# Patient Record
Sex: MALE | Race: BLACK OR AFRICAN AMERICAN | NOT HISPANIC OR LATINO | Employment: OTHER | URBAN - METROPOLITAN AREA
[De-identification: names, ages, dates, MRNs, and addresses within clinical notes are randomized per-mention and may not be internally consistent; named-entity substitution may affect disease eponyms.]

---

## 2018-03-02 ENCOUNTER — OFFICE VISIT (OUTPATIENT)
Dept: INTERNAL MEDICINE | Facility: CLINIC | Age: 63
End: 2018-03-02
Payer: MEDICARE

## 2018-03-02 VITALS
TEMPERATURE: 98 F | BODY MASS INDEX: 27.08 KG/M2 | HEIGHT: 72 IN | OXYGEN SATURATION: 99 % | HEART RATE: 106 BPM | SYSTOLIC BLOOD PRESSURE: 148 MMHG | WEIGHT: 199.94 LBS | DIASTOLIC BLOOD PRESSURE: 88 MMHG

## 2018-03-02 DIAGNOSIS — Z12.5 PROSTATE CANCER SCREENING: ICD-10-CM

## 2018-03-02 DIAGNOSIS — M25.562 CHRONIC PAIN OF LEFT KNEE: Primary | ICD-10-CM

## 2018-03-02 DIAGNOSIS — E11.8 DM (DIABETES MELLITUS) WITH COMPLICATIONS: ICD-10-CM

## 2018-03-02 DIAGNOSIS — G89.29 CHRONIC PAIN OF LEFT KNEE: Primary | ICD-10-CM

## 2018-03-02 PROCEDURE — 99204 OFFICE O/P NEW MOD 45 MIN: CPT | Mod: 25,S$PBB,, | Performed by: INTERNAL MEDICINE

## 2018-03-02 PROCEDURE — 20610 DRAIN/INJ JOINT/BURSA W/O US: CPT | Mod: PBBFAC,PO | Performed by: INTERNAL MEDICINE

## 2018-03-02 PROCEDURE — 20610 DRAIN/INJ JOINT/BURSA W/O US: CPT | Mod: S$PBB,LT,, | Performed by: INTERNAL MEDICINE

## 2018-03-02 PROCEDURE — 99203 OFFICE O/P NEW LOW 30 MIN: CPT | Mod: PBBFAC,PO,25 | Performed by: INTERNAL MEDICINE

## 2018-03-02 PROCEDURE — 99999 PR PBB SHADOW E&M-NEW PATIENT-LVL III: CPT | Mod: PBBFAC,,, | Performed by: INTERNAL MEDICINE

## 2018-03-02 RX ORDER — LANCETS 28 GAUGE
1 EACH MISCELLANEOUS DAILY
Refills: 1 | COMMUNITY
Start: 2017-12-01 | End: 2021-04-21 | Stop reason: SDUPTHER

## 2018-03-02 RX ORDER — QUETIAPINE FUMARATE 400 MG/1
400 TABLET, FILM COATED ORAL NIGHTLY
Refills: 4 | COMMUNITY
Start: 2018-01-24

## 2018-03-02 RX ORDER — PEN NEEDLE, DIABETIC 31 GX5/16"
NEEDLE, DISPOSABLE MISCELLANEOUS
Refills: 2 | COMMUNITY
Start: 2017-12-01

## 2018-03-02 RX ORDER — EZETIMIBE AND SIMVASTATIN 10; 20 MG/1; MG/1
1 TABLET ORAL NIGHTLY
Refills: 1 | COMMUNITY
Start: 2018-02-19 | End: 2018-08-24 | Stop reason: SDUPTHER

## 2018-03-02 RX ORDER — LIDOCAINE AND PRILOCAINE 25; 25 MG/G; MG/G
CREAM TOPICAL
COMMUNITY
Start: 2017-12-07

## 2018-03-02 RX ORDER — LORAZEPAM 2 MG/1
2 TABLET ORAL NIGHTLY
Refills: 0 | COMMUNITY
Start: 2018-01-19 | End: 2018-07-11

## 2018-03-02 RX ORDER — TRIAMCINOLONE ACETONIDE 40 MG/ML
40 INJECTION, SUSPENSION INTRA-ARTICULAR; INTRAMUSCULAR
Status: COMPLETED | OUTPATIENT
Start: 2018-03-02 | End: 2018-03-02

## 2018-03-02 RX ORDER — PREDNISOLONE ACETATE 10 MG/ML
SUSPENSION/ DROPS OPHTHALMIC
Refills: 1 | COMMUNITY
Start: 2018-01-09 | End: 2021-10-28

## 2018-03-02 RX ORDER — DIVALPROEX SODIUM 500 MG/1
2 TABLET, DELAYED RELEASE ORAL NIGHTLY
Refills: 4 | COMMUNITY
Start: 2017-12-31

## 2018-03-02 RX ORDER — SITAGLIPTIN AND METFORMIN HYDROCHLORIDE 1000; 50 MG/1; MG/1
TABLET, FILM COATED ORAL
Refills: 5 | COMMUNITY
Start: 2018-02-18 | End: 2018-05-24

## 2018-03-02 RX ORDER — INSULIN GLARGINE 100 [IU]/ML
88 INJECTION, SOLUTION SUBCUTANEOUS DAILY
Refills: 2 | COMMUNITY
Start: 2018-01-25 | End: 2018-04-20 | Stop reason: SDUPTHER

## 2018-03-02 RX ORDER — PEN NEEDLE, DIABETIC 31 GX5/16"
NEEDLE, DISPOSABLE MISCELLANEOUS
Refills: 1 | COMMUNITY
Start: 2018-01-25 | End: 2021-07-16 | Stop reason: SDUPTHER

## 2018-03-02 RX ORDER — BLOOD-GLUCOSE METER
2 KIT MISCELLANEOUS NIGHTLY
Refills: 1 | COMMUNITY
Start: 2017-12-01 | End: 2021-04-21 | Stop reason: SDUPTHER

## 2018-03-02 RX ADMIN — TRIAMCINOLONE ACETONIDE 40 MG: 40 INJECTION, SUSPENSION INTRA-ARTICULAR; INTRAMUSCULAR at 10:03

## 2018-03-02 NOTE — PROGRESS NOTES
HPI:  Patient is a 62-year-old man who comes in today with complaints of swelling in the left knee for last several months.  He has seen other physicians in the past.  He knows he has arthritis in that knee.  Patient denies any recent trauma or injury.    Current meds have been verified and updated per the EMR  Exam:BP (!) 148/88 (BP Location: Right arm, Patient Position: Sitting, BP Method: Medium (Manual))   Pulse 106   Temp 98.3 °F (36.8 °C) (Tympanic)   Ht 6' (1.829 m)   Wt 90.7 kg (199 lb 15.3 oz)   SpO2 99%   BMI 27.12 kg/m²   He has a large effusion in the left knee.  There is no warmth or erythema.  He has full range of motion of the knee although with some crepitus    The left knee was drained approximately 90 mL's of clear synovial fluid        Impression:  Effusion of the left knee, osteoarthritic  Patient Active Problem List   Diagnosis    DM (diabetes mellitus) with complications       Plan:  Orders Placed This Encounter    CBC auto differential    Comprehensive metabolic panel    Hemoglobin A1c    Lipid panel    PSA, Screening    TSH    Protein / creatinine ratio, urine     The left.  He was drained and then injected with 2 cc of Marcaine and 1 cc of Kenalog 40.  Patient will have the above lab work and he'll return to see me for his initial physical.

## 2018-03-05 ENCOUNTER — LAB VISIT (OUTPATIENT)
Dept: LAB | Facility: HOSPITAL | Age: 63
End: 2018-03-05
Attending: INTERNAL MEDICINE
Payer: MEDICARE

## 2018-03-05 ENCOUNTER — OFFICE VISIT (OUTPATIENT)
Dept: INTERNAL MEDICINE | Facility: CLINIC | Age: 63
End: 2018-03-05
Payer: MEDICARE

## 2018-03-05 VITALS
SYSTOLIC BLOOD PRESSURE: 170 MMHG | WEIGHT: 198.19 LBS | BODY MASS INDEX: 26.84 KG/M2 | DIASTOLIC BLOOD PRESSURE: 90 MMHG | HEIGHT: 72 IN | HEART RATE: 110 BPM | OXYGEN SATURATION: 97 % | TEMPERATURE: 96 F

## 2018-03-05 DIAGNOSIS — M25.469: Primary | ICD-10-CM

## 2018-03-05 DIAGNOSIS — Z12.5 PROSTATE CANCER SCREENING: ICD-10-CM

## 2018-03-05 DIAGNOSIS — E11.8 DM (DIABETES MELLITUS) WITH COMPLICATIONS: ICD-10-CM

## 2018-03-05 LAB
BASOPHILS # BLD AUTO: 0.04 K/UL
BASOPHILS NFR BLD: 0.3 %
DIFFERENTIAL METHOD: ABNORMAL
EOSINOPHIL # BLD AUTO: 0 K/UL
EOSINOPHIL NFR BLD: 0.1 %
ERYTHROCYTE [DISTWIDTH] IN BLOOD BY AUTOMATED COUNT: 15.6 %
HCT VFR BLD AUTO: 38.2 %
HGB BLD-MCNC: 12.7 G/DL
IMM GRANULOCYTES # BLD AUTO: 0.07 K/UL
IMM GRANULOCYTES NFR BLD AUTO: 0.6 %
LYMPHOCYTES # BLD AUTO: 3.9 K/UL
LYMPHOCYTES NFR BLD: 34.3 %
MCH RBC QN AUTO: 27.3 PG
MCHC RBC AUTO-ENTMCNC: 33.2 G/DL
MCV RBC AUTO: 82 FL
MONOCYTES # BLD AUTO: 1.2 K/UL
MONOCYTES NFR BLD: 10.8 %
NEUTROPHILS # BLD AUTO: 6.2 K/UL
NEUTROPHILS NFR BLD: 53.9 %
NRBC BLD-RTO: 0 /100 WBC
PLATELET # BLD AUTO: 290 K/UL
PMV BLD AUTO: 12 FL
RBC # BLD AUTO: 4.65 M/UL
WBC # BLD AUTO: 11.44 K/UL

## 2018-03-05 PROCEDURE — 99999 PR PBB SHADOW E&M-EST. PATIENT-LVL III: CPT | Mod: PBBFAC,,, | Performed by: INTERNAL MEDICINE

## 2018-03-05 PROCEDURE — 99213 OFFICE O/P EST LOW 20 MIN: CPT | Mod: S$PBB,,, | Performed by: INTERNAL MEDICINE

## 2018-03-05 PROCEDURE — 85025 COMPLETE CBC W/AUTO DIFF WBC: CPT

## 2018-03-05 PROCEDURE — 99213 OFFICE O/P EST LOW 20 MIN: CPT | Mod: PBBFAC,PO | Performed by: INTERNAL MEDICINE

## 2018-03-05 PROCEDURE — 84153 ASSAY OF PSA TOTAL: CPT

## 2018-03-05 PROCEDURE — 36415 COLL VENOUS BLD VENIPUNCTURE: CPT | Mod: PO

## 2018-03-05 PROCEDURE — 80053 COMPREHEN METABOLIC PANEL: CPT

## 2018-03-05 PROCEDURE — 84443 ASSAY THYROID STIM HORMONE: CPT

## 2018-03-05 PROCEDURE — 83036 HEMOGLOBIN GLYCOSYLATED A1C: CPT

## 2018-03-05 PROCEDURE — 80061 LIPID PANEL: CPT

## 2018-03-05 RX ORDER — TRAMADOL HYDROCHLORIDE 50 MG/1
50 TABLET ORAL EVERY 8 HOURS PRN
Qty: 30 TABLET | Refills: 1 | Status: SHIPPED | OUTPATIENT
Start: 2018-03-05 | End: 2018-03-15

## 2018-03-05 RX ORDER — METHYLPREDNISOLONE 4 MG/1
TABLET ORAL
Qty: 1 PACKAGE | Refills: 0 | Status: SHIPPED | OUTPATIENT
Start: 2018-03-05 | End: 2018-03-21

## 2018-03-05 NOTE — PROGRESS NOTES
HPI:  Patient is a 62-year-old man who was seen 3 days ago and had his left knee drain injected with 2 cc of Marcaine and Kenalog.  He returns today due to recurrent swelling of left knee.    Current meds have been verified and updated per the EMR  Exam:BP (!) 170/90 (BP Location: Left arm)   Pulse 110   Temp 96.3 °F (35.7 °C) (Tympanic)   Ht 6' (1.829 m)   Wt 89.9 kg (198 lb 3.1 oz)   SpO2 97%   BMI 26.88 kg/m²   The left knee has a obvious effusion.  There is no warmth or erythema to the joint.  It was just drained 3 days ago    No results found for: WBC, HGB, HCT, PLT, CHOL, TRIG, HDL, LDLDIRECT, ALT, AST, NA, K, CL, CREATININE, BUN, CO2, TSH, PSA, INR, GLUF, HGBA1C, MICROALBUR    Impression:  Recurrent effusion of the left knee  Patient Active Problem List   Diagnosis    DM (diabetes mellitus) with complications       Plan:  Orders Placed This Encounter    C-reactive protein    Sedimentation rate, manual    Uric acid    YOANDY    traMADol (ULTRAM) 50 mg tablet    methylPREDNISolone (MEDROL DOSEPACK) 4 mg tablet       He'll have the above lab work done today.  He was placed on Medrol Dosepak.  We'll see him back in a couple weeks.

## 2018-03-06 ENCOUNTER — TELEPHONE (OUTPATIENT)
Dept: INTERNAL MEDICINE | Facility: CLINIC | Age: 63
End: 2018-03-06

## 2018-03-06 LAB
ALBUMIN SERPL BCP-MCNC: 4.2 G/DL
ALP SERPL-CCNC: 113 U/L
ALT SERPL W/O P-5'-P-CCNC: 22 U/L
ANION GAP SERPL CALC-SCNC: 13 MMOL/L
AST SERPL-CCNC: 27 U/L
BILIRUB SERPL-MCNC: 0.8 MG/DL
BUN SERPL-MCNC: 40 MG/DL
CALCIUM SERPL-MCNC: 10.8 MG/DL
CHLORIDE SERPL-SCNC: 105 MMOL/L
CHOLEST SERPL-MCNC: 129 MG/DL
CHOLEST/HDLC SERPL: 2.6 {RATIO}
CO2 SERPL-SCNC: 23 MMOL/L
COMPLEXED PSA SERPL-MCNC: 5.8 NG/ML
CREAT SERPL-MCNC: 2.2 MG/DL
EST. GFR  (AFRICAN AMERICAN): 35.3 ML/MIN/1.73 M^2
EST. GFR  (NON AFRICAN AMERICAN): 30.5 ML/MIN/1.73 M^2
ESTIMATED AVG GLUCOSE: 160 MG/DL
GLUCOSE SERPL-MCNC: 106 MG/DL
HBA1C MFR BLD HPLC: 7.2 %
HDLC SERPL-MCNC: 49 MG/DL
HDLC SERPL: 38 %
LDLC SERPL CALC-MCNC: 58.6 MG/DL
NONHDLC SERPL-MCNC: 80 MG/DL
POTASSIUM SERPL-SCNC: 5.6 MMOL/L
PROT SERPL-MCNC: 8.4 G/DL
SODIUM SERPL-SCNC: 141 MMOL/L
TRIGL SERPL-MCNC: 107 MG/DL
TSH SERPL DL<=0.005 MIU/L-ACNC: 1.35 UIU/ML

## 2018-03-06 RX ORDER — FEBUXOSTAT 40 MG/1
40 TABLET, FILM COATED ORAL DAILY
Qty: 30 TABLET | Refills: 11 | Status: SHIPPED | OUTPATIENT
Start: 2018-03-06 | End: 2019-05-22 | Stop reason: SDUPTHER

## 2018-03-06 NOTE — TELEPHONE ENCOUNTER
Call patient and tell him that the additional lab work.  Suggest he has gout.  I have sent a another prescription to his pharmacy for him to start taking called Uloric.  This will lower his uric acid levels

## 2018-03-13 ENCOUNTER — OFFICE VISIT (OUTPATIENT)
Dept: INTERNAL MEDICINE | Facility: CLINIC | Age: 63
End: 2018-03-13
Payer: MEDICARE

## 2018-03-13 VITALS
TEMPERATURE: 98 F | HEIGHT: 72 IN | OXYGEN SATURATION: 98 % | DIASTOLIC BLOOD PRESSURE: 86 MMHG | WEIGHT: 185.88 LBS | RESPIRATION RATE: 16 BRPM | SYSTOLIC BLOOD PRESSURE: 120 MMHG | BODY MASS INDEX: 25.18 KG/M2 | HEART RATE: 84 BPM

## 2018-03-13 DIAGNOSIS — M25.462 EFFUSION OF LEFT KNEE: Primary | ICD-10-CM

## 2018-03-13 LAB
APPEARANCE FLD: NORMAL
BODY FLD TYPE: NORMAL
BODY FLD TYPE: NORMAL
COLOR FLD: NORMAL
CRYSTALS FLD MICRO: NEGATIVE
LYMPHOCYTES NFR FLD MANUAL: 38 %
MONOS+MACROS NFR FLD MANUAL: 52 %
NEUTROPHILS NFR FLD MANUAL: 10 %
WBC # FLD: 425 /CU MM

## 2018-03-13 PROCEDURE — 99999 PR PBB SHADOW E&M-EST. PATIENT-LVL III: CPT | Mod: PBBFAC,,, | Performed by: INTERNAL MEDICINE

## 2018-03-13 PROCEDURE — 89051 BODY FLUID CELL COUNT: CPT

## 2018-03-13 PROCEDURE — 87070 CULTURE OTHR SPECIMN AEROBIC: CPT

## 2018-03-13 PROCEDURE — 99213 OFFICE O/P EST LOW 20 MIN: CPT | Mod: PBBFAC,PO,25 | Performed by: INTERNAL MEDICINE

## 2018-03-13 PROCEDURE — 87205 SMEAR GRAM STAIN: CPT

## 2018-03-13 PROCEDURE — 20610 DRAIN/INJ JOINT/BURSA W/O US: CPT | Mod: S$PBB,LT,, | Performed by: INTERNAL MEDICINE

## 2018-03-13 PROCEDURE — 99213 OFFICE O/P EST LOW 20 MIN: CPT | Mod: 25,S$PBB,, | Performed by: INTERNAL MEDICINE

## 2018-03-13 PROCEDURE — 20610 DRAIN/INJ JOINT/BURSA W/O US: CPT | Mod: PBBFAC,PO | Performed by: INTERNAL MEDICINE

## 2018-03-13 PROCEDURE — 89060 EXAM SYNOVIAL FLUID CRYSTALS: CPT

## 2018-03-13 RX ORDER — METHYLPREDNISOLONE ACETATE 80 MG/ML
80 INJECTION, SUSPENSION INTRA-ARTICULAR; INTRALESIONAL; INTRAMUSCULAR; SOFT TISSUE
Status: COMPLETED | OUTPATIENT
Start: 2018-03-13 | End: 2018-03-13

## 2018-03-13 RX ADMIN — METHYLPREDNISOLONE ACETATE 80 MG: 80 INJECTION, SUSPENSION INTRALESIONAL; INTRAMUSCULAR; INTRASYNOVIAL; SOFT TISSUE at 03:03

## 2018-03-13 NOTE — PROGRESS NOTES
HPI:  Patient is a 62-year-old man who comes today with complaints of recurrent swelling in his left knee.  I drained approximately 2 weeks ago checked it was steroids.  Patient was found to have significant hyperuricemia.  He was placed on her lower approximately one week ago.  The effusion came back within about 5-7 days after having drained and injected with steroids.  He states it is much less, but no fluid is still there and he would like to have it drained and injected again    Current meds have been verified and updated per the EMR  Exam:/86   Pulse 84   Temp 97.6 °F (36.4 °C)   Resp 16   Ht 6' (1.829 m)   Wt 84.3 kg (185 lb 13.6 oz)   SpO2 98%   BMI 25.21 kg/m²   His left knee has a moderate effusion.  There is no warmth.  There is no erythema around the joint.  He has good range of motion about the knee.    40 mL's of thick viscous blood tinged fluid was removed from the left knee.  The left knee was injected with 2 cc of Marcaine and 1 cc of Depo-Medrol 80    Impression:    Patient Active Problem List   Diagnosis    DM (diabetes mellitus) with complications       Plan:  Orders Placed This Encounter    CULTURE, FLUID  (AEROBIC)    WBC & Diff,Body Fluid Joint Fluid, Left Knee    Body fluid crystal Joint Fluid, Left Knee    methylPREDNISolone acetate injection 80 mg     Fluid was sent for culture, cell count and crystal analysis.  We will call him with results.  For now he just stay on his current medications

## 2018-03-14 LAB — PATH INTERP FLD-IMP: NORMAL

## 2018-03-17 LAB
BACTERIA FLD AEROBE CULT: NO GROWTH
GRAM STN SPEC: NORMAL
GRAM STN SPEC: NORMAL

## 2018-03-19 ENCOUNTER — TELEPHONE (OUTPATIENT)
Dept: INTERNAL MEDICINE | Facility: CLINIC | Age: 63
End: 2018-03-19

## 2018-03-19 NOTE — TELEPHONE ENCOUNTER
----- Message from Ewa Skelton sent at 3/19/2018  2:35 PM CDT -----  Contact: pt  Calling to verify his labwork results. [phone]

## 2018-03-21 ENCOUNTER — OFFICE VISIT (OUTPATIENT)
Dept: RHEUMATOLOGY | Facility: CLINIC | Age: 63
End: 2018-03-21
Payer: MEDICARE

## 2018-03-21 ENCOUNTER — HOSPITAL ENCOUNTER (OUTPATIENT)
Dept: RADIOLOGY | Facility: HOSPITAL | Age: 63
Discharge: HOME | End: 2018-03-21
Attending: PHYSICIAN ASSISTANT
Payer: MEDICARE

## 2018-03-21 ENCOUNTER — OFFICE VISIT (OUTPATIENT)
Dept: INTERNAL MEDICINE | Facility: CLINIC | Age: 63
End: 2018-03-21
Payer: MEDICARE

## 2018-03-21 VITALS
HEIGHT: 72 IN | HEART RATE: 108 BPM | DIASTOLIC BLOOD PRESSURE: 84 MMHG | BODY MASS INDEX: 25.53 KG/M2 | WEIGHT: 188.5 LBS | OXYGEN SATURATION: 99 % | TEMPERATURE: 98 F | SYSTOLIC BLOOD PRESSURE: 144 MMHG

## 2018-03-21 VITALS — SYSTOLIC BLOOD PRESSURE: 131 MMHG | HEART RATE: 107 BPM | DIASTOLIC BLOOD PRESSURE: 83 MMHG

## 2018-03-21 DIAGNOSIS — N18.30 CKD STAGE 3 DUE TO TYPE 2 DIABETES MELLITUS: ICD-10-CM

## 2018-03-21 DIAGNOSIS — F31.9 BIPOLAR 1 DISORDER: ICD-10-CM

## 2018-03-21 DIAGNOSIS — M25.431 SWELLING OF JOINT OF RIGHT WRIST: ICD-10-CM

## 2018-03-21 DIAGNOSIS — M21.062 ACQUIRED GENU VALGUM OF LEFT KNEE: ICD-10-CM

## 2018-03-21 DIAGNOSIS — M25.462 KNEE EFFUSION, LEFT: Primary | ICD-10-CM

## 2018-03-21 DIAGNOSIS — E78.5 HYPERLIPIDEMIA ASSOCIATED WITH TYPE 2 DIABETES MELLITUS: ICD-10-CM

## 2018-03-21 DIAGNOSIS — E11.22 CKD STAGE 3 DUE TO TYPE 2 DIABETES MELLITUS: ICD-10-CM

## 2018-03-21 DIAGNOSIS — M25.462 KNEE EFFUSION, LEFT: ICD-10-CM

## 2018-03-21 DIAGNOSIS — E79.0 HYPERURICEMIA: ICD-10-CM

## 2018-03-21 DIAGNOSIS — E11.42 CONTROLLED TYPE 2 DIABETES MELLITUS WITH DIABETIC POLYNEUROPATHY, WITHOUT LONG-TERM CURRENT USE OF INSULIN: ICD-10-CM

## 2018-03-21 DIAGNOSIS — M25.562 CHRONIC PAIN OF LEFT KNEE: ICD-10-CM

## 2018-03-21 DIAGNOSIS — R97.20 ELEVATED PSA: ICD-10-CM

## 2018-03-21 DIAGNOSIS — G89.29 CHRONIC PAIN OF LEFT KNEE: ICD-10-CM

## 2018-03-21 DIAGNOSIS — N18.30 TYPE 2 DIABETES MELLITUS WITH STAGE 3 CHRONIC KIDNEY DISEASE, WITHOUT LONG-TERM CURRENT USE OF INSULIN: ICD-10-CM

## 2018-03-21 DIAGNOSIS — E11.69 HYPERLIPIDEMIA ASSOCIATED WITH TYPE 2 DIABETES MELLITUS: ICD-10-CM

## 2018-03-21 DIAGNOSIS — Z95.0 PACEMAKER: ICD-10-CM

## 2018-03-21 DIAGNOSIS — E11.22 TYPE 2 DIABETES MELLITUS WITH STAGE 3 CHRONIC KIDNEY DISEASE, WITHOUT LONG-TERM CURRENT USE OF INSULIN: ICD-10-CM

## 2018-03-21 PROCEDURE — 73130 X-RAY EXAM OF HAND: CPT | Mod: 26,50,, | Performed by: RADIOLOGY

## 2018-03-21 PROCEDURE — 99999 PR PBB SHADOW E&M-EST. PATIENT-LVL IV: CPT | Mod: PBBFAC,,, | Performed by: PHYSICIAN ASSISTANT

## 2018-03-21 PROCEDURE — 99213 OFFICE O/P EST LOW 20 MIN: CPT | Mod: PBBFAC,PO,25 | Performed by: INTERNAL MEDICINE

## 2018-03-21 PROCEDURE — 73560 X-RAY EXAM OF KNEE 1 OR 2: CPT | Mod: 26,59,RT, | Performed by: RADIOLOGY

## 2018-03-21 PROCEDURE — 99204 OFFICE O/P NEW MOD 45 MIN: CPT | Mod: S$PBB,,, | Performed by: PHYSICIAN ASSISTANT

## 2018-03-21 PROCEDURE — 99999 PR PBB SHADOW E&M-EST. PATIENT-LVL III: CPT | Mod: PBBFAC,,, | Performed by: INTERNAL MEDICINE

## 2018-03-21 PROCEDURE — 73130 X-RAY EXAM OF HAND: CPT | Mod: 50,TC,FY,PO

## 2018-03-21 PROCEDURE — 99214 OFFICE O/P EST MOD 30 MIN: CPT | Mod: PBBFAC,27,PO | Performed by: PHYSICIAN ASSISTANT

## 2018-03-21 PROCEDURE — 73560 X-RAY EXAM OF KNEE 1 OR 2: CPT | Mod: TC,FY,PO,RT

## 2018-03-21 PROCEDURE — 73562 X-RAY EXAM OF KNEE 3: CPT | Mod: 26,LT,, | Performed by: RADIOLOGY

## 2018-03-21 PROCEDURE — 99214 OFFICE O/P EST MOD 30 MIN: CPT | Mod: S$PBB,,, | Performed by: INTERNAL MEDICINE

## 2018-03-21 NOTE — PROGRESS NOTES
Subjective:       Patient ID: Hossein Will is a 62 y.o. male.    Chief Complaint: Joint Swelling (left knee) and Joint Pain (left knee)    Hossein is here today for urgent new patient evaluation for persistent left  knee pain and swelling.  This was requested by his primary care physician Dr. Chamberlain    Hossein reports in November he had some mild left knee pain that seemed to her med on its own.  December 8 for the snow day he did slip and fall  he developed mild pain and swelling in the left knee which resolved within a month.  For about 8 weeks he had no significant issues but reports over the past month his left knee has progressively gotten worse.  Initially he saw Dr. Chamberlain the knee was aspirated with fluid sent to lab identifying no crystals and fluid was noninflammatory.  His labs did show an elevated uric acid level he does have chronic kidney disease.  The knee was locally injected with steroids but the pain and swelling returned within about 10 days.  No x-rays obtained.  The knee was again aspirated and locally injected.  Hossein was placed on Uloric 40 mg daily for possible gout.  This has not helped any.  Over the past 4 weeks he noticed that the knee has become more and more deformed with a turning more inward.  He has progressed from walking to walking with a limp to walking with a cane and a walker now on a wheelchair.  Prior to this most recent issues he had had no episodes of acute pain and swelling in his joints.  He has no other peripheral joint pain.  No prolonged stiffness in his joints.  He did have a right tibial plateau fracture that was surgically repaired but no subsequent issues.  No history of rheumatoid arthritis, Crohn's or psoriatic arthritis in his family.  He denies any skin rashes or nail changes.  No back pain or stiffness that occurs at night or in the morning.  No prolonged stiffness in his spine.  Knee swelling was not preceded by any infection.  He has not had any systemic  fevers or rashes.    Pain level today 5/10 when sitting pain increases with standing but unable to put full body weight of the left knee because of the pain      Review of Systems   Constitutional: Negative.  Negative for chills, fatigue and fever.   HENT: Negative.  Negative for congestion, mouth sores and trouble swallowing.    Eyes: Negative.  Negative for photophobia, redness and visual disturbance.   Respiratory: Negative.  Negative for cough, shortness of breath and wheezing.    Cardiovascular: Negative.  Negative for chest pain and leg swelling.   Gastrointestinal: Negative.  Negative for abdominal distention, abdominal pain, diarrhea, nausea and vomiting.   Genitourinary: Negative.  Negative for dysuria, flank pain, frequency and urgency.   Musculoskeletal: Positive for arthralgias, gait problem and joint swelling. Negative for back pain, myalgias and neck pain.   Skin: Negative.  Negative for rash.   Neurological: Negative for dizziness, weakness and numbness.         Objective:   There were no vitals taken for this visit.     Physical Exam   Constitutional: He is oriented to person, place, and time and well-developed, well-nourished, and in no distress. No distress.   HENT:   Head: Normocephalic and atraumatic.   Right Ear: External ear normal.   Left Ear: External ear normal.   Mouth/Throat: No oropharyngeal exudate.   Eyes: Conjunctivae and EOM are normal. Pupils are equal, round, and reactive to light. No scleral icterus.   Neck: Neck supple. No thyromegaly present.   Cardiovascular: Normal rate, regular rhythm and normal heart sounds.    No murmur heard.  Pulmonary/Chest: Effort normal and breath sounds normal. No respiratory distress.   Abdominal: Soft. Bowel sounds are normal.       Right Side Rheumatological Exam     He has swelling of the wrist, 2nd MCP, 3rd MCP and 4th MCP    Left Side Rheumatological Exam     Knee Exam     Range of Motion   Extension: normal   Flexion: normal     Patellofemoral  Crepitus: positive  Effusion: positive  Warmth: positive      Lymphadenopathy:     He has no cervical adenopathy.   Neurological: He is alert and oriented to person, place, and time. No cranial nerve deficit. He exhibits normal muscle tone. Gait normal. Coordination normal.   Skin: Skin is warm and dry. No rash noted.     No rash  No nail change   Musculoskeletal: He exhibits edema, tenderness and deformity.   Significant valgus deformity of the left knee  Crepitus with motion  He does have a small to moderate effusion with some mild warmth but no erythema  Pain with motion of the knee  He does have some puffiness across his MCP joints of his right hand also be some mild swelling in the right wrist compared to the left but he maintains full range of motion               Component      Latest Ref Rng & Units 3/13/2018 3/5/2018   WBC      3.90 - 12.70 K/uL  11.44   RBC      4.60 - 6.20 M/uL  4.65   Hemoglobin      14.0 - 18.0 g/dL  12.7 (L)   Hematocrit      40.0 - 54.0 %  38.2 (L)   MCV      82 - 98 fL  82   MCH      27.0 - 31.0 pg  27.3   MCHC      32.0 - 36.0 g/dL  33.2   RDW      11.5 - 14.5 %  15.6 (H)   Platelets      150 - 350 K/uL  290   MPV      9.2 - 12.9 fL  12.0   Immature Granulocytes      0.0 - 0.5 %  0.6 (H)   Gran # (ANC)      1.8 - 7.7 K/uL  6.2   Immature Grans (Abs)      0.00 - 0.04 K/uL  0.07 (H)   Lymph #      1.0 - 4.8 K/uL  3.9   Mono #      0.3 - 1.0 K/uL  1.2 (H)   Eos #      0.0 - 0.5 K/uL  0.0   Baso #      0.00 - 0.20 K/uL  0.04   nRBC      0 /100 WBC  0   Gran%      38.0 - 73.0 %  53.9   Lymph%      18.0 - 48.0 %  34.3   Mono%      4.0 - 15.0 %  10.8   Eosinophil%      0.0 - 8.0 %  0.1   Basophil%      0.0 - 1.9 %  0.3   Differential Method        Automated   Sodium      136 - 145 mmol/L  141   Potassium      3.5 - 5.1 mmol/L  5.6 (H)   Chloride      95 - 110 mmol/L  105   CO2      23 - 29 mmol/L  23   Glucose      70 - 110 mg/dL  106   BUN, Bld      8 - 23 mg/dL  40 (H)   Creatinine       0.5 - 1.4 mg/dL  2.2 (H)   Calcium      8.7 - 10.5 mg/dL  10.8 (H)   Total Protein      6.0 - 8.4 g/dL  8.4   Albumin      3.5 - 5.2 g/dL  4.2   Total Bilirubin      0.1 - 1.0 mg/dL  0.8   Alkaline Phosphatase      55 - 135 U/L  113   AST      10 - 40 U/L  27   ALT      10 - 44 U/L  22   Anion Gap      8 - 16 mmol/L  13   eGFR if African American      >60 mL/min/1.73 m:2  35.3 (A)   eGFR if non African American      >60 mL/min/1.73 m:2  30.5 (A)   Cholesterol      120 - 199 mg/dL  129   Triglycerides      30 - 150 mg/dL  107   HDL      40 - 75 mg/dL  49   LDL Cholesterol      63.0 - 159.0 mg/dL  58.6 (L)   HDL/Chol Ratio      20.0 - 50.0 %  38.0   Total Cholesterol/HDL Ratio      2.0 - 5.0  2.6   Non-HDL Cholesterol      mg/dL  80   Body Fluid Type       Joint Fluid, Left Knee    Fluid Appearance       Bloody    Fluid Color       Red    WBC, Body Fluid      /cu mm 425    Segs, Fluid      % 10    Lymphs, Fluid      % 38    Monocytes/Macrophages, Fluid      % 52    Protein, Urine Random      0 - 15 mg/dL  <7   Creatinine, Random Ur      23.0 - 375.0 mg/dL  76.0   Prot/Creat Ratio, Ur      0.00 - 0.20  Unable to calculate   Hemoglobin A1C      4.0 - 5.6 %  7.2 (H)   Estimated Avg Glucose      68 - 131 mg/dL  160 (H)   Body Fluid Source, Crystal Exam       Joint Fluid, Left Knee    Body Fluid Crystal      Negative Negative    PSA, SCREEN      0.00 - 4.00 ng/mL  5.8 (H)   TSH      0.400 - 4.000 uIU/mL  1.346   CRP      0.0 - 8.2 mg/L  6.7   Sed Rate      0 - 10 mm/Hr  10   Uric Acid      3.4 - 7.0 mg/dL  9.9 (H)   YOANDY Screen      Negative <1:160  Negative <1:160   Pathologist Review, Body Fluid       REVIEWED          Assessment:       1. Knee effusion, left    2. Hyperuricemia    3. Chronic pain of left knee    4. CKD stage 3 due to type 2 diabetes mellitus          1.  62-year-old -American male with hyperuricemia and chronic kidney disease who presents with Chronic knee effusion with chronic knee pain and  progressive valgus deformity over the last 4 weeks post fall in December  Etiologies to consider  Advanced osteoarthritis with internal derangement and valgus deformity is most likely cause  Infectious process ruled out on aspiration  Gout certainly a possibility but episodes have not followed typical gout presentation, he has not been responsive to steroids and fluid analysis is negative for sodium urate crystals  Consider CPPD in the setting of CK D but again no CPPD crystals were noted on fluid analysis, possible mild swelling on the right MCP and right wrist  Rheumatoid arthritis certainly a possibility but seems less likely with the fluid being non inflammatory  Spondyloarthropathy post infection, related to psoriatic arthritis or ankylosing spondylitis also not likely does not fit the typical symptomology      2.  Mild puffiness/swelling right MCP joints and right wrist joint with full range of motion and no tenderness could be just some edema, consider possible CPPD versus RA      3.  Chronic kidney disease stage III in the setting of diabetes      Plan:       Today we'll x-ray the left knee  Unable to obtain MRI because he has a pacemaker  Refer to orthopedics for evaluation at significant valgus deformity rapidly progressing most likely will need knee replacement    We'll check a rheumatoid factor and a CCP today  X-ray both wrists looking for any calcium at the triangular fibrocartilage, also look for chondrocalcinosis in knee x-ray     Do not think this is gout we'll have him wean down on Uloric to 20 mg daily for 3 days then cut back to every other day then stop    I'll phone review labs and x-rays and follow back up only as needed unless other joint issues develop and would like to see him urgently    For now Tylenol for pain  Avoid nonsteroidal's            Copy Dr Chamberlain

## 2018-03-21 NOTE — PROGRESS NOTES
HPI:  Patient is a 62-year-old man who comes in today for his initial physical exam.  I've seen him several times already due to swelling of his left knee.  It's been drained on 2 different occasions injected with steroids.  Fluid studies have been unrevealing.  Crystal exam was negative.  His uric acid level was markedly elevated making me think that this was gout.  He was started on ULoric about 2 weeks ago, but is really not made any significant improvement.  Prior to problems with his left knee effusion He was able to ambulate normally.  Now he has difficulty and has to use some kind of assistance    He has a long history of diabetes.  He has complications of both from peripheral neuropathy and also diabetic nephropathy.  His neuropathy is mild and not enough that he would want medications for.  He does see a kidney specialist due to diabetic nephropathy.  His blood sugars.  He states in the morning are frequently less than 70.  I had no readings to go by.  His most recent hemoglobin A1c was 7.2  His blood pressure home is always been well-controlled.  He does see a urologist for elevated PSAs.  He's never had a biopsy done.    A long history of bipolar disease and is on disability due to it.  Current MEDS: medcard review, verified and update  Allergies: Per the electronic medical record    Past Medical History:   Diagnosis Date    Bipolar 1 disorder     CKD stage 3 due to type 2 diabetes mellitus     DM (diabetes mellitus) with complications     Elevated PSA     Hyperlipidemia associated with type 2 diabetes mellitus     Hyperuricemia     Knee effusion, left     Type II diabetes mellitus with renal manifestations        No past surgical history on file.    SHx: per the electronic medical record    FHx: recorded in the electronic medical record    ROS:    denies any chest pains or shortness of breath. Denies any nausea, vomiting or diarrhea. Denies any fever, chills or sweats. Denies any change in  weight, voice, stool, skin or hair. Denies any dysuria, dyspepsia or dysphagia. Denies any change in vision, hearing or headaches. Denies any swollen lymph nodes or loss of memory.    PE:  BP (!) 144/84 (BP Location: Right arm, Patient Position: Sitting, BP Method: Medium (Manual))   Pulse 108   Temp 98.2 °F (36.8 °C) (Tympanic)   Ht 6' (1.829 m)   Wt 85.5 kg (188 lb 7.9 oz)   SpO2 99%   BMI 25.56 kg/m²   Gen: Well-developed, well-nourished, male, in no acute distress, oriented x3  HEENT: neck is supple, no adenopathy, carotids 2+ equal without bruits, thyroid exam normal size without nodules.  CHEST: clear to auscultation and percussion  CVS: regular rate and rhythm 2/6 systolic murmur, no gallop or rubs  ABD: soft, benign, no rebound no guarding, no distention.  Bowel sounds are normal.     nontender.  No palpable masses.  No organomegaly and no audible bruits.  RECTAL: Deferred  EXT: no clubbing, cyanosis, or edema  LYMPH: no cervical, inguinal, or axillary adenopathy  FEET: no loss of sensation.  No ulcers or pressure sores.  Monofilament testing normal  NEURO: gait normal.  Cranial nerves II- XII intact. No nystagmus.  Speech normal.   Gross motor and sensory unremarkable.  Joint: His left knee has a mild effusion.  There is some warmth in the knee.  He has pain with weightbearing on that knee.    Lab Results   Component Value Date    WBC 11.44 03/05/2018    HGB 12.7 (L) 03/05/2018    HCT 38.2 (L) 03/05/2018     03/05/2018    CHOL 129 03/05/2018    TRIG 107 03/05/2018    HDL 49 03/05/2018    ALT 22 03/05/2018    AST 27 03/05/2018     03/05/2018    K 5.6 (H) 03/05/2018     03/05/2018    CREATININE 2.2 (H) 03/05/2018    BUN 40 (H) 03/05/2018    CO2 23 03/05/2018    TSH 1.346 03/05/2018    PSA 5.8 (H) 03/05/2018    HGBA1C 7.2 (H) 03/05/2018       Impression:  Left knee effusion, recurrent, fluid analysis unrevealing.  Sedimentation rate and CRP were normal   Diabetes, however, frequent  hypoglycemic events.    Multiple other medical problems below, stable  Patient Active Problem List   Diagnosis    DM (diabetes mellitus) with complications    Type II diabetes mellitus with renal manifestations    Hyperlipidemia associated with type 2 diabetes mellitus    Bipolar 1 disorder    CKD stage 3 due to type 2 diabetes mellitus    Hyperuricemia    Knee effusion, left    Elevated PSA    Controlled diabetes mellitus with diabetic polyneuropathy    Pacemaker       Plan:   Orders Placed This Encounter    Ambulatory consult to Rheumatology   He was referred to see a rheumatologist regarding his knee.  Patient will see me again in one month with blood sugar readings.  His other medications.  Currently, remains the same

## 2018-03-21 NOTE — LETTER
March 21, 2018      Seun Chamberlain MD  [address]  [address]  [place] [address]           [facility]  [address]  [place] [address]  Phone: [phone]  Fax: [phone]          Patient: Hossein Will   MR Number: [id number]   YOB: 1955   Date of Visit: 3/21/2018       Dear Dr. Seun Chamberlain:    Thank you for referring Hossein Will to me for evaluation. Attached you will find relevant portions of my assessment and plan of care.    If you have questions, please do not hesitate to call me. I look forward to following Hossein Will along with you.    Sincerely,    Danielle Michelle PA-C    Enclosure  CC:  No Recipients    If you would like to receive this communication electronically, please contact [email or web address] or [phone] to request more information on [employer] Link access.    For providers and/or their staff who would like to refer a patient to [facility], please contact us through our one-stop-shop provider referral line, [facility], at [phone].    If you feel you have received this communication in error or would no longer like to receive these types of communications, please e-mail [email or web address]

## 2018-03-22 ENCOUNTER — TELEPHONE (OUTPATIENT)
Dept: RHEUMATOLOGY | Facility: CLINIC | Age: 63
End: 2018-03-22

## 2018-03-22 NOTE — TELEPHONE ENCOUNTER
Called pt   rf and ccp neg  Hand x-rays neg for any  Changes consistent with cppd or RA, just some mild degenerative change    Left knee tricompartmental changes  Will see ortho Monday for evaluation

## 2018-03-26 ENCOUNTER — OFFICE VISIT (OUTPATIENT)
Dept: ORTHOPEDICS | Facility: CLINIC | Age: 63
End: 2018-03-26
Payer: MEDICARE

## 2018-03-26 VITALS
BODY MASS INDEX: 25.47 KG/M2 | HEIGHT: 72 IN | SYSTOLIC BLOOD PRESSURE: 135 MMHG | WEIGHT: 188 LBS | DIASTOLIC BLOOD PRESSURE: 82 MMHG | HEART RATE: 109 BPM

## 2018-03-26 DIAGNOSIS — M25.569 KNEE PAIN, UNSPECIFIED CHRONICITY, UNSPECIFIED LATERALITY: Primary | ICD-10-CM

## 2018-03-26 DIAGNOSIS — R93.89 ABNORMAL X-RAY: ICD-10-CM

## 2018-03-26 DIAGNOSIS — S83.411A COMPLETE TEAR OF MEDIAL COLLATERAL LIGAMENT OF RIGHT KNEE, INITIAL ENCOUNTER: ICD-10-CM

## 2018-03-26 DIAGNOSIS — R26.81 UNSTEADY GAIT: Primary | ICD-10-CM

## 2018-03-26 DIAGNOSIS — M17.0 BILATERAL PRIMARY OSTEOARTHRITIS OF KNEE: Primary | ICD-10-CM

## 2018-03-26 PROCEDURE — 99213 OFFICE O/P EST LOW 20 MIN: CPT | Mod: PBBFAC,PO | Performed by: ORTHOPAEDIC SURGERY

## 2018-03-26 PROCEDURE — 99999 PR PBB SHADOW E&M-EST. PATIENT-LVL III: CPT | Mod: PBBFAC,,, | Performed by: ORTHOPAEDIC SURGERY

## 2018-03-26 PROCEDURE — 99204 OFFICE O/P NEW MOD 45 MIN: CPT | Mod: S$PBB,,, | Performed by: ORTHOPAEDIC SURGERY

## 2018-03-26 NOTE — LETTER
March 26, 2018      Danielle Michelle PA-C  [address] [place] [address]           [facility] - Orthoped[facility]  [address] [place] [address]  Phone: [phone]  Fax: [phone]          Patient: Hossein Will   MR Number: [id number]   YOB: 1955   Date of Visit: 3/26/2018       Dear Danielle Michelle:    Thank you for referring Hossein Will to me for evaluation. Attached you will find relevant portions of my assessment and plan of care.    If you have questions, please do not hesitate to call me. I look forward to following Hossein Will along with you.    Sincerely,    Ken Macdonald MD    Enclosure  CC:  No Recipients    If you would like to receive this communication electronically, please contact [email or web address] or [phone] to request more information on [employer] Link access.    For providers and/or their staff who would like to refer a patient to [facility], please contact us through our one-stop-shop provider referral line, [facility], at [phone].    If you feel you have received this communication in error or would no longer like to receive these types of communications, please e-mail [email or web address]

## 2018-03-26 NOTE — PROGRESS NOTES
Subjective:     Patient ID: Hossein Will is a 62 y.o. male.    Chief Complaint: Pain of the Left Knee    Patient is here for left knee pain. Reports no previous injury. Reports slipping and falling on the ice in November. Hx of right tibial plateau ORIF in 1984 by Dr. Bailey. Last cortisone injection two weeks ago for left knee. Unable to walk recently.      Knee Pain    The pain is present in the left knee. This is a new problem. The current episode started more than 1 month ago. The injury was the result of a falling action while at home. The problem occurs intermittently. The problem has been gradually worsening. The quality of the pain is described as sharp. The pain is at a severity of 8/10. Pertinent negatives include no fever or numbness. The symptoms are aggravated by walking and bearing weight. He has tried injection treatment, cold, oral narcotics and NSAIDs (Cortisone injections, Tramadol, Aleve) for the symptoms. The treatment provided mild relief. Physical therapy was not tried.      Past Medical History:   Diagnosis Date    Bipolar 1 disorder     CKD stage 3 due to type 2 diabetes mellitus     Controlled diabetes mellitus with diabetic polyneuropathy     DM (diabetes mellitus) with complications     Elevated PSA     Hyperlipidemia associated with type 2 diabetes mellitus     Hypertension     Hyperuricemia     Knee effusion, left     Pacemaker     Type II diabetes mellitus with renal manifestations      Past Surgical History:   Procedure Laterality Date    HERNIA REPAIR      KNEE SURGERY       History reviewed. No pertinent family history.  Social History     Social History    Marital status:      Spouse name: N/A    Number of children: N/A    Years of education: N/A     Occupational History    Not on file.     Social History Main Topics    Smoking status: Never Smoker    Smokeless tobacco: Never Used    Alcohol use No    Drug use: No    Sexual activity: Not on file  "    Other Topics Concern    Not on file     Social History Narrative    No narrative on file     Medication List with Changes/Refills   Current Medications    BD INSULIN PEN NEEDLE UF MINI 31 GAUGE X 3/16" NDLE    USE TWICE D    BD ULTRA-FINE VINCE PEN NEEDLES 32 GAUGE X 5/32" NDLE    USE ONCE D WITH INSULIN    DIVALPROEX (DEPAKOTE) 500 MG TBEC    Take 2 tablets by mouth nightly.    EZETIMIBE-SIMVASTATIN 10-20 MG (VYTORIN) 10-20 MG PER TABLET    Take 1 tablet by mouth nightly.    FEBUXOSTAT (ULORIC) 40 MG TAB    Take 1 tablet (40 mg total) by mouth once daily.    FREESTYLE LANCETS 28 GAUGE LANCETS    1 each by Other route once daily.    FREESTYLE LITE STRIPS STRP    Take 2 tablets by mouth nightly.    JANUMET 50-1,000 MG PER TABLET    TK 1 T PO  BID    LANTUS SOLOSTAR U-100 INSULIN 100 UNIT/ML (3 ML) INPN PEN    Inject 88 Units into the skin once daily.    LIDOCAINE-PRILOCAINE (EMLA) CREAM        LORAZEPAM (ATIVAN) 2 MG TAB    Take 2 mg by mouth every evening.    PREDNISOLONE ACETATE (PRED FORTE) 1 % DRPS    INSTILL ONE DROP IN OU BID AS NEEDED    QUETIAPINE (SEROQUEL) 400 MG TABLET    Take 400 mg by mouth nightly.     Review of patient's allergies indicates:   Allergen Reactions    Abilify [aripiprazole] Other (See Comments)     Caused coma     Review of Systems   Constitution: Negative for fever and night sweats.   HENT: Negative for hearing loss.    Eyes: Negative for blurred vision and visual disturbance.   Cardiovascular: Negative for chest pain.   Respiratory: Negative for shortness of breath.    Endocrine: Negative for polyuria.   Hematologic/Lymphatic: Negative for bleeding problem.   Skin: Negative for rash.   Musculoskeletal: Positive for joint pain, joint swelling and muscle weakness. Negative for back pain and muscle cramps.   Gastrointestinal: Negative for melena.   Genitourinary: Negative for hematuria.   Neurological: Negative for loss of balance, numbness and paresthesias.   Psychiatric/Behavioral: " Negative for altered mental status.       Objective:   Body mass index is 25.5 kg/m².  Vitals:    03/26/18 1337   BP: 135/82   Pulse: 109       General: Hossein is well-developed, well-nourished, appears stated age, in no acute distress, alert and oriented to time, place and person.       General    Vitals reviewed.  Constitutional: He is oriented to person, place, and time. He appears well-developed and well-nourished. No distress.   HENT:   Mouth/Throat: No oropharyngeal exudate.   Eyes: Right eye exhibits no discharge. Left eye exhibits no discharge.   Neck: Normal range of motion.   Pulmonary/Chest: Effort normal and breath sounds normal. No respiratory distress.   Neurological: He is alert and oriented to person, place, and time. He has normal reflexes. No cranial nerve deficit. Coordination normal.   Psychiatric: He has a normal mood and affect. His behavior is normal. Judgment and thought content normal.           Right Knee Exam   Right knee exam is normal.    Inspection   Erythema: absent  Scars: absent  Swelling: absent  Effusion: effusion  Deformity: deformity  Bruising: absent    Tenderness   The patient is tender to palpation of the medial joint line, lateral joint line and condyle.    Crepitus   The patient has crepitus of the patella.    Range of Motion   Extension: 10   Flexion: 90     Tests   Meniscus   Mj:  Medial - negative Lateral - negative  Ligament Examination Lachman: normal (-1 to 2mm) PCL-Posterior Drawer: normal (0 to 2mm)     MCL - Valgus: normal (0 to 2mm)  LCL - Varus: normal  Posterior Sag Test: negative  Posterolateral Corner: unstable (>15 degrees difference)  Patella   Patellar Apprehension: negative  Passive Patellar Tilt: neutral  Patellar Glide (quadrants): Lateral - 1   Medial - 2  Patellar Grind: positive  J-Sign: none    Other   Meniscal Cyst: absent  Popliteal (Baker's) Cyst: absent  Sensation: normal    Left Knee Exam     Inspection   Erythema: present  Scars:  absent  Swelling: absent  Effusion: present  Deformity: deformity  Bruising: absent    Tenderness   The patient tender to palpation of the medial joint line and MCL.    Crepitus   The patient has crepitus of the patella.    Range of Motion   Extension: 0   Flexion: 100     Tests   Meniscus   Mj:  Medial - negative Lateral - negative  Stability Lachman: normal (-1 to 2mm) PCL-Posterior Drawer: normal (0 to 2mm)  MCL - Valgus: abnormal - grade III  LCL - Varus: normal (0 to 2mm)  Posterior Sag Test: negative  Posterolateral Corner: unstable (>15 degrees difference)  Patella   Patellar Apprehension: negative  Passive Patellar Tilt: neutral  Patellar Glide (Quadrants): Lateral - 1 Medial - 2  Patellar Grind: positive  J-Sign: J sign absent    Other   Meniscal Cyst: absent  Popliteal (Baker's) Cyst: absent  Sensation: normal    Comments:  Gastroc atrophy.    Right Hip Exam     Tests   Sabas: negative  Left Hip Exam     Tests   Sabas: negative          Muscle Strength   Right Lower Extremity   Quadriceps:  4/5   Hamstrin/5   Left Lower Extremity   Quadriceps:  3/5   Hamstrin/5     Reflexes     Left Side  Quadriceps:  2+  Achilles:  2+    Right Side   Quadriceps:  2+  Achilles:  2+    Vascular Exam     Right Pulses  Dorsalis Pedis:      2+  Posterior Tibial:      2+        Left Pulses  Dorsalis Pedis:      2+  Posterior Tibial:      2+        X-rays including standing, weight bearing AP and flexion bilateral knees, lateral and merchant views ordered and images reviewed by me show:    History of orif plateau right knee with hardware and postraumatic osteoarthritis.   Left knee tricompartmental osteoarthritis.      Assessment:     Encounter Diagnoses   Name Primary?    Bilateral primary osteoarthritis of knee Yes    Complete tear of medial collateral ligament of right knee, initial encounter         Plan:     1. Discussed with patinet his diagnosis. He has an incompetent MCL and osteoarthritis. He has severe  quad atrophy on the left side.    2. Unable to get MRI due to pacemaker. Will obtain CT to eval for intaarticular pathology    3. MCL brace    4. PT for severe quad atrophy

## 2018-04-04 ENCOUNTER — HOSPITAL ENCOUNTER (OUTPATIENT)
Dept: RADIOLOGY | Facility: HOSPITAL | Age: 63
Discharge: HOME | End: 2018-04-04
Attending: ORTHOPAEDIC SURGERY
Payer: MEDICARE

## 2018-04-04 ENCOUNTER — TELEPHONE (OUTPATIENT)
Dept: ORTHOPEDICS | Facility: CLINIC | Age: 63
End: 2018-04-04

## 2018-04-04 DIAGNOSIS — R93.89 ABNORMAL X-RAY: ICD-10-CM

## 2018-04-04 PROCEDURE — 73700 CT LOWER EXTREMITY W/O DYE: CPT | Mod: 26,LT,, | Performed by: RADIOLOGY

## 2018-04-04 PROCEDURE — 73700 CT LOWER EXTREMITY W/O DYE: CPT | Mod: TC,PO,LT

## 2018-04-09 ENCOUNTER — TELEPHONE (OUTPATIENT)
Dept: INTERNAL MEDICINE | Facility: CLINIC | Age: 63
End: 2018-04-09

## 2018-04-09 ENCOUNTER — OFFICE VISIT (OUTPATIENT)
Dept: INTERNAL MEDICINE | Facility: CLINIC | Age: 63
End: 2018-04-09
Payer: MEDICARE

## 2018-04-09 VITALS
WEIGHT: 188.25 LBS | BODY MASS INDEX: 25.5 KG/M2 | RESPIRATION RATE: 18 BRPM | HEART RATE: 88 BPM | OXYGEN SATURATION: 99 % | TEMPERATURE: 98 F | HEIGHT: 72 IN | DIASTOLIC BLOOD PRESSURE: 76 MMHG | SYSTOLIC BLOOD PRESSURE: 110 MMHG

## 2018-04-09 DIAGNOSIS — R26.2 INABILITY TO AMBULATE DUE TO MULTIPLE JOINTS: ICD-10-CM

## 2018-04-09 DIAGNOSIS — G89.29 CHRONIC PAIN OF LEFT KNEE: ICD-10-CM

## 2018-04-09 DIAGNOSIS — L97.529 ULCER OF LEFT FOOT, UNSPECIFIED ULCER STAGE: Primary | ICD-10-CM

## 2018-04-09 DIAGNOSIS — E11.42 CONTROLLED TYPE 2 DIABETES MELLITUS WITH DIABETIC POLYNEUROPATHY, WITHOUT LONG-TERM CURRENT USE OF INSULIN: ICD-10-CM

## 2018-04-09 DIAGNOSIS — M62.50 MUSCULAR ATROPHY, UNSPECIFIED SITE: ICD-10-CM

## 2018-04-09 DIAGNOSIS — M25.562 CHRONIC PAIN OF LEFT KNEE: ICD-10-CM

## 2018-04-09 PROCEDURE — 99214 OFFICE O/P EST MOD 30 MIN: CPT | Mod: S$PBB,,, | Performed by: FAMILY MEDICINE

## 2018-04-09 PROCEDURE — 99999 PR PBB SHADOW E&M-EST. PATIENT-LVL IV: CPT | Mod: PBBFAC,,, | Performed by: FAMILY MEDICINE

## 2018-04-09 PROCEDURE — 99214 OFFICE O/P EST MOD 30 MIN: CPT | Mod: PBBFAC,PO | Performed by: FAMILY MEDICINE

## 2018-04-09 RX ORDER — TAMSULOSIN HYDROCHLORIDE 0.4 MG/1
CAPSULE ORAL
Refills: 6 | COMMUNITY
Start: 2018-04-02

## 2018-04-10 ENCOUNTER — TELEPHONE (OUTPATIENT)
Dept: INTERNAL MEDICINE | Facility: CLINIC | Age: 63
End: 2018-04-10

## 2018-04-10 NOTE — PROGRESS NOTES
Subjective:       Patient ID: Hossein Will is a 62 y.o. male.    Chief Complaint: Difficulty Walking (blister on left heel )      Patient here with wife with multiple issues today. He has been having difficulties with his left knee with tear MCL, advanced osteoarthritis, patellar displacement for which he has been seeing Dr. Macdonald and recently started Pt. He also has old injury to right knee so now is unable to ambulate. He recently had fall and hit his anterior right lower leg resulting in skin tear. Has been dragging left leg and now has large bulla covering left heel.   He also in the past couple months has started to have significant muscular atrophy of right hand and left lower leg, reports areas are not painful. Has also recently had significant decreased appetite and weight loss.       Difficulty Walking   Associated symptoms include arthralgias, joint swelling and weakness. Pertinent negatives include no abdominal pain, chest pain, congestion, coughing, fever, headaches, myalgias, rash, sore throat or vomiting.     Review of Systems   Constitutional: Positive for activity change, appetite change and unexpected weight change. Negative for fever.   HENT: Negative for congestion and sore throat.    Eyes: Negative for visual disturbance.   Respiratory: Negative for cough, chest tightness and shortness of breath.    Cardiovascular: Negative for chest pain, palpitations and leg swelling.   Gastrointestinal: Negative for abdominal pain, constipation, diarrhea and vomiting.   Endocrine: Negative for polyuria.   Genitourinary: Negative for dysuria, flank pain and frequency.   Musculoskeletal: Positive for arthralgias, gait problem and joint swelling. Negative for myalgias.   Skin: Positive for wound. Negative for rash.   Neurological: Positive for weakness. Negative for dizziness, speech difficulty, light-headedness and headaches.   Psychiatric/Behavioral: Negative for hallucinations.     Past Medical History:    Diagnosis Date    Bipolar 1 disorder     CKD stage 3 due to type 2 diabetes mellitus     Controlled diabetes mellitus with diabetic polyneuropathy     DM (diabetes mellitus) with complications     Elevated PSA     Hyperlipidemia associated with type 2 diabetes mellitus     Hypertension     Hyperuricemia     Knee effusion, left     Pacemaker     Type II diabetes mellitus with renal manifestations      Past Surgical History:   Procedure Laterality Date    HERNIA REPAIR      KNEE SURGERY       History reviewed. No pertinent family history.  Social History     Social History    Marital status:      Spouse name: N/A    Number of children: N/A    Years of education: N/A     Occupational History    Not on file.     Social History Main Topics    Smoking status: Never Smoker    Smokeless tobacco: Never Used    Alcohol use No    Drug use: No    Sexual activity: Not on file     Other Topics Concern    Not on file     Social History Narrative    No narrative on file     Review of patient's allergies indicates:   Allergen Reactions    Abilify [aripiprazole] Other (See Comments)     Caused coma       Objective:       /76   Pulse 88   Temp 97.8 °F (36.6 °C)   Resp 18   Ht 6' (1.829 m)   Wt 85.4 kg (188 lb 4.4 oz)   SpO2 99%   BMI 25.53 kg/m²   Physical Exam   Constitutional: He is oriented to person, place, and time. He appears well-developed. No distress.   thin   HENT:   Head: Normocephalic.   Right Ear: Hearing, tympanic membrane, external ear and ear canal normal.   Left Ear: Hearing, tympanic membrane, external ear and ear canal normal.   Nose: Nose normal. Right sinus exhibits no maxillary sinus tenderness and no frontal sinus tenderness. Left sinus exhibits no maxillary sinus tenderness and no frontal sinus tenderness.   Mouth/Throat: Uvula is midline, oropharynx is clear and moist and mucous membranes are normal. No oropharyngeal exudate.   Eyes: Conjunctivae and EOM are  "normal. Pupils are equal, round, and reactive to light.   Neck: Normal range of motion. Neck supple.   Cardiovascular: Normal rate, regular rhythm, normal heart sounds and intact distal pulses.    Pulmonary/Chest: Effort normal and breath sounds normal. No respiratory distress.   Abdominal: Soft. Bowel sounds are normal. No hernia.   Musculoskeletal: He exhibits tenderness (both knees). He exhibits no edema.   Significant muscle atrophy left lower leg and right hand. Muscle is nontender, appears to have normal muscle strength bilaterally   Neurological: He is alert and oriented to person, place, and time. No sensory deficit. He exhibits normal muscle tone.   Skin: Skin is warm and dry. He is not diaphoretic.   Large bulla covering left heel.  Anterior right lower leg with healing superficial laceration.  No signs of infection   Psychiatric: He has a normal mood and affect. His behavior is normal. Judgment and thought content normal.   Nursing note and vitals reviewed.    Assessment:     1. Ulcer of left foot, unspecified ulcer stage    2. Chronic pain of left knee    3. Muscular atrophy, unspecified site    4. Controlled type 2 diabetes mellitus with diabetic polyneuropathy, without long-term current use of insulin    5. Inability to ambulate due to multiple joints      Plan:   Ulcer of left foot, unspecified ulcer stage  -     Ambulatory referral to Home Health    Chronic pain of left knee   Followup with Dr. Macdonald  Muscular atrophy, unspecified site  -     Ambulatory consult to Neurology    Controlled type 2 diabetes mellitus with diabetic polyneuropathy, without long-term current use of insulin    Inability to ambulate due to multiple joints  -     Ambulatory referral to Home Health  -     Ambulatory consult to Neurology    Wound care instructions given  Medication List with Changes/Refills   Current Medications    BD INSULIN PEN NEEDLE UF MINI 31 GAUGE X 3/16" NDLE    USE TWICE D    BD ULTRA-FINE VINCE PEN " "NEEDLES 32 GAUGE X 5/32" NDLE    USE ONCE D WITH INSULIN    DIVALPROEX (DEPAKOTE) 500 MG TBEC    Take 2 tablets by mouth nightly.    EZETIMIBE-SIMVASTATIN 10-20 MG (VYTORIN) 10-20 MG PER TABLET    Take 1 tablet by mouth nightly.    FEBUXOSTAT (ULORIC) 40 MG TAB    Take 1 tablet (40 mg total) by mouth once daily.    FREESTYLE LANCETS 28 GAUGE LANCETS    1 each by Other route once daily.    FREESTYLE LITE STRIPS STRP    Take 2 tablets by mouth nightly.    JANUMET 50-1,000 MG PER TABLET    TK 1 T PO  BID    LANTUS SOLOSTAR U-100 INSULIN 100 UNIT/ML (3 ML) INPN PEN    Inject 88 Units into the skin once daily.    LIDOCAINE-PRILOCAINE (EMLA) CREAM        LORAZEPAM (ATIVAN) 2 MG TAB    Take 2 mg by mouth every evening.    PREDNISOLONE ACETATE (PRED FORTE) 1 % DRPS    INSTILL ONE DROP IN OU BID AS NEEDED    QUETIAPINE (SEROQUEL) 400 MG TABLET    Take 400 mg by mouth nightly.    TAMSULOSIN (FLOMAX) 0.4 MG CP24    TK 1 C PO QD     "

## 2018-04-10 NOTE — TELEPHONE ENCOUNTER
----- Message from Carrie Mccann sent at 4/10/2018  8:25 AM CDT -----  Contact: pt wife - riley [phone]  States she missed a call and is calling to see if it were 's office that called and can be reached at [phone]//thanks/dbw

## 2018-04-12 ENCOUNTER — PATIENT OUTREACH (OUTPATIENT)
Dept: ADMINISTRATIVE | Facility: HOSPITAL | Age: 63
End: 2018-04-12

## 2018-04-16 ENCOUNTER — HOSPITAL ENCOUNTER (OUTPATIENT)
Dept: RADIOLOGY | Facility: HOSPITAL | Age: 63
Discharge: HOME | End: 2018-04-16
Attending: PSYCHIATRY & NEUROLOGY
Payer: MEDICARE

## 2018-04-16 ENCOUNTER — OFFICE VISIT (OUTPATIENT)
Dept: NEUROLOGY | Facility: CLINIC | Age: 63
End: 2018-04-16
Payer: MEDICARE

## 2018-04-16 VITALS
SYSTOLIC BLOOD PRESSURE: 116 MMHG | HEART RATE: 62 BPM | HEIGHT: 72 IN | DIASTOLIC BLOOD PRESSURE: 64 MMHG | BODY MASS INDEX: 25.08 KG/M2 | WEIGHT: 185.19 LBS

## 2018-04-16 DIAGNOSIS — M48.02 SPINAL STENOSIS OF CERVICAL REGION: ICD-10-CM

## 2018-04-16 DIAGNOSIS — M54.50 LOW BACK PAIN, NON-SPECIFIC: ICD-10-CM

## 2018-04-16 DIAGNOSIS — M47.12 CERVICAL SPONDYLOSIS WITH MYELOPATHY AND RADICULOPATHY: ICD-10-CM

## 2018-04-16 DIAGNOSIS — M47.22 CERVICAL SPONDYLOSIS WITH MYELOPATHY AND RADICULOPATHY: ICD-10-CM

## 2018-04-16 DIAGNOSIS — M47.27 LUMBOSACRAL SPONDYLOSIS WITH RADICULOPATHY: ICD-10-CM

## 2018-04-16 PROCEDURE — 99205 OFFICE O/P NEW HI 60 MIN: CPT | Mod: S$PBB,,, | Performed by: PSYCHIATRY & NEUROLOGY

## 2018-04-16 PROCEDURE — 72131 CT LUMBAR SPINE W/O DYE: CPT | Mod: TC

## 2018-04-16 PROCEDURE — 72125 CT NECK SPINE W/O DYE: CPT | Mod: TC

## 2018-04-16 PROCEDURE — 99213 OFFICE O/P EST LOW 20 MIN: CPT | Mod: PBBFAC,25 | Performed by: PSYCHIATRY & NEUROLOGY

## 2018-04-16 PROCEDURE — 99999 PR PBB SHADOW E&M-EST. PATIENT-LVL III: CPT | Mod: PBBFAC,,, | Performed by: PSYCHIATRY & NEUROLOGY

## 2018-04-16 NOTE — PROGRESS NOTES
This is a 62-year-old right-handed patient who is accompanied to the office by his wife who is able to provide a great deal of the history as patient's history is not felt to be entirely reliable.    The patient's wife indicates that he was in his usual state of health until December, 2017 when he had a fall at home, slipping on some ice.  The patient states that he fell on his left side and hip.  However after that fall, the patient's wife indicates that there has been a progressive change in the patient with increasing lower extremity weakness.  As an example, he first was utilizing a single-point cane to walk, then he began to walk utilizing a walker, and now more recently has been unable to ambulate without assistance of her and is using a wheelchair.  Transfers for example to the commode are done very carefully and with a great deal of effort.  The patient's wife states that she wheels him as far as possible to the bathroom door and then he is able to utilize his hands and arms and she is able to sustain his posture as he gets to the commode.  The patient however has had evidence of constipation, frequently requiring an enema for bowel movement.  He has remained inability to to avoid urine.  At night and during the day, he frequently will utilize a urinal.    The patient's wife states that only recently had she noticed atrophy of the intrinsic hand muscles and is unable to determine when this may have first became evident to the patient.  She has noted that he has lost some  strength in both hands but is unable to time when that may have occurred first.    When questioned, the patient denies neck pain but the patient's wife indicates that he has had chronic neck pain for some time.  He has had low back pain for number of years as well.    After noticing the atrophy of the hands, she then became aware of noticeable atrophy of the left leg compared to that on the right.  The patient had a fall in the mid  1980s that resulted in a right tibial plateau fracture the required extensive surgical repair with multiple screws and bracing.  The patient over the years has relied primarily on the left leg for standing support although she's been able to ambulate without assistive devices until recently.  The patient's wife indicates that the left leg have always been his stronger leg but now it appears both legs are weakening.  The patient spends most of his time during the day either in the wheelchair or in his bed.  Unfortunately, he recently developed an ulceration on the medial left heel as he was dragging his foot across the floor when he attempted to walk.    The patient and his wife have not noticed any change in his articulation.  They specifically deny any dysarthria.  He is not had any dysphagia.  He denies coughing with eating either solid foods or liquids.  Patient has had symptoms of diabetic polyneuropathy manifested primarily as numbness and tingling.  The patient for example is aware of numbness and tingling in the fourth and fifth fingers of both hands as well as in the toes of both feet.      ROS:  GENERAL: No fever, chills, but the patient has had a weight loss.  SKIN: No rashes, itching or changes in color or texture of skin.  HEAD: No headaches or recent head trauma.  EYES: Visual acuity fine. No photophobia, ocular pain or diplopia.  EARS: Denies ear pain, discharge or vertigo.  NOSE: No loss of smell, no epistaxis or postnasal drip.  MOUTH & THROAT: No hoarseness or change in voice. No excessive gum bleeding.  NODES: Denies swollen glands.  CHEST: Denies  cyanosis, wheezing, cough and sputum production.  CARDIOVASCULAR: Denies chest pain, PND, orthopnea   ABDOMEN: Appetite fine. No weight loss. Denies diarrhea, abdominal pain, hematemesis or blood in stool.  See comments regarding constipation.  URINARY: No flank pain, dysuria or hematuria.  PERIPHERAL VASCULAR: No claudication or  cyanosis.  MUSCULOSKELETAL: No joint stiffness or swelling.  See comments regarding atrophy of both hands and the left leg.  NEUROLOGIC: No history of seizures, or unexplained loss of consciousness.    PAST HISTORY:  Surgery: ORIF right tibial plateau fracture, inguinal hernia repair, pacemaker implantation  Medical: Bipolar 1 disorder, diabetes mellitus type 2 with chronic kidney disease stage III, and diabetic peripheral neuropathy; hypertension; hyperuricemia; coronary artery disease  ALLERGIES: Abilify    FAMILY HISTORY:  There is no known family history of neurological degenerative diseases of any type.    SOCIAL HISTORY:  The patient is  and lives with his wife who is the primary caregiver.  He is a nonsmoker.  The patient has not worked because of his psychiatric illness.    PE:   VITAL SIGNS: Blood pressure 116/64, pulse 62, weight 84 kg, height 6 foot 0 inches, BMI 25.12  APPEARANCE: Well nourished, well developed, in no acute distress.    HEAD: Normocephalic, atraumatic.  EYES: PERRL. EOMI.  Non-icteric sclerae.    EARS: TM's intact. Light reflex normal. No retraction or perforation.    NOSE: Mucosa pink. Airway clear.  MOUTH & THROAT: No tonsillar enlargement. No pharyngeal erythema or exudate. No stridor.  NECK: Supple. No bruits.  CHEST: Lungs clear to auscultation.  CARDIOVASCULAR: Regular rhythm without significant murmurs.  ABDOMEN: Bowel sounds normal. Not distended. Soft. No tenderness or masses.  MUSCULOSKELETAL:  No bony deformity seen.  There is noticeable atrophy of the first dorsal interosseous muscles bilaterally.  There is also atrophy of the abductor digiti minimi muscle bilaterally.  The entire left leg appears much smaller than that on the right.  There is a well healed incision around the right knee consistent with his history of tibial plateau fracture repair.  He has an ulceration medial aspect of the left foot which has been dressed.  NEUROLOGIC:   Mental Status:  The patient  is well oriented to person, time, place, and situation.  The patient is attentive to the environment and cooperative for the exam.  Cranial Nerves: II-XII grossly intact. Fundoscopic exam is normal.  No hemorrhage, exudate or papilledema is present. The extraocular muscles are intact in the cardinal directions of gaze.  No ptosis is present. Facial features are symmetrical.  Speech is normal in fluency, diction, and phrasing.  Tongue protrudes in the midline.  There is no tongue atrophy.  No tongue fasciculations were seen.    Gait and Station: Patient is confined to his wheelchair.  Motor:  No downdrift of either arm when held at shoulder level.  Manual muscle testing of proximal and distal muscles of both upper and lower extremities in restraints grade 4/5  strength bilaterally.  Dorsal interosseous function and abductor digiti minimi function in both hands is grade 3/5.  In the lower extremities, the patient is able to lift each leg against gravity and is able to resist downward push.  Knee extension is grade 4/5 bilaterally.  Ankle extension is graded 4/5 bilaterally.  No fasciculations were seen.  Despite the atrophy in the left leg, he has reasonable strength when compared to that on the right.  Sensory: The patient reports diminished appreciation of pinprick over the pad of the fifth and fourth fingers and medial hand over the hyperthenar eminence bilaterally.  Vibratory sensation is perceived in both ankles and both knees and in all fingers of both hands.  Cerebellar:  Finger to nose done well.  Alternating movements intact.  No involuntary movements or tremor seen.  Reflexes:  Stretch reflexes are trace both biceps and triceps and brachial radialis.  Finger flexor reflexes are 1+.  Knee jerks are trace and ankle jerks are absent bilaterally.  Plantar stimulation is flexor bilaterally    ASSESSMENT:  1.  The clinical picture here is suggestive of severe cervical spondylosis and disc disease as well as  lumbar spondylosis and disc disease although a diagnosis of amyotrophic lateral sclerosis cannot be totally eliminated.  He will need imaging of the cervical and lumbar spine.  This will be followed by EMG and nerve conduction study also.  The presence of a diabetic polyneuropathy may be also complicating the picture as there are several areas of sensory findings as well.  Generally speaking, ALS is not usually associated with sensory deficit although this can occur.  If this is related to significant myelopathy from degenerative joint disease or degenerative disc disease, referral to spine surgery would then be recommended.    RECOMMENDATIONS:  1.  Schedule CT scan of cervical spine and lumbar spine.  Depending upon results of that imaging study, EMG and nerve conduction study will be performed as well.    This was a 60 minute visit with the patient and his wife with over 50% of time spent counseling the patient and his wife regarding the plans for diagnostic workup.  This note is generated with speech recognition software and is subject to transcription error and sound alike phrases that may be missed by proofreading.    ADDENDUM  The CT scan of the cervical and lumbar spine has been reviewed and does not show changes severe enough to explain his findings on exam.  Therefore, we will need EMG and nerve conduction to be done on all four extremities.

## 2018-04-16 NOTE — LETTER
April 16, 2018      Carlota Donato MD  [address] [address] [address]  Phone: [phone]  Fax: [phone]          Patient: Hossein Will   MR Number: [id number]   YOB: 1955   Date of Visit: 4/16/2018       Dear Dr. Carlota Donato:    Thank you for referring Hossein Will to me for evaluation. Attached you will find relevant portions of my assessment and plan of care.    If you have questions, please do not hesitate to call me. I look forward to following Hossein Will along with you.    Sincerely,    Jonh Feliciano MD    Enclosure  CC:  No Recipients    If you would like to receive this communication electronically, please contact [email or web address] or [phone] to request more information on [facility] access.    For providers and/or their staff who would like to refer a patient to [facility], please contact us through our one-stop-shop provider referral line, [facility] , at [phone].    If you feel you have received this communication in error or would no longer like to receive these types of communications, please e-mail [email or web address]

## 2018-04-17 ENCOUNTER — TELEPHONE (OUTPATIENT)
Dept: NEUROLOGY | Facility: CLINIC | Age: 63
End: 2018-04-17

## 2018-04-17 ENCOUNTER — TELEPHONE (OUTPATIENT)
Dept: ORTHOPEDICS | Facility: CLINIC | Age: 63
End: 2018-04-17

## 2018-04-17 DIAGNOSIS — R29.898 LEG WEAKNESS, BILATERAL: Primary | ICD-10-CM

## 2018-04-17 NOTE — TELEPHONE ENCOUNTER
Called to give results of ct per  note. Informed that dr. nieves office will contact for appt. Sf4/17/11:30am

## 2018-04-17 NOTE — TELEPHONE ENCOUNTER
----- Message from Lela Herrera LPN sent at 4/17/2018  1:35 PM CDT -----  Contact: pt  Wife says you were going to write and order for bedside commode since he cant walk. She asked that we use her # [phone] for dme to contact. thanks  ----- Message -----  From: Divina Turner  Sent: 4/17/2018   1:29 PM  To: Jodie CASTILLO Staff    Please call pt wife @ [phone] or [phone] regarding status on order for bedside.

## 2018-04-18 ENCOUNTER — TELEPHONE (OUTPATIENT)
Dept: INTERNAL MEDICINE | Facility: CLINIC | Age: 63
End: 2018-04-18

## 2018-04-18 DIAGNOSIS — Z91.81 AT HIGH RISK FOR FALLS: Primary | ICD-10-CM

## 2018-04-18 NOTE — TELEPHONE ENCOUNTER
----- Message from Aleida Loera sent at 4/18/2018  2:46 PM CDT -----  Contact: Nannette/ [facility] Nurse   Caller request call back from nurse to discuss pt's Low Blood Sugar. [phone]

## 2018-04-18 NOTE — TELEPHONE ENCOUNTER
Nannette Marshall with [facility] is calling stating that the patient is having issues with his Blood Sugar levels and stated that the patient's blood sugar on yesterday was 26 because  of possibly overdosing himself on his diabetes medications, she stated she checked it today and it was 169, also the patient has had two falls in two days, so they wanted to know if the patient could be evaluated for inpatient rehab,and if so if orders could be put in the system,  please advise, Thanks

## 2018-04-18 NOTE — TELEPHONE ENCOUNTER
Okay to evaluate for inpatient rehabilitation.  I had no idea how to put orders in for home health

## 2018-04-20 ENCOUNTER — OFFICE VISIT (OUTPATIENT)
Dept: INTERNAL MEDICINE | Facility: CLINIC | Age: 63
End: 2018-04-20
Payer: MEDICARE

## 2018-04-20 VITALS
TEMPERATURE: 98 F | DIASTOLIC BLOOD PRESSURE: 76 MMHG | SYSTOLIC BLOOD PRESSURE: 120 MMHG | OXYGEN SATURATION: 96 % | RESPIRATION RATE: 16 BRPM | HEIGHT: 71 IN | HEART RATE: 104 BPM

## 2018-04-20 DIAGNOSIS — E79.0 HYPERURICEMIA: ICD-10-CM

## 2018-04-20 DIAGNOSIS — N18.30 TYPE 2 DIABETES MELLITUS WITH STAGE 3 CHRONIC KIDNEY DISEASE, WITHOUT LONG-TERM CURRENT USE OF INSULIN: ICD-10-CM

## 2018-04-20 DIAGNOSIS — E11.22 CKD STAGE 3 DUE TO TYPE 2 DIABETES MELLITUS: ICD-10-CM

## 2018-04-20 DIAGNOSIS — E11.22 TYPE 2 DIABETES MELLITUS WITH STAGE 3 CHRONIC KIDNEY DISEASE, WITHOUT LONG-TERM CURRENT USE OF INSULIN: ICD-10-CM

## 2018-04-20 DIAGNOSIS — E11.8 DM (DIABETES MELLITUS) WITH COMPLICATIONS: Primary | ICD-10-CM

## 2018-04-20 DIAGNOSIS — E11.69 HYPERLIPIDEMIA ASSOCIATED WITH TYPE 2 DIABETES MELLITUS: ICD-10-CM

## 2018-04-20 DIAGNOSIS — N18.30 CKD STAGE 3 DUE TO TYPE 2 DIABETES MELLITUS: ICD-10-CM

## 2018-04-20 DIAGNOSIS — E78.5 HYPERLIPIDEMIA ASSOCIATED WITH TYPE 2 DIABETES MELLITUS: ICD-10-CM

## 2018-04-20 PROCEDURE — 99214 OFFICE O/P EST MOD 30 MIN: CPT | Mod: S$PBB,,, | Performed by: INTERNAL MEDICINE

## 2018-04-20 PROCEDURE — 99999 PR PBB SHADOW E&M-EST. PATIENT-LVL III: CPT | Mod: PBBFAC,,, | Performed by: INTERNAL MEDICINE

## 2018-04-20 PROCEDURE — 99213 OFFICE O/P EST LOW 20 MIN: CPT | Mod: PBBFAC,PO | Performed by: INTERNAL MEDICINE

## 2018-04-20 RX ORDER — SULFAMETHOXAZOLE AND TRIMETHOPRIM 800; 160 MG/1; MG/1
TABLET ORAL
COMMUNITY
Start: 2018-04-18 | End: 2018-04-20

## 2018-04-20 RX ORDER — INSULIN GLARGINE 100 [IU]/ML
50 INJECTION, SOLUTION SUBCUTANEOUS NIGHTLY
Qty: 1 BOX | Refills: 11 | Status: SHIPPED | OUTPATIENT
Start: 2018-04-20 | End: 2018-09-07 | Stop reason: SDUPTHER

## 2018-04-20 NOTE — PROGRESS NOTES
"HPI:  Patient is a 62-year-old man who comes in today for follow-up of his diabetes.  His blood sugars been running low.  He's had the EMS people out 4 times in the last month with blood sugars between 20-40.  The wife states he inappropriately gives insulin.  He is not consistent with his regimen.  She states is part of his bipolar disorder.    Current meds have been verified and updated per the EMR  Exam:/76   Pulse 104   Temp 98.4 °F (36.9 °C)   Resp 16   Ht 5' 11" (1.803 m)   SpO2 96%   Exam deferred    Lab Results   Component Value Date    WBC 11.44 03/05/2018    HGB 12.7 (L) 03/05/2018    HCT 38.2 (L) 03/05/2018     03/05/2018    CHOL 129 03/05/2018    TRIG 107 03/05/2018    HDL 49 03/05/2018    ALT 22 03/05/2018    AST 27 03/05/2018     03/05/2018    K 5.6 (H) 03/05/2018     03/05/2018    CREATININE 2.2 (H) 03/05/2018    BUN 40 (H) 03/05/2018    CO2 23 03/05/2018    TSH 1.346 03/05/2018    PSA 5.8 (H) 03/05/2018    HGBA1C 7.2 (H) 03/05/2018       Impression:  Diabetes, hemoglobin A1c was 7.2.  Patient is having too many hypoglycemic events.  Due to his underlying conditions, I would be quite content with his hemoglobin A1c being anything less than 8.  I think we need to significantly decrease his insulin   Gait instability, seen by neurology, CT of the cervical and lumbar spine showed some degenerative disc disease, but not significant to be causing his problems.  He'll be having the EMG done in the next couple weeks   Patient Active Problem List   Diagnosis    DM (diabetes mellitus) with complications    Type II diabetes mellitus with renal manifestations    Hyperlipidemia associated with type 2 diabetes mellitus    Bipolar 1 disorder    CKD stage 3 due to type 2 diabetes mellitus    Hyperuricemia    Knee effusion, left    Elevated PSA    Controlled diabetes mellitus with diabetic polyneuropathy    Pacemaker    Chronic pain of left knee    Swelling of joint of right " wrist    Bilateral primary osteoarthritis of knee    Complete tear of medial collateral ligament of right knee    Cervical spondylosis with myelopathy and radiculopathy    Lumbosacral spondylosis with radiculopathy       Plan:  Orders Placed This Encounter    CBC auto differential    Comprehensive metabolic panel    Hemoglobin A1c    Lipid panel    LANTUS SOLOSTAR U-100 INSULIN 100 unit/mL (3 mL) InPn pen     He will decrease his insulin from 88 down to taking 50 units a day.  I want to see him back in 2 months with the above lab work.

## 2018-04-23 ENCOUNTER — NURSE TRIAGE (OUTPATIENT)
Dept: ADMINISTRATIVE | Facility: CLINIC | Age: 63
End: 2018-04-23

## 2018-04-23 NOTE — TELEPHONE ENCOUNTER
Reason for Disposition   Difficult to awaken or acting confused (e.g., disoriented, slurred speech)    Protocols used: ST HENRIQUE Brunson with [facility] calling to report temp of 102 and difficulty to awaken.  Per protocol advised to call 911.  Verbalized understanding and will do so.

## 2018-04-25 ENCOUNTER — TELEPHONE (OUTPATIENT)
Dept: ADMINISTRATIVE | Facility: CLINIC | Age: 63
End: 2018-04-25

## 2018-04-25 NOTE — PATIENT INSTRUCTIONS
Home Health SOC with [facility]. Dr Carlota Donato. SN, PT, OT, HHA services

## 2018-05-18 ENCOUNTER — TELEPHONE (OUTPATIENT)
Dept: PHYSICAL MEDICINE AND REHAB | Facility: CLINIC | Age: 63
End: 2018-05-18

## 2018-05-18 NOTE — TELEPHONE ENCOUNTER
----- Message from Sweta Carreonite sent at 5/18/2018 11:31 AM CDT -----  Contact: Monica Nicolette (Spouse)  Monica called and stated she needs to schedule the EMG as soon as possible. She can be reached at [phone].    Thanks,  TF

## 2018-05-21 ENCOUNTER — TELEPHONE (OUTPATIENT)
Dept: PHYSICAL MEDICINE AND REHAB | Facility: CLINIC | Age: 63
End: 2018-05-21

## 2018-05-21 NOTE — TELEPHONE ENCOUNTER
----- Message from Sweta Delaney sent at 5/21/2018  4:39 PM CDT -----  Contact: Monica (spouse)  Patient was in the hospital for 5/9 and will be released tomorrow. Patients needs an EMG scheduled as soon as possible.  Please contact @ [phone].

## 2018-05-23 ENCOUNTER — TELEPHONE (OUTPATIENT)
Dept: INTERNAL MEDICINE | Facility: CLINIC | Age: 63
End: 2018-05-23

## 2018-05-23 NOTE — TELEPHONE ENCOUNTER
Patient has been in hospital for last thirty. When discharged by Dr. Mari has questions concerning medications. Appointment was scheduled for follow up patient ask to bring in all paper work and medications to appointment.

## 2018-05-23 NOTE — TELEPHONE ENCOUNTER
----- Message from Divina Turner sent at 5/23/2018  9:47 AM CDT -----  Contact: wife/Esteban  Please call pt @ [phone], states pt was discharge from [facility], pt was given a script for Januvia 50mg, but insurance will not pay unless PCP doctor prescribe, wife need to speak with nurse regarding other medication for diabetic.

## 2018-05-24 ENCOUNTER — LAB VISIT (OUTPATIENT)
Dept: LAB | Facility: HOSPITAL | Age: 63
End: 2018-05-24
Attending: INTERNAL MEDICINE
Payer: MEDICARE

## 2018-05-24 ENCOUNTER — OFFICE VISIT (OUTPATIENT)
Dept: INTERNAL MEDICINE | Facility: CLINIC | Age: 63
End: 2018-05-24
Payer: MEDICARE

## 2018-05-24 VITALS
HEIGHT: 71 IN | BODY MASS INDEX: 25.06 KG/M2 | OXYGEN SATURATION: 98 % | WEIGHT: 179 LBS | HEART RATE: 99 BPM | DIASTOLIC BLOOD PRESSURE: 68 MMHG | SYSTOLIC BLOOD PRESSURE: 102 MMHG | TEMPERATURE: 97 F

## 2018-05-24 DIAGNOSIS — E11.69 HYPERLIPIDEMIA ASSOCIATED WITH TYPE 2 DIABETES MELLITUS: ICD-10-CM

## 2018-05-24 DIAGNOSIS — E11.22 TYPE 2 DIABETES MELLITUS WITH STAGE 3 CHRONIC KIDNEY DISEASE, WITHOUT LONG-TERM CURRENT USE OF INSULIN: ICD-10-CM

## 2018-05-24 DIAGNOSIS — E79.0 HYPERURICEMIA: ICD-10-CM

## 2018-05-24 DIAGNOSIS — E11.22 CKD STAGE 3 DUE TO TYPE 2 DIABETES MELLITUS: ICD-10-CM

## 2018-05-24 DIAGNOSIS — N18.30 TYPE 2 DIABETES MELLITUS WITH STAGE 3 CHRONIC KIDNEY DISEASE, WITHOUT LONG-TERM CURRENT USE OF INSULIN: ICD-10-CM

## 2018-05-24 DIAGNOSIS — E11.8 DM (DIABETES MELLITUS) WITH COMPLICATIONS: ICD-10-CM

## 2018-05-24 DIAGNOSIS — E11.42 CONTROLLED TYPE 2 DIABETES MELLITUS WITH DIABETIC POLYNEUROPATHY, WITHOUT LONG-TERM CURRENT USE OF INSULIN: Primary | ICD-10-CM

## 2018-05-24 DIAGNOSIS — E78.5 HYPERLIPIDEMIA ASSOCIATED WITH TYPE 2 DIABETES MELLITUS: ICD-10-CM

## 2018-05-24 DIAGNOSIS — N18.30 CKD STAGE 3 DUE TO TYPE 2 DIABETES MELLITUS: ICD-10-CM

## 2018-05-24 DIAGNOSIS — M25.462 KNEE EFFUSION, LEFT: ICD-10-CM

## 2018-05-24 DIAGNOSIS — S83.411A COMPLETE TEAR OF MEDIAL COLLATERAL LIGAMENT OF RIGHT KNEE, INITIAL ENCOUNTER: ICD-10-CM

## 2018-05-24 LAB
ALBUMIN SERPL BCP-MCNC: 3.7 G/DL
ALP SERPL-CCNC: 79 U/L
ALT SERPL W/O P-5'-P-CCNC: 8 U/L
ANION GAP SERPL CALC-SCNC: 8 MMOL/L
AST SERPL-CCNC: 18 U/L
BASOPHILS # BLD AUTO: 0.04 K/UL
BASOPHILS NFR BLD: 0.5 %
BILIRUB SERPL-MCNC: 0.5 MG/DL
BUN SERPL-MCNC: 41 MG/DL
CALCIUM SERPL-MCNC: 10.3 MG/DL
CHLORIDE SERPL-SCNC: 109 MMOL/L
CO2 SERPL-SCNC: 23 MMOL/L
CREAT SERPL-MCNC: 2 MG/DL
DIFFERENTIAL METHOD: ABNORMAL
EOSINOPHIL # BLD AUTO: 0.3 K/UL
EOSINOPHIL NFR BLD: 3.9 %
ERYTHROCYTE [DISTWIDTH] IN BLOOD BY AUTOMATED COUNT: 17.2 %
EST. GFR  (AFRICAN AMERICAN): 39.6 ML/MIN/1.73 M^2
EST. GFR  (NON AFRICAN AMERICAN): 34.3 ML/MIN/1.73 M^2
ESTIMATED AVG GLUCOSE: 114 MG/DL
GLUCOSE SERPL-MCNC: 112 MG/DL
HBA1C MFR BLD HPLC: 5.6 %
HCT VFR BLD AUTO: 30.5 %
HGB BLD-MCNC: 9.9 G/DL
IMM GRANULOCYTES # BLD AUTO: 0.07 K/UL
IMM GRANULOCYTES NFR BLD AUTO: 0.9 %
LYMPHOCYTES # BLD AUTO: 3 K/UL
LYMPHOCYTES NFR BLD: 40.1 %
MCH RBC QN AUTO: 27.3 PG
MCHC RBC AUTO-ENTMCNC: 32.5 G/DL
MCV RBC AUTO: 84 FL
MONOCYTES # BLD AUTO: 0.9 K/UL
MONOCYTES NFR BLD: 11.9 %
NEUTROPHILS # BLD AUTO: 3.2 K/UL
NEUTROPHILS NFR BLD: 42.7 %
NRBC BLD-RTO: 0 /100 WBC
PLATELET # BLD AUTO: 167 K/UL
PMV BLD AUTO: 12.4 FL
POTASSIUM SERPL-SCNC: 5.2 MMOL/L
PROT SERPL-MCNC: 7.6 G/DL
RBC # BLD AUTO: 3.63 M/UL
SODIUM SERPL-SCNC: 140 MMOL/L
WBC # BLD AUTO: 7.49 K/UL

## 2018-05-24 PROCEDURE — 99214 OFFICE O/P EST MOD 30 MIN: CPT | Mod: PBBFAC,PO | Performed by: INTERNAL MEDICINE

## 2018-05-24 PROCEDURE — 80053 COMPREHEN METABOLIC PANEL: CPT

## 2018-05-24 PROCEDURE — 36415 COLL VENOUS BLD VENIPUNCTURE: CPT | Mod: PO

## 2018-05-24 PROCEDURE — 83036 HEMOGLOBIN GLYCOSYLATED A1C: CPT

## 2018-05-24 PROCEDURE — 99214 OFFICE O/P EST MOD 30 MIN: CPT | Mod: S$PBB,,, | Performed by: INTERNAL MEDICINE

## 2018-05-24 PROCEDURE — 85025 COMPLETE CBC W/AUTO DIFF WBC: CPT

## 2018-05-24 PROCEDURE — 99999 PR PBB SHADOW E&M-EST. PATIENT-LVL IV: CPT | Mod: PBBFAC,,, | Performed by: INTERNAL MEDICINE

## 2018-05-24 RX ORDER — METFORMIN HYDROCHLORIDE 1000 MG/1
TABLET ORAL
Refills: 0 | COMMUNITY
Start: 2018-05-22 | End: 2018-05-24

## 2018-05-24 NOTE — PROGRESS NOTES
"HPI:  The patient is a 62-year-old man comes today for hospital follow-up.  Patient was admitted and was in hospital for 2 weeks related to urinary tract infection.  He then was transferred to  skilled nursing unit for rehab.  He has been another 2 weeks there.  He has been home now just a few days.  I reviewed the hospital records.  Patient was treated for bed ulcers related to his being mobile.  He was receiving wound care.  He has continued to do that at home.    Current meds have been verified and updated per the EMR  Exam:/68 (BP Location: Left arm)   Pulse 99   Temp 96.7 °F (35.9 °C) (Tympanic)   Ht 5' 11" (1.803 m)   Wt 81.2 kg (179 lb 0.2 oz)   SpO2 98%   BMI 24.97 kg/m²    exam deferred    Lab Results   Component Value Date    WBC 11.44 03/05/2018    HGB 12.7 (L) 03/05/2018    HCT 38.2 (L) 03/05/2018     03/05/2018    CHOL 129 03/05/2018    TRIG 107 03/05/2018    HDL 49 03/05/2018    ALT 22 03/05/2018    AST 27 03/05/2018     03/05/2018    K 5.6 (H) 03/05/2018     03/05/2018    CREATININE 2.2 (H) 03/05/2018    BUN 40 (H) 03/05/2018    CO2 23 03/05/2018    TSH 1.346 03/05/2018    PSA 5.8 (H) 03/05/2018    HGBA1C 7.2 (H) 03/05/2018       Impression:  Continue problems with undefined neuromuscular disorder  Diabetes with chronic kidney disease. Not sure why but he was discharged from the rehab hospital on metformin.  With his creatinine being 2.2 he should not be on metformin  Hypertension, stable  Left knee effusion, this is never been addressed.  He did have a CT scan of the knee that showed he has tear of the MCL.  He never went back to see the orthopedist  Patient Active Problem List   Diagnosis    DM (diabetes mellitus) with complications    Type II diabetes mellitus with renal manifestations    Hyperlipidemia associated with type 2 diabetes mellitus    Bipolar 1 disorder    CKD stage 3 due to type 2 diabetes mellitus    Hyperuricemia    Knee effusion, left    " Elevated PSA    Controlled diabetes mellitus with diabetic polyneuropathy    Pacemaker    Chronic pain of left knee    Complete tear of medial collateral ligament of right knee    Cervical spondylosis with myelopathy and radiculopathy    Lumbosacral spondylosis with radiculopathy       Plan:     Patient will discontinue metformin.  He will stay on just insulin for now.  He will see me back in 11 days with all of his medications.  Patient will have lab work done today we will call him with results.  I want him to be seen by Orthopedics next week.

## 2018-05-28 ENCOUNTER — TELEPHONE (OUTPATIENT)
Dept: ORTHOPEDICS | Facility: CLINIC | Age: 63
End: 2018-05-28

## 2018-05-28 NOTE — TELEPHONE ENCOUNTER
Spoke with this patient this morning to reschedule his appointment from 5/28/18  to 5/30/18 due to Dr Macdonald being called in for emergency surgery. Patient verbalized understanding.. SJ

## 2018-05-29 ENCOUNTER — TELEPHONE (OUTPATIENT)
Dept: INTERNAL MEDICINE | Facility: CLINIC | Age: 63
End: 2018-05-29

## 2018-05-29 NOTE — TELEPHONE ENCOUNTER
Patient requesting a prescription for Rory 28.8 gr packets. Dispense 30 packets says it is for wound healing given while in the hospital. Drink two packets daily mix with 8 oz of juice or water.

## 2018-05-29 NOTE — TELEPHONE ENCOUNTER
----- Message from Poonam Gill sent at 5/29/2018  4:47 PM CDT -----  Contact: pt  Please give pt a call at [phone] he was returning the nurse call.

## 2018-05-29 NOTE — TELEPHONE ENCOUNTER
----- Message from Kamille Velazquez sent at 5/29/2018  3:36 PM CDT -----  Pt at [phone]//states is needing to speak with your office//pt did not make clear what he was needing//please call to discuss//thanks/kobe

## 2018-05-30 ENCOUNTER — TELEPHONE (OUTPATIENT)
Dept: INTERNAL MEDICINE | Facility: CLINIC | Age: 63
End: 2018-05-30

## 2018-05-30 ENCOUNTER — OFFICE VISIT (OUTPATIENT)
Dept: ORTHOPEDICS | Facility: CLINIC | Age: 63
End: 2018-05-30
Payer: MEDICARE

## 2018-05-30 VITALS
HEART RATE: 105 BPM | SYSTOLIC BLOOD PRESSURE: 128 MMHG | WEIGHT: 179 LBS | BODY MASS INDEX: 25.06 KG/M2 | HEIGHT: 71 IN | DIASTOLIC BLOOD PRESSURE: 70 MMHG

## 2018-05-30 DIAGNOSIS — M17.0 PRIMARY OSTEOARTHRITIS OF BOTH KNEES: Primary | ICD-10-CM

## 2018-05-30 DIAGNOSIS — S83.411D SPRAIN OF MEDIAL COLLATERAL LIGAMENT OF RIGHT KNEE, SUBSEQUENT ENCOUNTER: ICD-10-CM

## 2018-05-30 PROCEDURE — 99999 PR PBB SHADOW E&M-EST. PATIENT-LVL III: CPT | Mod: PBBFAC,,, | Performed by: ORTHOPAEDIC SURGERY

## 2018-05-30 PROCEDURE — 99214 OFFICE O/P EST MOD 30 MIN: CPT | Mod: S$PBB,,, | Performed by: ORTHOPAEDIC SURGERY

## 2018-05-30 PROCEDURE — 99213 OFFICE O/P EST LOW 20 MIN: CPT | Mod: PBBFAC,PO | Performed by: ORTHOPAEDIC SURGERY

## 2018-05-30 NOTE — PROGRESS NOTES
Subjective:     Patient ID: Hossein Will is a 62 y.o. male.    Chief Complaint: Pain and Follow-up of the Left Knee    Follow up for left knee pain. Was sent for PT but was unable to do due to being in hospital last 30 days for UTI. Getting some home PT now.    Patient is here for left knee pain. Reports no previous injury. Reports slipping and falling on the ice in November. Hx of right tibial plateau ORIF in 1984 by Dr. Bailey. Last cortisone injection two weeks ago for left knee. Unable to walk recently.      Knee Pain    The pain is present in the left knee. This is a new problem. The current episode started more than 1 month ago. The injury was the result of a falling action while at home. The problem occurs intermittently. The problem has been gradually worsening. The quality of the pain is described as sharp. The pain is at a severity of 6/10. Pertinent negatives include no fever or numbness. The symptoms are aggravated by walking and bearing weight. He has tried injection treatment, cold, oral narcotics and NSAIDs (Cortisone injections, Tramadol, Aleve) for the symptoms. The treatment provided mild relief. Physical therapy was not tried.      Past Medical History:   Diagnosis Date    Bipolar 1 disorder     CKD stage 3 due to type 2 diabetes mellitus     Controlled diabetes mellitus with diabetic polyneuropathy     DM (diabetes mellitus) with complications     Elevated PSA     Hyperlipidemia associated with type 2 diabetes mellitus     Hypertension     Hyperuricemia     Knee effusion, left     Pacemaker     Type II diabetes mellitus with renal manifestations      Past Surgical History:   Procedure Laterality Date    HERNIA REPAIR      KNEE SURGERY      TRANSURETHRAL RESECTION OF PROSTATE       History reviewed. No pertinent family history.  Social History     Social History    Marital status:      Spouse name: N/A    Number of children: N/A    Years of education: N/A  "    Occupational History    Not on file.     Social History Main Topics    Smoking status: Never Smoker    Smokeless tobacco: Never Used    Alcohol use No    Drug use: No    Sexual activity: No     Other Topics Concern    Not on file     Social History Narrative    No narrative on file     Medication List with Changes/Refills   Current Medications    BD INSULIN PEN NEEDLE UF MINI 31 GAUGE X 3/16" NDLE    USE TWICE D    BD ULTRA-FINE VINCE PEN NEEDLES 32 GAUGE X 5/32" NDLE    USE ONCE D WITH INSULIN    DIVALPROEX (DEPAKOTE) 500 MG TBEC    Take 2 tablets by mouth nightly.    EZETIMIBE-SIMVASTATIN 10-20 MG (VYTORIN) 10-20 MG PER TABLET    Take 1 tablet by mouth nightly.    FEBUXOSTAT (ULORIC) 40 MG TAB    Take 1 tablet (40 mg total) by mouth once daily.    FREESTYLE LANCETS 28 GAUGE LANCETS    1 each by Other route once daily.    FREESTYLE LITE STRIPS STRP    Take 2 tablets by mouth nightly.    LANTUS SOLOSTAR U-100 INSULIN 100 UNIT/ML (3 ML) INPN PEN    Inject 50 Units into the skin every evening.    LIDOCAINE-PRILOCAINE (EMLA) CREAM        LORAZEPAM (ATIVAN) 2 MG TAB    Take 2 mg by mouth every evening.    PREDNISOLONE ACETATE (PRED FORTE) 1 % DRPS    INSTILL ONE DROP IN OU BID AS NEEDED    QUETIAPINE (SEROQUEL) 400 MG TABLET    Take 400 mg by mouth nightly.    TAMSULOSIN (FLOMAX) 0.4 MG CP24    TK 1 C PO QD     Review of patient's allergies indicates:   Allergen Reactions    Abilify [aripiprazole] Other (See Comments)     Caused coma     Review of Systems   Constitution: Negative for fever and night sweats.   HENT: Negative for hearing loss.    Eyes: Negative for blurred vision and visual disturbance.   Cardiovascular: Negative for chest pain.   Respiratory: Negative for shortness of breath.    Endocrine: Negative for polyuria.   Hematologic/Lymphatic: Negative for bleeding problem.   Skin: Negative for rash.   Musculoskeletal: Positive for joint pain, joint swelling and muscle weakness. Negative for back " pain and muscle cramps.   Gastrointestinal: Negative for melena.   Genitourinary: Negative for hematuria.   Neurological: Negative for loss of balance, numbness and paresthesias.   Psychiatric/Behavioral: Negative for altered mental status.       Objective:   Body mass index is 24.97 kg/m².  Vitals:    05/30/18 1132   BP: 128/70   Pulse: 105       General: Hossein is well-developed, well-nourished, appears stated age, in no acute distress, alert and oriented to time, place and person.       General    Vitals reviewed.  Constitutional: He is oriented to person, place, and time. He appears well-developed and well-nourished. No distress.   HENT:   Mouth/Throat: No oropharyngeal exudate.   Eyes: Right eye exhibits no discharge. Left eye exhibits no discharge.   Neck: Normal range of motion.   Pulmonary/Chest: Effort normal. No respiratory distress.   Neurological: He is alert and oriented to person, place, and time. He has normal reflexes. No cranial nerve deficit. Coordination normal.   Psychiatric: He has a normal mood and affect. His behavior is normal. Judgment and thought content normal.           Right Knee Exam     Inspection   Erythema: absent  Scars: absent  Swelling: absent  Effusion: effusion  Deformity: deformity  Bruising: absent    Tenderness   The patient is tender to palpation of the medial joint line, lateral joint line and condyle.    Crepitus   The patient has crepitus of the patella.    Range of Motion   Extension: 10   Flexion: 90     Tests   Ligament Examination Lachman: normal (-1 to 2mm) PCL-Posterior Drawer: normal (0 to 2mm)     Posterior Sag Test: negative  Patella   Patellar Apprehension: negative  Passive Patellar Tilt: neutral  Patellar Glide (quadrants): Lateral - 1   Medial - 2  Patellar Grind: positive    Other   Meniscal Cyst: absent  Popliteal (Baker's) Cyst: absent  Sensation: normal    Left Knee Exam     Inspection   Erythema: absent  Scars: absent  Swelling: absent  Effusion:  absent  Deformity: deformity  Bruising: absent    Tenderness   The patient tender to palpation of the medial joint line.    Crepitus   The patient has crepitus of the patella.    Range of Motion   Extension: 0   Flexion: 100     Tests   Stability Lachman: normal (-1 to 2mm) PCL-Posterior Drawer: normal (0 to 2mm)  Posterior Sag Test: negative  Patella   Patellar Apprehension: negative  Passive Patellar Tilt: neutral  Patellar Glide (Quadrants): Lateral - 1 Medial - 2  Patellar Grind: positive    Other   Meniscal Cyst: absent  Popliteal (Baker's) Cyst: absent  Sensation: normal    Comments:  Gastroc atrophy.    Right Hip Exam     Tests   Sabas: negative  Left Hip Exam     Tests   Sabas: negative          Muscle Strength   Right Lower Extremity   Quadriceps:  4/5   Hamstrin/5   Left Lower Extremity   Quadriceps:  3/5   Hamstrin/5     Vascular Exam     Right Pulses  Dorsalis Pedis:      2+  Posterior Tibial:      2+        Left Pulses  Dorsalis Pedis:      2+  Posterior Tibial:      2+        X-rays including standing, weight bearing AP and flexion bilateral knees, lateral and merchant views ordered and images reviewed by me show:    History of orif plateau right knee with hardware and postraumatic osteoarthritis.   Left knee tricompartmental osteoarthritis.      Assessment:     Encounter Diagnoses   Name Primary?    Primary osteoarthritis of both knees Yes    Sprain of medial collateral ligament of right knee, subsequent encounter         Plan:     1.Continue hinged knee brace    2. PT for strengthening    3. F/up 6mo    4. Has appointment with neurology who is following for severe muscle atrophy

## 2018-05-30 NOTE — TELEPHONE ENCOUNTER
Patient notified per Dr. Chamberlain that no prescription for Rory is required because it is over the counter therefore not covered by insurance. Patient verbalized understanding.

## 2018-06-01 ENCOUNTER — PATIENT OUTREACH (OUTPATIENT)
Dept: ADMINISTRATIVE | Facility: HOSPITAL | Age: 63
End: 2018-06-01

## 2018-06-05 ENCOUNTER — TELEPHONE (OUTPATIENT)
Dept: INTERNAL MEDICINE | Facility: CLINIC | Age: 63
End: 2018-06-05

## 2018-06-05 NOTE — TELEPHONE ENCOUNTER
----- Message from Carlota Donato MD sent at 6/5/2018 10:19 AM CDT -----  josefina [facility] sent me a certification on him from 4/18-now.  Can you please call them and let them know it should be under Dr. Chamberlain, who is his PCP.

## 2018-06-05 NOTE — TELEPHONE ENCOUNTER
Martha called back from [facility]. She stated that the certification was sent to you because you were the original provider to sent  orders . Under medicare law the provider to start  it has to sign the face t face and anything after that will go to Dr. Chamberlain . So she resent it to you .

## 2018-06-05 NOTE — TELEPHONE ENCOUNTER
----- Message from Jazlyn Escalona sent at 6/5/2018  2:05 PM CDT -----  Contact: [facility] // Natalia  Calling concerning POC of patient. Please call Natalia @

## 2018-06-06 ENCOUNTER — LAB VISIT (OUTPATIENT)
Dept: LAB | Facility: HOSPITAL | Age: 63
End: 2018-06-06
Attending: INTERNAL MEDICINE
Payer: MEDICARE

## 2018-06-06 ENCOUNTER — OFFICE VISIT (OUTPATIENT)
Dept: INTERNAL MEDICINE | Facility: CLINIC | Age: 63
End: 2018-06-06
Payer: MEDICARE

## 2018-06-06 VITALS
BODY MASS INDEX: 26.05 KG/M2 | HEART RATE: 107 BPM | TEMPERATURE: 97 F | DIASTOLIC BLOOD PRESSURE: 84 MMHG | SYSTOLIC BLOOD PRESSURE: 152 MMHG | HEIGHT: 71 IN | WEIGHT: 186.06 LBS | OXYGEN SATURATION: 98 %

## 2018-06-06 DIAGNOSIS — Z86.010 HISTORY OF COLON POLYPS: ICD-10-CM

## 2018-06-06 DIAGNOSIS — N18.30 CKD STAGE 3 DUE TO TYPE 2 DIABETES MELLITUS: ICD-10-CM

## 2018-06-06 DIAGNOSIS — E78.5 HYPERLIPIDEMIA ASSOCIATED WITH TYPE 2 DIABETES MELLITUS: ICD-10-CM

## 2018-06-06 DIAGNOSIS — E11.22 TYPE 2 DIABETES MELLITUS WITH STAGE 3 CHRONIC KIDNEY DISEASE, WITHOUT LONG-TERM CURRENT USE OF INSULIN: ICD-10-CM

## 2018-06-06 DIAGNOSIS — E11.22 CKD STAGE 3 DUE TO TYPE 2 DIABETES MELLITUS: ICD-10-CM

## 2018-06-06 DIAGNOSIS — N18.30 CHRONIC KIDNEY DISEASE, STAGE III (MODERATE): Primary | ICD-10-CM

## 2018-06-06 DIAGNOSIS — E79.0 HYPERURICEMIA: Primary | ICD-10-CM

## 2018-06-06 DIAGNOSIS — E11.69 HYPERLIPIDEMIA ASSOCIATED WITH TYPE 2 DIABETES MELLITUS: ICD-10-CM

## 2018-06-06 DIAGNOSIS — E11.42 CONTROLLED TYPE 2 DIABETES MELLITUS WITH DIABETIC POLYNEUROPATHY, WITHOUT LONG-TERM CURRENT USE OF INSULIN: ICD-10-CM

## 2018-06-06 DIAGNOSIS — E11.8 DM (DIABETES MELLITUS) WITH COMPLICATIONS: ICD-10-CM

## 2018-06-06 DIAGNOSIS — N18.30 TYPE 2 DIABETES MELLITUS WITH STAGE 3 CHRONIC KIDNEY DISEASE, WITHOUT LONG-TERM CURRENT USE OF INSULIN: ICD-10-CM

## 2018-06-06 LAB
ALBUMIN SERPL BCP-MCNC: 3.8 G/DL
ANION GAP SERPL CALC-SCNC: 9 MMOL/L
BASOPHILS # BLD AUTO: 0.05 K/UL
BASOPHILS NFR BLD: 0.6 %
BUN SERPL-MCNC: 31 MG/DL
CALCIUM SERPL-MCNC: 10.5 MG/DL
CHLORIDE SERPL-SCNC: 106 MMOL/L
CO2 SERPL-SCNC: 25 MMOL/L
CREAT SERPL-MCNC: 2.1 MG/DL
DIFFERENTIAL METHOD: ABNORMAL
EOSINOPHIL # BLD AUTO: 0.1 K/UL
EOSINOPHIL NFR BLD: 1 %
ERYTHROCYTE [DISTWIDTH] IN BLOOD BY AUTOMATED COUNT: 16.9 %
EST. GFR  (AFRICAN AMERICAN): 37.3 ML/MIN/1.73 M^2
EST. GFR  (NON AFRICAN AMERICAN): 32.3 ML/MIN/1.73 M^2
GLUCOSE SERPL-MCNC: 186 MG/DL
HCT VFR BLD AUTO: 30.8 %
HGB BLD-MCNC: 10 G/DL
IMM GRANULOCYTES # BLD AUTO: 0.06 K/UL
IMM GRANULOCYTES NFR BLD AUTO: 0.7 %
LYMPHOCYTES # BLD AUTO: 2.4 K/UL
LYMPHOCYTES NFR BLD: 27.7 %
MCH RBC QN AUTO: 27.5 PG
MCHC RBC AUTO-ENTMCNC: 32.5 G/DL
MCV RBC AUTO: 85 FL
MONOCYTES # BLD AUTO: 1.1 K/UL
MONOCYTES NFR BLD: 11.9 %
NEUTROPHILS # BLD AUTO: 5.1 K/UL
NEUTROPHILS NFR BLD: 58.1 %
NRBC BLD-RTO: 0 /100 WBC
PHOSPHATE SERPL-MCNC: 3.6 MG/DL
PLATELET # BLD AUTO: 395 K/UL
PLATELET # BLD AUTO: 395 K/UL
PMV BLD AUTO: 11.3 FL
PMV BLD AUTO: 11.3 FL
POTASSIUM SERPL-SCNC: 5.2 MMOL/L
RBC # BLD AUTO: 3.64 M/UL
SODIUM SERPL-SCNC: 140 MMOL/L
WBC # BLD AUTO: 8.8 K/UL

## 2018-06-06 PROCEDURE — 99999 PR PBB SHADOW E&M-EST. PATIENT-LVL III: CPT | Mod: PBBFAC,,, | Performed by: INTERNAL MEDICINE

## 2018-06-06 PROCEDURE — 85025 COMPLETE CBC W/AUTO DIFF WBC: CPT

## 2018-06-06 PROCEDURE — 80069 RENAL FUNCTION PANEL: CPT

## 2018-06-06 PROCEDURE — 36415 COLL VENOUS BLD VENIPUNCTURE: CPT | Mod: PO

## 2018-06-06 PROCEDURE — 99213 OFFICE O/P EST LOW 20 MIN: CPT | Mod: S$PBB,,, | Performed by: INTERNAL MEDICINE

## 2018-06-06 PROCEDURE — 99213 OFFICE O/P EST LOW 20 MIN: CPT | Mod: PBBFAC,PO | Performed by: INTERNAL MEDICINE

## 2018-06-06 RX ORDER — TRAMADOL HYDROCHLORIDE 50 MG/1
TABLET ORAL
COMMUNITY
End: 2019-03-01 | Stop reason: SDUPTHER

## 2018-06-06 RX ORDER — ASPIRIN 81 MG/1
81 TABLET ORAL DAILY
COMMUNITY

## 2018-06-06 NOTE — PROGRESS NOTES
"HPI:  Patient is a 62-year-old man who comes today for follow-up of his diabetes.  Patient admits he has not 100% compliant with his insulin.  He takes is about 5/7 nights.  He denies any hypoglycemic events.  He will be seen his kidney specialist this coming week.  He has a long history of chronic kidney disease.  He did see the orthopedist regarding his left knee effusion.  He is in therapy now for it.  It is doing better.  No surgical intervention was felt necessary    Current meds have been verified and updated per the EMR  Exam:BP (!) 152/84 (BP Location: Left arm)   Pulse 107   Temp 97.1 °F (36.2 °C) (Tympanic)   Ht 5' 11" (1.803 m)   Wt 84.4 kg (186 lb 1.1 oz)   SpO2 98%   BMI 25.95 kg/m²   Exam deferred    Lab Results   Component Value Date    WBC 7.49 05/24/2018    HGB 9.9 (L) 05/24/2018    HCT 30.5 (L) 05/24/2018     05/24/2018    CHOL 129 03/05/2018    TRIG 107 03/05/2018    HDL 49 03/05/2018    ALT 8 (L) 05/24/2018    AST 18 05/24/2018     05/24/2018    K 5.2 (H) 05/24/2018     05/24/2018    CREATININE 2.0 (H) 05/24/2018    BUN 41 (H) 05/24/2018    CO2 23 05/24/2018    TSH 1.346 03/05/2018    PSA 5.8 (H) 03/05/2018    HGBA1C 5.6 05/24/2018       Impression:  Diabetes, very well controlled  Chronic kidney disease, stable  Anemia, suspect due to his chronic kidney disease  Bipolar disorder  Chronic left knee effusion  Hyperuricemia  Patient Active Problem List   Diagnosis    DM (diabetes mellitus) with complications    Type II diabetes mellitus with renal manifestations    Hyperlipidemia associated with type 2 diabetes mellitus    Bipolar 1 disorder    CKD stage 3 due to type 2 diabetes mellitus    Hyperuricemia    Knee effusion, left    Elevated PSA    Controlled diabetes mellitus with diabetic polyneuropathy    Pacemaker    Chronic pain of left knee    Complete tear of medial collateral ligament of right knee    Cervical spondylosis with myelopathy and radiculopathy "    Lumbosacral spondylosis with radiculopathy    History of colon polyps       Plan:  Orders Placed This Encounter    Hemoglobin A1c    Lipid panel    Comprehensive metabolic panel    Uric acid     Patient will see me in 3 months with above lab work.  Patient will provide records from his nephrologist.  He will see me otherwise as needed medications remain the same

## 2018-06-08 ENCOUNTER — TELEPHONE (OUTPATIENT)
Dept: INTERNAL MEDICINE | Facility: CLINIC | Age: 63
End: 2018-06-08

## 2018-06-08 NOTE — TELEPHONE ENCOUNTER
He should stop the Janumet which is a combination of Januvia and metformin and take only Januvia.  I have sent in a new prescription for Januvia.

## 2018-06-08 NOTE — TELEPHONE ENCOUNTER
Dr. Chamberlain the patient stated that he forgot to tell you that he was taking Janumet and wants to know if he should continue this, please advise, Thanks

## 2018-06-08 NOTE — TELEPHONE ENCOUNTER
----- Message from Poonam Gill sent at 6/8/2018 12:22 PM CDT -----  Contact: Pt/Wife  Please give pt wife a call at ..[phone] (home) [phone] (work)regarding if the pt needs to take this medication JANUMET 50/1000 mg

## 2018-06-15 ENCOUNTER — TELEPHONE (OUTPATIENT)
Dept: INTERNAL MEDICINE | Facility: CLINIC | Age: 63
End: 2018-06-15
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
## 2018-06-25 ENCOUNTER — TELEPHONE (OUTPATIENT)
Dept: ADMINISTRATIVE | Facility: CLINIC | Age: 63
End: 2018-06-25

## 2018-06-25 NOTE — PATIENT INSTRUCTIONS
Home Health Recert 06/09/2018 to 08/07/2018 [facility] Dr Seun Chamberlain, SN, PT, OT services.

## 2018-07-06 ENCOUNTER — TELEPHONE (OUTPATIENT)
Dept: INTERNAL MEDICINE | Facility: CLINIC | Age: 63
End: 2018-07-06

## 2018-07-06 DIAGNOSIS — Z48.89 ENCOUNTER FOR POSTOPERATIVE WOUND CARE: Primary | ICD-10-CM

## 2018-07-09 NOTE — TELEPHONE ENCOUNTER
----- Message from Kamille Velazquez sent at 7/6/2018 11:04 AM CDT -----  Pt at [phone]//states he was admitted to the hospital so his home health was stopped//now that he is out of the hospital he is needing them to come again//also he is needing to come in this afternoon for a nurse visit so a new bandage can be put on his leg//please call asap//dionna/kobe  
Dr. Chamberlain, the patient is trying to get home health services, he was hospitalized and needs home health, can orders be put in, Thanks  
Please put the order in pend it to me  
Please repeat put the order in and fill out all the necessary hard stops.  I have no idea why he seen home health, which home health company he is using or any other of the necessary required information for the order.  
This is the order for HH form Mr Will   
No

## 2018-07-11 ENCOUNTER — OFFICE VISIT (OUTPATIENT)
Dept: INTERNAL MEDICINE | Facility: CLINIC | Age: 63
End: 2018-07-11
Payer: MEDICARE

## 2018-07-11 VITALS
BODY MASS INDEX: 23.65 KG/M2 | SYSTOLIC BLOOD PRESSURE: 118 MMHG | WEIGHT: 174.63 LBS | HEART RATE: 94 BPM | HEIGHT: 72 IN | DIASTOLIC BLOOD PRESSURE: 78 MMHG | RESPIRATION RATE: 16 BRPM | TEMPERATURE: 98 F | OXYGEN SATURATION: 98 %

## 2018-07-11 DIAGNOSIS — F31.9 BIPOLAR 1 DISORDER: ICD-10-CM

## 2018-07-11 DIAGNOSIS — E11.8 DM (DIABETES MELLITUS) WITH COMPLICATIONS: Primary | ICD-10-CM

## 2018-07-11 PROCEDURE — 99213 OFFICE O/P EST LOW 20 MIN: CPT | Mod: S$PBB,,, | Performed by: INTERNAL MEDICINE

## 2018-07-11 PROCEDURE — 99999 PR PBB SHADOW E&M-EST. PATIENT-LVL III: CPT | Mod: PBBFAC,,, | Performed by: INTERNAL MEDICINE

## 2018-07-11 PROCEDURE — 99213 OFFICE O/P EST LOW 20 MIN: CPT | Mod: PBBFAC,PO | Performed by: INTERNAL MEDICINE

## 2018-07-11 RX ORDER — CLONAZEPAM 1 MG/1
TABLET ORAL
Refills: 2 | COMMUNITY
Start: 2018-07-03 | End: 2021-07-16

## 2018-07-11 NOTE — PROGRESS NOTES
HPI:  Patient is a 62-year-old man who comes today for hospital follow-up.  Patient was admitted to the psychiatric facility at [facility] due to a manic flare of his bipolar disorder.  He has been home now for several weeks.  There has been no further problems. He is back to his baseline levels.  He saw his outpatient psychiatrist last week.  His medications have been adjusted.  There been no other issues to address.  He would like to get wound care nurse to changed his dressings on his feet.  He sees a wound cared physician and nurse on a regular basis.    Current meds have been verified and updated per the EMR  Exam:/78   Pulse 94   Temp 98 °F (36.7 °C)   Resp 16   Ht 6' (1.829 m)   Wt 79.2 kg (174 lb 9.7 oz)   SpO2 98%   BMI 23.68 kg/m²   He has some venous ulcers on both lower extremities.  There is no signs of infection.    Lab Results   Component Value Date    WBC 8.80 06/06/2018    HGB 10.0 (L) 06/06/2018    HCT 30.8 (L) 06/06/2018     (H) 06/06/2018     (H) 06/06/2018    CHOL 129 03/05/2018    TRIG 107 03/05/2018    HDL 49 03/05/2018    ALT 8 (L) 05/24/2018    AST 18 05/24/2018     06/06/2018    K 5.2 (H) 06/06/2018     06/06/2018    CREATININE 2.1 (H) 06/06/2018    BUN 31 (H) 06/06/2018    CO2 25 06/06/2018    TSH 1.346 03/05/2018    PSA 5.8 (H) 03/05/2018    HGBA1C 5.6 05/24/2018       Impression:  Bipolar disorder  Venous stasis ulcers both lower extremities  Multiple other medical problems below  Patient Active Problem List   Diagnosis    DM (diabetes mellitus) with complications    Type II diabetes mellitus with renal manifestations    Hyperlipidemia associated with type 2 diabetes mellitus    Bipolar 1 disorder    CKD stage 3 due to type 2 diabetes mellitus    Hyperuricemia    Knee effusion, left    Elevated PSA    Controlled diabetes mellitus with diabetic polyneuropathy    Pacemaker    Chronic pain of left knee    Complete tear of medial  collateral ligament of right knee    Cervical spondylosis with myelopathy and radiculopathy    Lumbosacral spondylosis with radiculopathy    History of colon polyps       Plan:     He needs to address the decision about home wound care nursing with his wound care team.  He sees them on a weekly basis.  They are the ones that should be directing the care.  He will be seen me in September for his diabetes follow-up

## 2018-07-13 ENCOUNTER — TELEPHONE (OUTPATIENT)
Dept: INTERNAL MEDICINE | Facility: CLINIC | Age: 63
End: 2018-07-13

## 2018-07-13 DIAGNOSIS — R26.81 GAIT INSTABILITY: ICD-10-CM

## 2018-07-13 DIAGNOSIS — M62.81 MUSCLE WEAKNESS: Primary | ICD-10-CM

## 2018-07-13 NOTE — TELEPHONE ENCOUNTER
Patient is requesting services for [facility] for PT/OT. Still having problems with his knee and hand. Please advise.

## 2018-07-13 NOTE — TELEPHONE ENCOUNTER
----- Message from Costa Cintron sent at 7/13/2018  4:04 PM CDT -----  Contact: pt   Pt would like to put back on Home Health Care.   Physical and Aux patient  therapy. Dr. lang will handle wound care therapy .    Pt needs order to restart home health care          [phone] (work)

## 2018-07-13 NOTE — TELEPHONE ENCOUNTER
Please put in the referral for home health and pend it to me.  he will be getting the physical therapy through the home health

## 2018-07-30 ENCOUNTER — TELEPHONE (OUTPATIENT)
Dept: INTERNAL MEDICINE | Facility: CLINIC | Age: 63
End: 2018-07-30

## 2018-07-30 NOTE — TELEPHONE ENCOUNTER
----- Message from Felisha Puri sent at 7/30/2018 11:28 AM CDT -----  Contact: Patient  Patient needs to talk t onurse about some meds he is on, please call him back at [phone]. Thank you

## 2018-08-09 ENCOUNTER — TELEPHONE (OUTPATIENT)
Dept: ADMINISTRATIVE | Facility: CLINIC | Age: 63
End: 2018-08-09

## 2018-08-09 NOTE — PROGRESS NOTES
Home Health SOC 07/17/2018 - 09/14/2018 with [facility] ([facility]) - Dr. Seun Chamberlain. Patient received PT services.

## 2018-08-17 ENCOUNTER — OFFICE VISIT (OUTPATIENT)
Dept: ORTHOPEDICS | Facility: CLINIC | Age: 63
End: 2018-08-17
Payer: MEDICARE

## 2018-08-17 ENCOUNTER — HOSPITAL ENCOUNTER (OUTPATIENT)
Dept: RADIOLOGY | Facility: HOSPITAL | Age: 63
Discharge: HOME | End: 2018-08-17
Attending: ORTHOPAEDIC SURGERY
Payer: MEDICARE

## 2018-08-17 VITALS
WEIGHT: 179 LBS | SYSTOLIC BLOOD PRESSURE: 109 MMHG | BODY MASS INDEX: 24.24 KG/M2 | DIASTOLIC BLOOD PRESSURE: 62 MMHG | HEART RATE: 98 BPM | HEIGHT: 72 IN

## 2018-08-17 DIAGNOSIS — M17.0 PRIMARY OSTEOARTHRITIS OF BOTH KNEES: Primary | ICD-10-CM

## 2018-08-17 DIAGNOSIS — M25.562 LEFT KNEE PAIN, UNSPECIFIED CHRONICITY: Primary | ICD-10-CM

## 2018-08-17 DIAGNOSIS — L97.529 ULCER OF LEFT FOOT, UNSPECIFIED ULCER STAGE: ICD-10-CM

## 2018-08-17 DIAGNOSIS — M25.562 LEFT KNEE PAIN, UNSPECIFIED CHRONICITY: ICD-10-CM

## 2018-08-17 PROCEDURE — 73564 X-RAY EXAM KNEE 4 OR MORE: CPT | Mod: 26,LT,, | Performed by: RADIOLOGY

## 2018-08-17 PROCEDURE — 99214 OFFICE O/P EST MOD 30 MIN: CPT | Mod: S$PBB,,, | Performed by: ORTHOPAEDIC SURGERY

## 2018-08-17 PROCEDURE — 99213 OFFICE O/P EST LOW 20 MIN: CPT | Mod: PBBFAC,25,PO | Performed by: ORTHOPAEDIC SURGERY

## 2018-08-17 PROCEDURE — 73562 X-RAY EXAM OF KNEE 3: CPT | Mod: TC,FY,PO,RT

## 2018-08-17 PROCEDURE — 99999 PR PBB SHADOW E&M-EST. PATIENT-LVL III: CPT | Mod: PBBFAC,,, | Performed by: ORTHOPAEDIC SURGERY

## 2018-08-17 PROCEDURE — 73564 X-RAY EXAM KNEE 4 OR MORE: CPT | Mod: TC,FY,PO,LT

## 2018-08-17 PROCEDURE — 73562 X-RAY EXAM OF KNEE 3: CPT | Mod: 26,59,RT, | Performed by: RADIOLOGY

## 2018-08-17 RX ORDER — INSULIN LISPRO 100 [IU]/ML
INJECTION, SOLUTION INTRAVENOUS; SUBCUTANEOUS
COMMUNITY
Start: 2018-05-07 | End: 2018-09-07

## 2018-08-17 RX ORDER — EZETIMIBE AND SIMVASTATIN 10; 20 MG/1; MG/1
1 TABLET ORAL
COMMUNITY
End: 2018-09-07

## 2018-08-17 RX ORDER — INSULIN GLARGINE 100 [IU]/ML
25 INJECTION, SOLUTION SUBCUTANEOUS
COMMUNITY
Start: 2018-06-21 | End: 2018-09-07

## 2018-08-17 NOTE — PROGRESS NOTES
Subjective:     Patient ID: Hossein Will is a 62 y.o. male.    Chief Complaint: No chief complaint on file.    Follow up for left knee pain. Was sent for PT but was unable to do due to being in hospital last 30 days for UTI. Getting some home PT now.    Patient is here for left knee pain. Reports no previous injury. Reports slipping and falling on the ice in November. Hx of right tibial plateau ORIF in 1984 by Dr. Bailey. Last cortisone injection two weeks ago for left knee. Unable to walk recently.      Knee Pain    The pain is present in the left knee. The pain radiates to the left foot. This is a new problem. The current episode started more than 1 month ago. The injury was the result of a falling action while at home. The problem occurs constantly. The problem has been gradually worsening. The quality of the pain is described as sharp. The pain is at a severity of 5/10. Pertinent negatives include no fever or numbness. The symptoms are aggravated by walking and bearing weight. He has tried injection treatment, cold, oral narcotics and NSAIDs (Cortisone injections, Tramadol, Aleve) for the symptoms. The treatment provided mild relief. Physical therapy was not tried and effective.      Past Medical History:   Diagnosis Date    Bipolar 1 disorder     CKD stage 3 due to type 2 diabetes mellitus     Controlled diabetes mellitus with diabetic polyneuropathy     DM (diabetes mellitus) with complications     Elevated PSA     History of colon polyps     Hyperlipidemia associated with type 2 diabetes mellitus     Hypertension     Hyperuricemia     Knee effusion, left     Pacemaker     Type II diabetes mellitus with renal manifestations      Past Surgical History:   Procedure Laterality Date    HERNIA REPAIR      KNEE SURGERY      TRANSURETHRAL RESECTION OF PROSTATE       History reviewed. No pertinent family history.  Social History     Socioeconomic History    Marital status:      Spouse name:  "Not on file    Number of children: Not on file    Years of education: Not on file    Highest education level: Not on file   Social Needs    Financial resource strain: Not on file    Food insecurity - worry: Not on file    Food insecurity - inability: Not on file    Transportation needs - medical: Not on file    Transportation needs - non-medical: Not on file   Occupational History    Not on file   Tobacco Use    Smoking status: Never Smoker    Smokeless tobacco: Never Used   Substance and Sexual Activity    Alcohol use: No    Drug use: No    Sexual activity: No   Other Topics Concern    Not on file   Social History Narrative    Not on file     Medication List with Changes/Refills   Current Medications    ASPIRIN (ECOTRIN) 81 MG EC TABLET    Take 81 mg by mouth once daily.    BD INSULIN PEN NEEDLE UF MINI 31 GAUGE X 3/16" NDLE    USE TWICE D    BD ULTRA-FINE VINCE PEN NEEDLES 32 GAUGE X 5/32" NDLE    USE ONCE D WITH INSULIN    CLONAZEPAM (KLONOPIN) 1 MG TABLET    TK 1 T PO  Q HS    DIVALPROEX (DEPAKOTE) 500 MG TBEC    Take 2 tablets by mouth nightly.    EZETIMIBE-SIMVASTATIN 10-20 MG (VYTORIN) 10-20 MG PER TABLET    Take 1 tablet by mouth nightly.    EZETIMIBE-SIMVASTATIN 10-20 MG (VYTORIN) 10-20 MG PER TABLET    Take 1 tablet by mouth.    FEBUXOSTAT (ULORIC) 40 MG TAB    Take 1 tablet (40 mg total) by mouth once daily.    FREESTYLE LANCETS 28 GAUGE LANCETS    1 each by Other route once daily.    FREESTYLE LITE STRIPS STRP    Take 2 tablets by mouth nightly.    INSULIN GLARGINE (LANTUS SOLOSTAR) 100 UNIT/ML (3 ML) INPN PEN    Inject 25 Units into the skin.    INSULIN LISPRO (HUMALOG) 100 UNIT/ML INJECTION    Inject into the skin.    LANTUS SOLOSTAR U-100 INSULIN 100 UNIT/ML (3 ML) INPN PEN    Inject 50 Units into the skin every evening.    LIDOCAINE-PRILOCAINE (EMLA) CREAM        PREDNISOLONE ACETATE (PRED FORTE) 1 % DRPS    INSTILL ONE DROP IN OU BID AS NEEDED    QUETIAPINE (SEROQUEL) 400 MG TABLET    " Take 400 mg by mouth nightly.    SITAGLIPTIN (JANUVIA) 100 MG TAB    Take 1 tablet (100 mg total) by mouth once daily.    TAMSULOSIN (FLOMAX) 0.4 MG CP24    TK 1 C PO QD    TRAMADOL (ULTRAM) 50 MG TABLET    tramadol 50 mg tablet     Review of patient's allergies indicates:   Allergen Reactions    Abilify [aripiprazole] Other (See Comments)     Caused coma     Review of Systems   Constitution: Negative for fever and night sweats.   HENT: Negative for hearing loss.    Eyes: Negative for blurred vision and visual disturbance.   Cardiovascular: Negative for chest pain.   Respiratory: Negative for shortness of breath.    Endocrine: Negative for polyuria.   Hematologic/Lymphatic: Negative for bleeding problem.   Skin: Negative for rash.   Musculoskeletal: Positive for joint pain, joint swelling and muscle weakness. Negative for back pain and muscle cramps.   Gastrointestinal: Negative for melena.   Genitourinary: Negative for hematuria.   Neurological: Negative for loss of balance, numbness and paresthesias.   Psychiatric/Behavioral: Negative for altered mental status.       Objective:   Body mass index is 24.28 kg/m².  Vitals:    08/17/18 1107   BP: 109/62   Pulse: 98       General: Hossein is well-developed, well-nourished, appears stated age, in no acute distress, alert and oriented to time, place and person.       General    Vitals reviewed.  Constitutional: He is oriented to person, place, and time. He appears well-developed and well-nourished. No distress.   HENT:   Mouth/Throat: No oropharyngeal exudate.   Eyes: Right eye exhibits no discharge. Left eye exhibits no discharge.   Neck: Normal range of motion.   Pulmonary/Chest: Effort normal. No respiratory distress.   Neurological: He is alert and oriented to person, place, and time. He has normal reflexes. No cranial nerve deficit. Coordination normal.   Psychiatric: He has a normal mood and affect. His behavior is normal. Judgment and thought content normal.            Right Knee Exam     Inspection   Erythema: absent  Scars: absent  Swelling: absent  Effusion: effusion  Deformity: deformity  Bruising: absent    Tenderness   The patient is tender to palpation of the medial joint line, lateral joint line and condyle.    Crepitus   The patient has crepitus of the patella.    Range of Motion   Extension: 10   Flexion: 90     Tests   Ligament Examination Lachman: normal (-1 to 2mm) PCL-Posterior Drawer: normal (0 to 2mm)     Posterior Sag Test: negative  Patella   Patellar apprehension: negative  Passive Patellar Tilt: neutral  Patellar Glide (quadrants): Lateral - 1   Medial - 2  Patellar Grind: positive    Other   Meniscal Cyst: absent  Popliteal (Baker's) Cyst: absent  Sensation: normal    Left Knee Exam     Inspection   Erythema: absent  Scars: absent  Swelling: absent  Effusion: present  Deformity: deformity  Bruising: absent    Tenderness   The patient tender to palpation of the medial joint line.    Crepitus   The patient has crepitus of the patella.    Range of Motion   Extension: 0   Flexion: 100     Tests   Stability Lachman: normal (-1 to 2mm) PCL-Posterior Drawer: normal (0 to 2mm)  Posterior Sag Test: negative  Patella   Patellar apprehension: negative  Passive Patellar Tilt: neutral  Patellar Glide (Quadrants): Lateral - 1 Medial - 2  Patellar Grind: positive    Other   Meniscal Cyst: absent  Popliteal (Baker's) Cyst: absent  Sensation: normal    Comments:  Gastroc atrophy.    Right Hip Exam     Tests   Sabas: negative  Left Hip Exam     Tests   Sabas: negative          Muscle Strength   Right Lower Extremity   Quadriceps:  4/5   Hamstrin/5   Left Lower Extremity   Quadriceps:  3/5   Hamstrin/5     Vascular Exam     Right Pulses  Dorsalis Pedis:      2+  Posterior Tibial:      2+        Left Pulses  Dorsalis Pedis:      2+  Posterior Tibial:      2+        X-rays including standing, weight bearing AP and flexion bilateral knees, lateral and merchant  views ordered and images reviewed by me show:    History of orif plateau right knee with hardware and postraumatic osteoarthritis.   Left knee tricompartmental osteoarthritis.      Assessment:     Encounter Diagnoses   Name Primary?    Primary osteoarthritis of both knees Yes    Ulcer of left foot, unspecified ulcer stage         Plan:     1.Continue hinged knee brace    2. PT for strengthening    3. F/up 6mo    4. Has appointment with neurology who is following for severe muscle atrophy     5. Declined injection today    6. Podiatry for foot ulcer    7. Dr. Bedolla for genicular nerves

## 2018-08-21 ENCOUNTER — HOSPITAL ENCOUNTER (OUTPATIENT)
Dept: RADIOLOGY | Facility: HOSPITAL | Age: 63
Discharge: HOME | End: 2018-08-21
Attending: PODIATRIST
Payer: MEDICARE

## 2018-08-21 ENCOUNTER — OFFICE VISIT (OUTPATIENT)
Dept: PODIATRY | Facility: CLINIC | Age: 63
End: 2018-08-21
Payer: MEDICARE

## 2018-08-21 VITALS
DIASTOLIC BLOOD PRESSURE: 73 MMHG | HEART RATE: 88 BPM | HEIGHT: 72 IN | SYSTOLIC BLOOD PRESSURE: 129 MMHG | WEIGHT: 179 LBS | BODY MASS INDEX: 24.24 KG/M2

## 2018-08-21 DIAGNOSIS — L97.322 ANKLE ULCER, LEFT, WITH FAT LAYER EXPOSED: ICD-10-CM

## 2018-08-21 DIAGNOSIS — L97.529 TYPE 2 DIABETES MELLITUS WITH LEFT DIABETIC FOOT ULCER: ICD-10-CM

## 2018-08-21 DIAGNOSIS — E11.621 TYPE 2 DIABETES MELLITUS WITH LEFT DIABETIC FOOT ULCER: Primary | ICD-10-CM

## 2018-08-21 DIAGNOSIS — E11.621 TYPE 2 DIABETES MELLITUS WITH LEFT DIABETIC FOOT ULCER: ICD-10-CM

## 2018-08-21 DIAGNOSIS — N18.30 CKD STAGE 3 DUE TO TYPE 2 DIABETES MELLITUS: ICD-10-CM

## 2018-08-21 DIAGNOSIS — L97.529 TYPE 2 DIABETES MELLITUS WITH LEFT DIABETIC FOOT ULCER: Primary | ICD-10-CM

## 2018-08-21 DIAGNOSIS — E11.22 CKD STAGE 3 DUE TO TYPE 2 DIABETES MELLITUS: ICD-10-CM

## 2018-08-21 PROCEDURE — 99203 OFFICE O/P NEW LOW 30 MIN: CPT | Mod: S$PBB,,, | Performed by: PODIATRIST

## 2018-08-21 PROCEDURE — 99999 PR PBB SHADOW E&M-EST. PATIENT-LVL III: CPT | Mod: PBBFAC,,, | Performed by: PODIATRIST

## 2018-08-21 PROCEDURE — 99213 OFFICE O/P EST LOW 20 MIN: CPT | Mod: PBBFAC,25,PO | Performed by: PODIATRIST

## 2018-08-21 PROCEDURE — 73630 X-RAY EXAM OF FOOT: CPT | Mod: 26,LT,, | Performed by: RADIOLOGY

## 2018-08-21 PROCEDURE — 73630 X-RAY EXAM OF FOOT: CPT | Mod: TC,FY,PO,LT

## 2018-08-21 NOTE — PROGRESS NOTES
[facility]  PODIATRIC MEDICINE AND SURGERY  PROGRESS NOTE     CHIEF COMPLAINT  Chief Complaint   Patient presents with    Foot Ulcer     PCP Dr. Chamberlain 07/11/18, Pt states that he was referred here from othorpedic surgeon, he has an ulcer on the ankle of left foot lateral side.  Pt states that he noticed the ulcer in april of this year after a fall.        HPI    SUBJECTIVE: Hossein Will is a 62 y.o. male who  has a past medical history of Bipolar 1 disorder, CKD stage 3 due to type 2 diabetes mellitus, Controlled diabetes mellitus with diabetic polyneuropathy, DM (diabetes mellitus) with complications, Elevated PSA, History of colon polyps, Hyperlipidemia associated with type 2 diabetes mellitus, Hypertension, Hyperuricemia, Knee effusion, left, Pacemaker, and Type II diabetes mellitus with renal manifestations. Hosseinpresents to clinic for high risk diabetic foot exam and care.  Hossein  Relates that he has an ulcer on his left ankle for the past 4 months.  He has been treated at the wound Care Clinic in [facility].  He relates that he has home health nurse coming twice a week and he see a wound care physician every Wednesday.  He relates that he has the honey consistency type of wound care product applied to the ankle.  He does admit to slow healing ulceration developed after injury to the ankle.  Denies any pain to the site.  He is accompanied today with his mother.  He has no further pedal complaints at this time.   He does state that he was previously routinely seen for at-risk nail care by Dr. Whitaker.  He would like to establish care with a new podiatrist.    This patient has documented high risk feet requiring routine maintenance secondary to diabetes mellitis and those secondary complications of diabetes, as mentioned.      HgA1c:   Hemoglobin A1C   Date Value Ref Range Status   05/24/2018 5.6 4.0 - 5.6 % Final     Comment:     According to ADA guidelines, hemoglobin A1c <7.0%  represents  optimal control in non-pregnant diabetic patients. Different  metrics may apply to specific patient populations.   Standards of Medical Care in Diabetes-2016.  For the purpose of screening for the presence of diabetes:  <5.7%     Consistent with the absence of diabetes  5.7-6.4%  Consistent with increasing risk for diabetes   (prediabetes)  >or=6.5%  Consistent with diabetes  Currently, no consensus exists for use of hemoglobin A1c  for diagnosis of diabetes for children.  This Hemoglobin A1c assay has significant interference with fetal   hemoglobin   (HbF). The results are invalid for patients with abnormal amounts of   HbF,   including those with known Hereditary Persistence   of Fetal Hemoglobin. Heterozygous hemoglobin variants (HbAS, HbAC,   HbAD, HbAE, HbA2) do not significantly interfere with this assay;   however, presence of multiple variants in a sample may impact the %   interference.     03/05/2018 7.2 (H) 4.0 - 5.6 % Final     Comment:     According to ADA guidelines, hemoglobin A1c <7.0% represents  optimal control in non-pregnant diabetic patients. Different  metrics may apply to specific patient populations.   Standards of Medical Care in Diabetes-2016.  For the purpose of screening for the presence of diabetes:  <5.7%     Consistent with the absence of diabetes  5.7-6.4%  Consistent with increasing risk for diabetes   (prediabetes)  >or=6.5%  Consistent with diabetes  Currently, no consensus exists for use of hemoglobin A1c  for diagnosis of diabetes for children.  This Hemoglobin A1c assay has significant interference with fetal   hemoglobin   (HbF). The results are invalid for patients with abnormal amounts of   HbF,   including those with known Hereditary Persistence   of Fetal Hemoglobin. Heterozygous hemoglobin variants (HbAS, HbAC,   HbAD, HbAE, HbA2) do not significantly interfere with this assay;   however, presence of multiple variants in a sample may impact the %  "  interference.           PMH  Past Medical History:   Diagnosis Date    Bipolar 1 disorder     CKD stage 3 due to type 2 diabetes mellitus     Controlled diabetes mellitus with diabetic polyneuropathy     DM (diabetes mellitus) with complications     Elevated PSA     History of colon polyps     Hyperlipidemia associated with type 2 diabetes mellitus     Hypertension     Hyperuricemia     Knee effusion, left     Pacemaker     Type II diabetes mellitus with renal manifestations      Patient Active Problem List   Diagnosis    DM (diabetes mellitus) with complications    Type II diabetes mellitus with renal manifestations    Hyperlipidemia associated with type 2 diabetes mellitus    Bipolar 1 disorder    CKD stage 3 due to type 2 diabetes mellitus    Hyperuricemia    Knee effusion, left    Elevated PSA    Controlled diabetes mellitus with diabetic polyneuropathy    Pacemaker    Chronic pain of left knee    Complete tear of medial collateral ligament of right knee    Cervical spondylosis with myelopathy and radiculopathy    Lumbosacral spondylosis with radiculopathy    History of colon polyps    Primary osteoarthritis of both knees    Ulcer of left foot       MEDS  Current Outpatient Medications on File Prior to Visit   Medication Sig Dispense Refill    aspirin (ECOTRIN) 81 MG EC tablet Take 81 mg by mouth once daily.      BD INSULIN PEN NEEDLE UF MINI 31 gauge x 3/16" Ndle USE TWICE D  2    BD ULTRA-FINE VINCE PEN NEEDLES 32 gauge x 5/32" Ndle USE ONCE D WITH INSULIN  1    clonazePAM (KLONOPIN) 1 MG tablet TK 1 T PO  Q HS  2    divalproex (DEPAKOTE) 500 MG TbEC Take 2 tablets by mouth nightly.  4    ezetimibe-simvastatin 10-20 mg (VYTORIN) 10-20 mg per tablet Take 1 tablet by mouth nightly.  1    ezetimibe-simvastatin 10-20 mg (VYTORIN) 10-20 mg per tablet Take 1 tablet by mouth.      febuxostat (ULORIC) 40 mg Tab Take 1 tablet (40 mg total) by mouth once daily. 30 tablet 11    " FREESTYLE LANCETS 28 gauge lancets 1 each by Other route once daily.  1    FREESTYLE LITE STRIPS Strp Take 2 tablets by mouth nightly.  1    insulin glargine (LANTUS SOLOSTAR) 100 unit/mL (3 mL) InPn pen Inject 25 Units into the skin.      insulin lispro (HUMALOG) 100 unit/mL injection Inject into the skin.      LANTUS SOLOSTAR U-100 INSULIN 100 unit/mL (3 mL) InPn pen Inject 50 Units into the skin every evening. 1 Box 11    lidocaine-prilocaine (EMLA) cream       prednisoLONE acetate (PRED FORTE) 1 % DrpS INSTILL ONE DROP IN OU BID AS NEEDED  1    QUEtiapine (SEROQUEL) 400 MG tablet Take 400 mg by mouth nightly.  4    SITagliptin (JANUVIA) 100 MG Tab Take 1 tablet (100 mg total) by mouth once daily. 90 tablet 3    tamsulosin (FLOMAX) 0.4 mg Cp24 TK 1 C PO QD  6    traMADol (ULTRAM) 50 mg tablet tramadol 50 mg tablet       No current facility-administered medications on file prior to visit.        PSH     Past Surgical History:   Procedure Laterality Date    HERNIA REPAIR      KNEE SURGERY      TRANSURETHRAL RESECTION OF PROSTATE          ALL  Review of patient's allergies indicates:   Allergen Reactions    Abilify [aripiprazole] Other (See Comments) and Anaphylaxis     Caused coma       SOC     Social History     Tobacco Use    Smoking status: Never Smoker    Smokeless tobacco: Never Used   Substance Use Topics    Alcohol use: No    Drug use: No         Family HX  History reviewed. No pertinent family history.         REVIEW OF SYSTEMS  General: Denies any fever or chills  Chest: Denies shortness of breath, wheezing, coughing, or sputum production  Heart: Denies chest pain.  As noted above and per history of current illness above, otherwise negative in the remainder of the 14 systems.     PHYSICAL EXAM  Vitals:    08/21/18 1108   BP: 129/73   Pulse: 88   Weight: 81.2 kg (179 lb 0.2 oz)   Height: 6' (1.829 m)       GEN:  This patient is well-developed, well-nourished and appears stated age,  well-oriented to person, place and time, and cooperative and pleasant on today's visit.      LOWER EXTREMITY    VASCULAR  DP pedal pulse 2/4 RIGHT, LEFT2/4   PT pedal pulse 2/4 RIGHT, LEFT2/4  Capillary refill time immediate to the toes.   Feet are warm to the touch. Skin temperature warm to warm from proximally to distally   There are varicosities, telangiectasias noted to bilateral foot and ankle regions.   There are no ecchymoses noted to bilateral foot and ankle regions.   There is gross lower extremity edema.    DERMATOLOGIC  Skin moist with healthy texture and turgor.    There is full-thickness ulceration noted on the lateral aspect of left ankle, negative probe to bone, negative drainage, 100% pink granular wound bed  There is no interdigital maceration.   There are no hyperkeratotic lesions noted to feet.    NEUROLOGIC  Protective sensation absent at 0/10 sites upon examination with [place] Weinsten 5.07 g monofilament.  Propioception intact at 1st MTPJ b/l.  Achilles and patellar deep tendon reflexes intact  Babinski reflex absent    ORTHOPEDIC/BIOMECHANICAL  No symptomatic structural abnormalities noted  Muscle strength AT/EHL/EDL/PT: 5/5; Achilles/Gastroc/Soleus: 5/5; PB/PL: 5/5 Muscle tone is normal.  Ankle joint ROM non painful DF/PF, non-crepitus  STJ ROM  inv/ev, non crepitus   MTPJ b/l  DF/PF, non crepitus     The x-ray left foot reveal reviewed and there are no acute findings noted    ASSESSMENT  Type 2 diabetes mellitus with left diabetic foot ulcer  -     X-Ray Foot Complete Left; Future; Expected date: 08/21/2018    Ankle ulcer, left, with fat layer exposed    CKD stage 3 due to type 2 diabetes mellitus        PLAN  -The patient was examined and evaulated  -Discuss presenting problems, etiology, pathologic processes and management options with patient today.   -I counseled the patient on their conditions, their implications and medical management. An in depth discussion on diabetic management,  risk prevention, amputation prevention verbally and provided educational literature in written format.  - will defer treatment of left ankle ulcer by wound care physician, the wound was cleansed and then a dry sterile dressing applied to the site.  Patient informed to continue follow up tomorrow with wound Care Clinic in [facility].  - Shoe inspection. Diabetic Foot Education. Patient reminded of the importance of good nutrition and blood sugar control to help prevent podiatric complications of diabetes. Patient instructed on proper foot hygeine. We discussed wearing proper shoe gear, daily foot inspections, never walking without protective shoe gear, never putting sharp instruments to feet, routine podiatric visits every 3-4 months      Disclaimer: This note was partially prepared using a voice recognition system and is likely to have sound alike errors within the text.        Future Appointments   Date Time Provider Department Center   8/31/2018  8:40 AM LABORATORY, [facility]   9/6/2018 12:20 PM Jessica Topete MD [facility] PHYS [place]   9/7/2018  2:40 PM Seun Chamberlain MD [facility]   11/19/2018 11:00 AM Nikos Bedolla MD [facility] INT ADITI [place]   11/30/2018 11:40 AM Ken Macdonald MD [facility] ORTHO [place]   2/18/2019 10:40 AM Ken Macdonald MD [facility] ORTHO [place]       Report Electronically Signed By:     Yareli Smith DPM   Podiatry  [facility]  8/21/2018

## 2018-08-21 NOTE — LETTER
August 21, 2018      Ken Macdonald MD  [address] [place] [address]           [facility] - Podiatry  [address]  [place] [address]  Phone: [phone]  Fax: [phone]          Patient: Hossein Will   MR Number: [id number]   YOB: 1955   Date of Visit: 8/21/2018       Dear Dr. Ken Macdonald:    Thank you for referring Hossein Will to me for evaluation. Attached you will find relevant portions of my assessment and plan of care.    If you have questions, please do not hesitate to call me. I look forward to following Hossein Will along with you.    Sincerely,    Yareli Smith, DARIO    Enclosure  CC:  No Recipients    If you would like to receive this communication electronically, please contact [email or web address] or [phone] to request more information on [facility] access.    For providers and/or their staff who would like to refer a patient to [facility], please contact us through our one-stop-shop provider referral line, [facility] , at [phone].    If you feel you have received this communication in error or would no longer like to receive these types of communications, please e-mail [email or web address]

## 2018-08-24 ENCOUNTER — TELEPHONE (OUTPATIENT)
Dept: INTERNAL MEDICINE | Facility: CLINIC | Age: 63
End: 2018-08-24

## 2018-08-24 RX ORDER — EZETIMIBE AND SIMVASTATIN 10; 20 MG/1; MG/1
1 TABLET ORAL NIGHTLY
Qty: 30 TABLET | Refills: 11 | Status: SHIPPED | OUTPATIENT
Start: 2018-08-24 | End: 2019-02-26

## 2018-08-24 NOTE — TELEPHONE ENCOUNTER
----- Message from Yasmine Soriano sent at 8/24/2018  1:39 PM CDT -----  Contact: self/[phone]  Would like to consult with nurse regarding medication(Ezetimibe/simvastatin 10-20mg), please call back at [phone]. Thanks/ar

## 2018-08-28 ENCOUNTER — TELEPHONE (OUTPATIENT)
Dept: INTERNAL MEDICINE | Facility: CLINIC | Age: 63
End: 2018-08-28

## 2018-08-28 NOTE — TELEPHONE ENCOUNTER
Attempt to contact pt to see if he wanted to change his simvastatin to 90 day supply , unable to leave vm

## 2018-08-31 ENCOUNTER — LAB VISIT (OUTPATIENT)
Dept: LAB | Facility: HOSPITAL | Age: 63
End: 2018-08-31
Attending: INTERNAL MEDICINE
Payer: MEDICARE

## 2018-08-31 DIAGNOSIS — E11.42 CONTROLLED TYPE 2 DIABETES MELLITUS WITH DIABETIC POLYNEUROPATHY, WITHOUT LONG-TERM CURRENT USE OF INSULIN: ICD-10-CM

## 2018-08-31 DIAGNOSIS — E79.0 HYPERURICEMIA: ICD-10-CM

## 2018-08-31 LAB
ALBUMIN SERPL BCP-MCNC: 4 G/DL
ALP SERPL-CCNC: 87 U/L
ALT SERPL W/O P-5'-P-CCNC: 7 U/L
ANION GAP SERPL CALC-SCNC: 9 MMOL/L
AST SERPL-CCNC: 18 U/L
BILIRUB SERPL-MCNC: 0.5 MG/DL
BUN SERPL-MCNC: 43 MG/DL
CALCIUM SERPL-MCNC: 10.3 MG/DL
CHLORIDE SERPL-SCNC: 105 MMOL/L
CHOLEST SERPL-MCNC: 121 MG/DL
CHOLEST/HDLC SERPL: 2.9 {RATIO}
CO2 SERPL-SCNC: 26 MMOL/L
CREAT SERPL-MCNC: 2.1 MG/DL
EST. GFR  (AFRICAN AMERICAN): 37.3 ML/MIN/1.73 M^2
EST. GFR  (NON AFRICAN AMERICAN): 32.3 ML/MIN/1.73 M^2
ESTIMATED AVG GLUCOSE: 151 MG/DL
GLUCOSE SERPL-MCNC: 120 MG/DL
HBA1C MFR BLD HPLC: 6.9 %
HDLC SERPL-MCNC: 42 MG/DL
HDLC SERPL: 34.7 %
LDLC SERPL CALC-MCNC: 64.4 MG/DL
NONHDLC SERPL-MCNC: 79 MG/DL
POTASSIUM SERPL-SCNC: 5.1 MMOL/L
PROT SERPL-MCNC: 7.6 G/DL
SODIUM SERPL-SCNC: 140 MMOL/L
TRIGL SERPL-MCNC: 73 MG/DL
URATE SERPL-MCNC: 3.6 MG/DL

## 2018-08-31 PROCEDURE — 80053 COMPREHEN METABOLIC PANEL: CPT

## 2018-08-31 PROCEDURE — 36415 COLL VENOUS BLD VENIPUNCTURE: CPT | Mod: PO

## 2018-08-31 PROCEDURE — 84550 ASSAY OF BLOOD/URIC ACID: CPT

## 2018-08-31 PROCEDURE — 83036 HEMOGLOBIN GLYCOSYLATED A1C: CPT

## 2018-08-31 PROCEDURE — 80061 LIPID PANEL: CPT

## 2018-09-06 ENCOUNTER — OFFICE VISIT (OUTPATIENT)
Dept: PHYSICAL MEDICINE AND REHAB | Facility: CLINIC | Age: 63
End: 2018-09-06
Payer: MEDICARE

## 2018-09-06 VITALS
SYSTOLIC BLOOD PRESSURE: 132 MMHG | DIASTOLIC BLOOD PRESSURE: 71 MMHG | BODY MASS INDEX: 24.24 KG/M2 | WEIGHT: 179 LBS | HEIGHT: 72 IN | HEART RATE: 93 BPM | RESPIRATION RATE: 14 BRPM

## 2018-09-06 DIAGNOSIS — R29.898 HAND WEAKNESS: Primary | ICD-10-CM

## 2018-09-06 DIAGNOSIS — R20.2 PARESTHESIA OF HAND, BILATERAL: ICD-10-CM

## 2018-09-06 PROCEDURE — 99213 OFFICE O/P EST LOW 20 MIN: CPT | Mod: PBBFAC,PO | Performed by: PHYSICAL MEDICINE & REHABILITATION

## 2018-09-06 PROCEDURE — 99204 OFFICE O/P NEW MOD 45 MIN: CPT | Mod: S$PBB,,, | Performed by: PHYSICAL MEDICINE & REHABILITATION

## 2018-09-06 PROCEDURE — 99999 PR PBB SHADOW E&M-EST. PATIENT-LVL III: CPT | Mod: PBBFAC,,, | Performed by: PHYSICAL MEDICINE & REHABILITATION

## 2018-09-06 NOTE — PROGRESS NOTES
PM&R NEW PATIENT HISTORY & PHYSICAL :    Referring Physician:    Chief Complaint   Patient presents with    Numbness     bilateral hand numbness, weakness       HPI: This is a 62 y.o.  male being seen in clinic today for evaluation of bilateral hand weakness and numbness/tingling that has worsened since April.  He initially noted muscle atrophy in one hand and then the other.  He has started OT and sees the therapist 2x/week to work on strengthening. With increased hand usage he may experience more numbness/tingling (worse in the 4th and 5th digits).    History obtained from patient and family member    Functional History:  Walking: Not limited  Transfers: Independent  Assistive devices: walker  Power mobility: No  Falls: None     Needs help with:  Nothing - all ADLS normal    Cooking   Cleaning  Bathing   Dressing   Toileting     Past family, medical, social, and surgical history reviewed in chart    Review of Systems:     General- denies lethargy, weight change, fever, chills  Head/neck- denies swallowing difficulties  ENT- denies hearing changes  Cardiovascular-denies chest pain  Pulmonary- denies shortness of breath  GI- denies constipation or bowel incontinence  - denies bladder incontinence  Skin- denies wounds or rashes  Musculoskeletal- +weakness, +/-pain  Neurologic- +numbness and tingling  Psychiatric- denies depressive or psychotic features, denies anxiety  Lymphatic-denies swelling  Endocrine- denies hypoglycemic symptoms/+DM history  All other pertinent systems negative     Physical Examination:  General: Well developed, well nourished male, NAD  HEENT:NCAT EOMI bilaterally   Pulmonary:Normal respirations    Spinal Examination: CERVICAL  Active ROM is within normal limits.  Inspection: No deformity of spinal alignment.  Palpation: No vertebral tenderness to percussion.  Not ttp at trapezius or rhomboids  Spurling test: neg    Spinal Examination: LUMBAR or THORACIC  Active ROM is within normal  limits.  Inspection: No deformity of spinal alignment.     Musculoskeletal Tests:    Elbow compression (ulnar): neg  Tinels at wrist: neg  Phalen: neg    Bilateral Upper and Lower Extremities:  Pulses are 2+ at radial,  bilaterally.  Shoulder/Elbow/Wrist/Hand ROM wnl  Hip/Knee/Ankle ROM   Bilateral Extremities show normal capillary refill.  No signs of cyanosis, rubor, edema, skin changes, or dysvascular changes of appendages.  Nails appear intact.    Neurological Exam:  Cranial Nerves:  II-XII grossly intact    Manual Muscle Testing: (Motor 5=normal)    RIGHT Upper extremity: Shoulder abduction 5/5, Biceps 5/5, Triceps 5/5, Wrist extension 5/5, Abductor pollicis brevis 4/5, Ulnar hand intrinsics 3+/5,  LEFT Upper extremity: Shoulder abduction 5/5, Biceps 5/5, Triceps 5/5, Wrist extension 5/5, Abductor pollicis brevis 4/5, Ulnar hand intrinsics 3+/5,    No focal atrophy is noted of either upper or lower extremity except +atrophy at interossei and ADM bilaterally     Bilateral Reflexes:hypo at bic tric br  Matthew's response is absent bilaterally.  Sensation: tested to light touch  - intact in arms except dec at 4th and 5th digits  Gait: Narrow base and good arm swing.      IMPRESSION/PLAN: This is a 62 y.o.  male with hand weakness and paresthesias, h/o cervical DJD/DDD    1. Cont OT  2. Schedule EMG upper ext to r/o ulnar neuropathy vs radic  3. Fu for EMG next week    Jessica Topete M.D.  Physical Medicine and Rehab

## 2018-09-07 ENCOUNTER — OFFICE VISIT (OUTPATIENT)
Dept: INTERNAL MEDICINE | Facility: CLINIC | Age: 63
End: 2018-09-07
Payer: MEDICARE

## 2018-09-07 VITALS
BODY MASS INDEX: 25.56 KG/M2 | RESPIRATION RATE: 16 BRPM | HEART RATE: 84 BPM | OXYGEN SATURATION: 97 % | TEMPERATURE: 97 F | SYSTOLIC BLOOD PRESSURE: 140 MMHG | DIASTOLIC BLOOD PRESSURE: 80 MMHG | HEIGHT: 72 IN | WEIGHT: 188.69 LBS

## 2018-09-07 DIAGNOSIS — E11.22 TYPE 2 DIABETES MELLITUS WITH STAGE 3 CHRONIC KIDNEY DISEASE, WITHOUT LONG-TERM CURRENT USE OF INSULIN: ICD-10-CM

## 2018-09-07 DIAGNOSIS — E78.5 HYPERLIPIDEMIA ASSOCIATED WITH TYPE 2 DIABETES MELLITUS: ICD-10-CM

## 2018-09-07 DIAGNOSIS — N18.30 TYPE 2 DIABETES MELLITUS WITH STAGE 3 CHRONIC KIDNEY DISEASE, WITHOUT LONG-TERM CURRENT USE OF INSULIN: ICD-10-CM

## 2018-09-07 DIAGNOSIS — E79.0 HYPERURICEMIA: ICD-10-CM

## 2018-09-07 DIAGNOSIS — Z95.0 PACEMAKER: ICD-10-CM

## 2018-09-07 DIAGNOSIS — E11.69 HYPERLIPIDEMIA ASSOCIATED WITH TYPE 2 DIABETES MELLITUS: ICD-10-CM

## 2018-09-07 DIAGNOSIS — E11.22 CKD STAGE 3 DUE TO TYPE 2 DIABETES MELLITUS: ICD-10-CM

## 2018-09-07 DIAGNOSIS — N18.30 CKD STAGE 3 DUE TO TYPE 2 DIABETES MELLITUS: ICD-10-CM

## 2018-09-07 DIAGNOSIS — Z12.5 SCREENING FOR PROSTATE CANCER: ICD-10-CM

## 2018-09-07 DIAGNOSIS — E11.42 CONTROLLED TYPE 2 DIABETES MELLITUS WITH DIABETIC POLYNEUROPATHY, WITHOUT LONG-TERM CURRENT USE OF INSULIN: ICD-10-CM

## 2018-09-07 DIAGNOSIS — E11.8 DM (DIABETES MELLITUS) WITH COMPLICATIONS: Primary | ICD-10-CM

## 2018-09-07 PROCEDURE — 99999 PR PBB SHADOW E&M-EST. PATIENT-LVL III: CPT | Mod: PBBFAC,,, | Performed by: INTERNAL MEDICINE

## 2018-09-07 PROCEDURE — 99214 OFFICE O/P EST MOD 30 MIN: CPT | Mod: S$PBB,,, | Performed by: INTERNAL MEDICINE

## 2018-09-07 PROCEDURE — 99213 OFFICE O/P EST LOW 20 MIN: CPT | Mod: PBBFAC,PO | Performed by: INTERNAL MEDICINE

## 2018-09-07 RX ORDER — INSULIN GLARGINE 100 [IU]/ML
35 INJECTION, SOLUTION SUBCUTANEOUS NIGHTLY
Qty: 1 BOX | Refills: 11
Start: 2018-09-07 | End: 2019-04-10 | Stop reason: SDUPTHER

## 2018-09-07 NOTE — PROGRESS NOTES
HPI:   patient is a 62-year-old man who comes today for follow-up of his diabetes, hypertension, chronic kidney disease.  He has been doing well.  He has been seen the wound care clinic for nonhealing ulcer on the left lower extremity ankle.  His been responding nicely to the Unna boots.  His sugars have been doing very well.  Is been to mid between 90 and 130. He denies any hypoglycemic events.  His blood pressures been well controlled.  He has had no palpitations, chest pains, or shortness of breath    Current meds have been verified and updated per the EMR  Exam:BP (!) 140/80   Pulse 84   Temp 96.7 °F (35.9 °C)   Resp 16   Ht 6' (1.829 m)   Wt 85.6 kg (188 lb 11.4 oz)   SpO2 97%   BMI 25.59 kg/m²    chest is clear   cardiovascular regular rate and rhythm without murmur gallop or rub   right lower extremity without edema    Lab Results   Component Value Date    WBC 8.80 06/06/2018    HGB 10.0 (L) 06/06/2018    HCT 30.8 (L) 06/06/2018     (H) 06/06/2018     (H) 06/06/2018    CHOL 121 08/31/2018    TRIG 73 08/31/2018    HDL 42 08/31/2018    ALT 7 (L) 08/31/2018    AST 18 08/31/2018     08/31/2018    K 5.1 08/31/2018     08/31/2018    CREATININE 2.1 (H) 08/31/2018    BUN 43 (H) 08/31/2018    CO2 26 08/31/2018    TSH 1.346 03/05/2018    PSA 5.8 (H) 03/05/2018    HGBA1C 6.9 (H) 08/31/2018       Impression:   multiple medical problems below, stable  Patient Active Problem List   Diagnosis    DM (diabetes mellitus) with complications    Type II diabetes mellitus with renal manifestations    Hyperlipidemia associated with type 2 diabetes mellitus    Bipolar 1 disorder    CKD stage 3 due to type 2 diabetes mellitus    Hyperuricemia    Knee effusion, left    Elevated PSA    Controlled diabetes mellitus with diabetic polyneuropathy    Pacemaker    Chronic pain of left knee    Complete tear of medial collateral ligament of right knee    Cervical spondylosis with myelopathy and  radiculopathy    Lumbosacral spondylosis with radiculopathy    History of colon polyps    Primary osteoarthritis of both knees    Ulcer of left foot       Plan:  Orders Placed This Encounter    Hemoglobin A1c    Comprehensive metabolic panel    Lipid panel    Protein / creatinine ratio, urine    TSH    CBC auto differential    PSA, Screening    LANTUS SOLOSTAR U-100 INSULIN 100 unit/mL (3 mL) InPn pen      he will increase the Lantus insulin to take 35 units at bedtime.  His other medications remain the same.  He will see me in 6 months with the above lab work.

## 2018-09-12 ENCOUNTER — OFFICE VISIT (OUTPATIENT)
Dept: PHYSICAL MEDICINE AND REHAB | Facility: CLINIC | Age: 63
End: 2018-09-12
Payer: MEDICARE

## 2018-09-12 VITALS
WEIGHT: 188 LBS | BODY MASS INDEX: 25.47 KG/M2 | DIASTOLIC BLOOD PRESSURE: 66 MMHG | HEART RATE: 94 BPM | SYSTOLIC BLOOD PRESSURE: 129 MMHG | HEIGHT: 72 IN

## 2018-09-12 DIAGNOSIS — E11.41 MONONEURITIS MULTIPLEX, DIABETIC: ICD-10-CM

## 2018-09-12 DIAGNOSIS — G58.7 MONONEURITIS MULTIPLEX, DIABETIC: ICD-10-CM

## 2018-09-12 PROCEDURE — 99213 OFFICE O/P EST LOW 20 MIN: CPT | Mod: PBBFAC,PO | Performed by: PHYSICAL MEDICINE & REHABILITATION

## 2018-09-12 PROCEDURE — 95911 NRV CNDJ TEST 9-10 STUDIES: CPT | Mod: PBBFAC,PO | Performed by: PHYSICAL MEDICINE & REHABILITATION

## 2018-09-12 PROCEDURE — 95886 MUSC TEST DONE W/N TEST COMP: CPT | Mod: 26,S$PBB,, | Performed by: PHYSICAL MEDICINE & REHABILITATION

## 2018-09-12 PROCEDURE — 95886 MUSC TEST DONE W/N TEST COMP: CPT | Mod: PBBFAC,PO | Performed by: PHYSICAL MEDICINE & REHABILITATION

## 2018-09-12 PROCEDURE — 99214 OFFICE O/P EST MOD 30 MIN: CPT | Mod: 25,S$PBB,, | Performed by: PHYSICAL MEDICINE & REHABILITATION

## 2018-09-12 PROCEDURE — 99999 PR PBB SHADOW E&M-EST. PATIENT-LVL III: CPT | Mod: PBBFAC,,, | Performed by: PHYSICAL MEDICINE & REHABILITATION

## 2018-09-12 PROCEDURE — 95911 NRV CNDJ TEST 9-10 STUDIES: CPT | Mod: 26,S$PBB,, | Performed by: PHYSICAL MEDICINE & REHABILITATION

## 2018-09-12 NOTE — PROGRESS NOTES
[facility] [address] [place] [address]  Phone: [phone]          Full Name: ramírez johnson YOB: 1955  Patient ID: [id number]      Visit Date: 9/12/2018 09:12  Age: 62 Years 8 Months Old  Examining Physician: Jessica Topete M.D.  Referring Physician: Dr Feliciano  Reason for Referral: uex weakness    Chief Complaint   Patient presents with    Weakness     hands       HPI: This is a 62 y.o.  male being seen in clinic today for EMG/NCS of upper exts due to chronic hand weakness and atrophy evaluation of bilateral hand weakness and numbness/tingling that has worsened since April after being hospitalized for almost a month. With increased hand usage he may experience more numbness/tingling (worse in the 4th and 5th digits). Resting his hands and OT still provide some relief.     History obtained from patient and family member    Functional History:  Walking: Not limited  Transfers: Independent  Assistive devices: walker  Power mobility: No  Falls: None     Needs help with:  Nothing - all ADLS normal    Cooking   Cleaning  Bathing   Dressing   Toileting     Past family, medical, social, and surgical history reviewed in chart    Review of Systems:     General- denies lethargy, weight change, fever, chills  Head/neck- denies swallowing difficulties  ENT- denies hearing changes  Cardiovascular-denies chest pain  Pulmonary- denies shortness of breath  GI- denies constipation or bowel incontinence  - denies bladder incontinence  Skin- denies wounds or rashes  Musculoskeletal- +weakness, +/-pain  Neurologic- +numbness and tingling  Psychiatric- denies depressive or psychotic features, denies anxiety  Lymphatic-denies swelling  Endocrine- denies hypoglycemic symptoms/+DM history  All other pertinent systems negative     Physical Examination:  General: Well developed, well nourished male, NAD  HEENT:NCAT EOMI bilaterally   Pulmonary:Normal respirations    Spinal Examination:  CERVICAL  Active ROM is within normal limits.  Inspection: No deformity of spinal alignment.  Palpation:  Not ttp at trapezius or rhomboids    Spinal Examination: LUMBAR or THORACIC  Active ROM is within normal limits.  Inspection: No deformity of spinal alignment.     Musculoskeletal Tests:    Elbow compression (ulnar): neg  Tinels at wrist: neg  Phalen: neg    Bilateral Upper and Lower Extremities:  Pulses are 2+ at radial,  bilaterally.  Shoulder/Elbow/Wrist/Hand ROM wnl  Hip/Knee/Ankle ROM   Bilateral Extremities show normal capillary refill.  No signs of cyanosis, rubor, edema, skin changes, or dysvascular changes of appendages.  Nails appear intact.    Neurological Exam:  Cranial Nerves:  II-XII grossly intact    Manual Muscle Testing: (Motor 5=normal)    RIGHT Upper extremity: Shoulder abduction 5/5, Biceps 5/5, Triceps 5/5, Wrist extension 5/5, Abductor pollicis brevis 4/5, Ulnar hand intrinsics 3+/5,  LEFT Upper extremity: Shoulder abduction 5/5, Biceps 5/5, Triceps 5/5, Wrist extension 5/5, Abductor pollicis brevis 4/5, Ulnar hand intrinsics 3+/5,    No focal atrophy is noted of either upper or lower extremity except +atrophy at interossei and ADM bilaterally     Bilateral Reflexes:hypo at bic tric br  Sensation: tested to light touch  - intact in arms except dec at 4th and 5th digits  Gait: walker, limited hip and ankle ROM       Entire procedure explained to patient prior to proceeding.  Verbal consent obtained        SNC      Nerve / Sites Rec. Site Onset Lat Peak Lat Amp Segments Distance Velocity     ms ms µV  mm m/s   L Median - Digit II (Antidromic)      Wrist Dig II 3.8 4.4 3.9 Wrist - Dig  37   R Median - Digit II (Antidromic)      Wrist Dig II 3.6 4.5 10.0 Wrist - Dig  38   L Ulnar - Digit V (Antidromic)      Wrist Dig V 4.5 5.1 4.7 Wrist - Dig V 140 31   R Ulnar - Digit V (Antidromic)      Wrist Dig V NR NR NR Wrist - Dig V 140 NR   L Radial - Anatomical snuff box (Forearm)       Forearm Wrist 2.3 3.3 5.6 Forearm - Wrist 100 44   R Radial - Anatomical snuff box (Forearm)      Forearm Wrist 3.0 3.8 6.7 Forearm - Wrist 100 34       MNC      Nerve / Sites Muscle Latency Amplitude Duration Rel Amp Segments Distance Lat Diff Velocity     ms mV ms %  mm ms m/s   L Median - APB      Wrist APB 3.9 8.9 6.0 100 Wrist - APB 80        Elbow APB 10.4 8.5 6.9 95.5 Elbow - Wrist 260 6.5 40   R Median - APB      Wrist APB 4.2 9.0 6.1 100 Wrist - APB 80        Elbow APB 11.0 7.4 6.7 82.1 Elbow - Wrist 255 6.8 38   L Ulnar - ADM      Wrist ADM 5.1 0.6 6.6 100 Wrist - ADM 80        B.Elbow ADM 14.4 0.4  69.1 B.Elbow - Wrist 270 9.4 29      A.Elbow ADM 18.2 0.1  34.1 A.Elbow - B.Elbow 110 3.7 29         A.Elbow - Wrist  13.1    R Ulnar - ADM      Wrist ADM NR NR NR NR Wrist - ADM 80        B.Elbow ADM     B.Elbow - Wrist  NR       A.Elbow ADM     A.Elbow - B.Elbow            A.Elbow - Wrist  NR        EMG            EMG Summary Table     Spontaneous MUAP Recruitment   Muscle IA Fib PSW Fasc Other Amp Dur Polys Pattern Effort   R. First dorsal interosseous Decr 2+ 2+ None . Decr Sl Incr N 1 muap Max   R. Abductor pollicis brevis N None None None . N n N sl red Max   R. Abductor digiti minimi (manus) Decr 1+ 1+ None .    No Activity    R. Flexor carpi ulnaris Decr 1+ 1+ None . Decr n 1+ Discrete Max   R. Pronator teres N None None None . N n N N Max   R. Biceps brachii N None None None . N n N N Max   R. Triceps brachii N None None None . N n N N Max   R. Deltoid N None None None . N n N N Max   R. Cervical paraspinals N None None None .        L. First dorsal interosseous Decr 2+ 2+ None . Decr Gr Incr N 1 muap Max   L. Abductor pollicis brevis N None None None . N n N sl red Max                                    INTERPRETATION  -Bilateral median motor nerve conduction study showed normal latency, amplitude, and dec conduction velocity  -Bilateral median sensory nerve conduction study showed prolonged peak  latency and dec amplitude on the left  -Left ulnar motor nerve conduction study showed prolonged latency, dec amplitude, and conduction velocity  -Right ulnar motor nerve conduction study showed no response to stimulation  -Left ulnar sensory nerve conduction study showed prolonged peak latency and dec amplitude  -Right ulnar sensory nerve conduction study showed no response to stimulation  -Bilateral radial sensory nerve conduction study showed prolonged peak latency and dec amplitude      IMPRESSION  1. ABNORMAL study  2. 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  There was no evidence of a cervical radiculopathy     PLAN  1. Follow up with referring provider: Dr Feliciano  2. Cont OT for strengthening.   3. This study is good for one year. If symptoms worsen or do not improve, please re-consult.    Jessica Topete M.D.  Physical Medicine and Rehab

## 2018-09-12 NOTE — LETTER
September 12, 2018      Jonh Feliciano MD  [address] [facility] [place] [address]           [facility] - Physiatry  [address] [place] [address]  Phone: [phone]  Fax: [phone]          Patient: Hossein Will   MR Number: [id number]   YOB: 1955   Date of Visit: 9/12/2018       Dear Dr. Jonh Feliciano:    Thank you for referring Hossein Will to me for evaluation. Attached you will find relevant portions of my assessment and plan of care.    If you have questions, please do not hesitate to call me. I look forward to following Hossein Will along with you.    Sincerely,    Jessica Topete MD    Enclosure  CC:  No Recipients    If you would like to receive this communication electronically, please contact [email or web address] or [phone] to request more information on [facility] access.    For providers and/or their staff who would like to refer a patient to [facility], please contact us through our one-stop-shop provider referral line, [facility], at [phone].    If you feel you have received this communication in error or would no longer like to receive these types of communications, please e-mail [email or web address]

## 2018-09-18 ENCOUNTER — TELEPHONE (OUTPATIENT)
Dept: ADMINISTRATIVE | Facility: CLINIC | Age: 63
End: 2018-09-18

## 2018-11-12 ENCOUNTER — TELEPHONE (OUTPATIENT)
Dept: INTERNAL MEDICINE | Facility: CLINIC | Age: 63
End: 2018-11-12

## 2018-11-12 NOTE — TELEPHONE ENCOUNTER
Patient needs to go to the emergency room.  It the patient is unwilling, then family needs to call an ambulance to take him to the emergency room.

## 2018-11-12 NOTE — TELEPHONE ENCOUNTER
[facility] notified per Dr. Chamberlain that patient should be taken to the ER or an ambulance should be called to come get him. Wife states patient at this time is in the process of moving out. Wife states she is fine and knows what procedures to take if necessary. Home Health will follow up.

## 2018-11-14 ENCOUNTER — OFFICE VISIT (OUTPATIENT)
Dept: INTERNAL MEDICINE | Facility: CLINIC | Age: 63
End: 2018-11-14
Payer: MEDICARE

## 2018-11-14 VITALS
RESPIRATION RATE: 16 BRPM | OXYGEN SATURATION: 98 % | WEIGHT: 194.88 LBS | TEMPERATURE: 98 F | SYSTOLIC BLOOD PRESSURE: 169 MMHG | DIASTOLIC BLOOD PRESSURE: 71 MMHG | BODY MASS INDEX: 25.83 KG/M2 | HEIGHT: 73 IN | HEART RATE: 80 BPM

## 2018-11-14 DIAGNOSIS — H61.23 BILATERAL IMPACTED CERUMEN: Primary | ICD-10-CM

## 2018-11-14 PROCEDURE — 99214 OFFICE O/P EST MOD 30 MIN: CPT | Mod: PBBFAC,PO | Performed by: INTERNAL MEDICINE

## 2018-11-14 PROCEDURE — 99999 PR PBB SHADOW E&M-EST. PATIENT-LVL IV: CPT | Mod: PBBFAC,,, | Performed by: INTERNAL MEDICINE

## 2018-11-14 PROCEDURE — 99213 OFFICE O/P EST LOW 20 MIN: CPT | Mod: S$PBB,,, | Performed by: INTERNAL MEDICINE

## 2018-11-16 ENCOUNTER — TELEPHONE (OUTPATIENT)
Dept: INTERNAL MEDICINE | Facility: CLINIC | Age: 63
End: 2018-11-16

## 2018-11-16 NOTE — TELEPHONE ENCOUNTER
----- Message from Irina Finch sent at 11/16/2018  9:56 AM CST -----  Contact: self [phone]  States that he has questions regarding using wax remover. Please call back at [phone]//thank you acc

## 2018-12-10 ENCOUNTER — TELEPHONE (OUTPATIENT)
Dept: ADMINISTRATIVE | Facility: CLINIC | Age: 63
End: 2018-12-10

## 2018-12-17 ENCOUNTER — TELEPHONE (OUTPATIENT)
Dept: INTERNAL MEDICINE | Facility: CLINIC | Age: 63
End: 2018-12-17

## 2018-12-17 ENCOUNTER — OFFICE VISIT (OUTPATIENT)
Dept: ORTHOPEDICS | Facility: CLINIC | Age: 63
End: 2018-12-17
Payer: MEDICARE

## 2018-12-17 VITALS
WEIGHT: 194 LBS | DIASTOLIC BLOOD PRESSURE: 67 MMHG | TEMPERATURE: 97 F | HEIGHT: 73 IN | BODY MASS INDEX: 25.71 KG/M2 | RESPIRATION RATE: 18 BRPM | HEART RATE: 72 BPM | SYSTOLIC BLOOD PRESSURE: 107 MMHG

## 2018-12-17 DIAGNOSIS — M17.0 PRIMARY OSTEOARTHRITIS OF BOTH KNEES: Primary | ICD-10-CM

## 2018-12-17 PROCEDURE — 99213 OFFICE O/P EST LOW 20 MIN: CPT | Mod: PBBFAC,PO | Performed by: ORTHOPAEDIC SURGERY

## 2018-12-17 PROCEDURE — 99213 OFFICE O/P EST LOW 20 MIN: CPT | Mod: S$PBB,,, | Performed by: ORTHOPAEDIC SURGERY

## 2018-12-17 PROCEDURE — 99999 PR PBB SHADOW E&M-EST. PATIENT-LVL III: CPT | Mod: PBBFAC,,, | Performed by: ORTHOPAEDIC SURGERY

## 2018-12-17 RX ORDER — ASCORBIC ACID 500 MG
500 TABLET ORAL
COMMUNITY
Start: 2018-06-29 | End: 2021-09-28

## 2018-12-17 RX ORDER — EZETIMIBE 10 MG/1
10 TABLET ORAL
COMMUNITY
End: 2019-02-26

## 2018-12-17 NOTE — PROGRESS NOTES
Subjective:     Patient ID: Hossein Will is a 62 y.o. male.    Chief Complaint: Pain of the Left Knee    Follow up for left knee pain and weakness. Has some weakness in the leg but still hasnt done PT. Hx of ulcer on leg followed with vascular surgeon and states he recently had a skin graft.      Knee Pain    The pain is present in the left knee. This is a new problem. The current episode started more than 1 month ago. The injury was the result of a falling action while at home. The problem occurs intermittently. The problem has been gradually worsening. The quality of the pain is described as sharp. The pain is at a severity of 0/10. Pertinent negatives include no fever or numbness. The symptoms are aggravated by walking and bearing weight. He has tried injection treatment, cold, oral narcotics and NSAIDs (Cortisone injections, Tramadol, Aleve) for the symptoms. The treatment provided mild relief. Physical therapy was not tried.      Past Medical History:   Diagnosis Date    Bipolar 1 disorder     CKD stage 3 due to type 2 diabetes mellitus     Controlled diabetes mellitus with diabetic polyneuropathy     DM (diabetes mellitus) with complications     Elevated PSA     History of colon polyps     Hyperlipidemia associated with type 2 diabetes mellitus     Hypertension     Hyperuricemia     Knee effusion, left     Pacemaker     Type II diabetes mellitus with renal manifestations      Past Surgical History:   Procedure Laterality Date    HERNIA REPAIR      KNEE SURGERY      TRANSURETHRAL RESECTION OF PROSTATE       History reviewed. No pertinent family history.  Social History     Socioeconomic History    Marital status:      Spouse name: Not on file    Number of children: Not on file    Years of education: Not on file    Highest education level: Not on file   Social Needs    Financial resource strain: Not on file    Food insecurity - worry: Not on file    Food insecurity - inability:  "Not on file    Transportation needs - medical: Not on file    Transportation needs - non-medical: Not on file   Occupational History    Not on file   Tobacco Use    Smoking status: Never Smoker    Smokeless tobacco: Never Used   Substance and Sexual Activity    Alcohol use: No    Drug use: No    Sexual activity: No   Other Topics Concern    Not on file   Social History Narrative    Not on file        Medication List           Accurate as of 12/17/18 12:04 PM. If you have any questions, ask your nurse or doctor.               CONTINUE taking these medications    AFLURIA QUAD 9180-6500 (PF) 60 mcg/0.5 mL vaccine  Generic drug:  influenza     ascorbic acid (vitamin C) 500 MG tablet  Commonly known as:  VITAMIN C     aspirin 81 MG EC tablet  Commonly known as:  ECOTRIN     * BD ULTRA-FINE MINI PEN NEEDLE 31 gauge x 3/16" Ndle  Generic drug:  pen needle, diabetic     * BD ULTRA-FINE VINCE PEN NEEDLE 32 gauge x 5/32" Ndle  Generic drug:  pen needle, diabetic     clonazePAM 1 MG tablet  Commonly known as:  KLONOPIN     divalproex 500 MG Tbec  Commonly known as:  DEPAKOTE     ezetimibe 10 mg tablet  Commonly known as:  ZETIA     ezetimibe-simvastatin 10-20 mg 10-20 mg per tablet  Commonly known as:  VYTORIN  Take 1 tablet by mouth nightly.     febuxostat 40 mg Tab  Commonly known as:  ULORIC  Take 1 tablet (40 mg total) by mouth once daily.     FREESTYLE LANCETS 28 gauge Misc  Generic drug:  lancets     FREESTYLE LITE STRIPS Strp  Generic drug:  blood sugar diagnostic     LANTUS SOLOSTAR U-100 INSULIN glargine 100 units/mL (3mL) SubQ pen  Generic drug:  insulin  Inject 35 Units into the skin every evening.     lidocaine-prilocaine cream  Commonly known as:  EMLA     prednisoLONE acetate 1 % Drps  Commonly known as:  PRED FORTE     QUEtiapine 400 MG tablet  Commonly known as:  SEROQUEL     SITagliptin 100 MG Tab  Commonly known as:  JANUVIA  Take 1 tablet (100 mg total) by mouth once daily.     tamsulosin 0.4 mg " Cap  Commonly known as:  FLOMAX     traMADol 50 mg tablet  Commonly known as:  ULTRAM         * This list has 2 medication(s) that are the same as other medications prescribed for you. Read the directions carefully, and ask your doctor or other care provider to review them with you.              Review of patient's allergies indicates:   Allergen Reactions    Abilify [aripiprazole] Other (See Comments)     Caused coma     Review of Systems   Constitution: Negative for fever and night sweats.   HENT: Negative for hearing loss.    Eyes: Negative for blurred vision and visual disturbance.   Cardiovascular: Negative for chest pain and leg swelling.   Respiratory: Negative for shortness of breath.    Endocrine: Negative for polyuria.   Hematologic/Lymphatic: Negative for bleeding problem.   Skin: Negative for rash.   Musculoskeletal: Positive for joint pain, joint swelling and muscle weakness. Negative for back pain and muscle cramps.   Gastrointestinal: Negative for melena.   Genitourinary: Negative for hematuria.   Neurological: Negative for loss of balance, numbness and paresthesias.   Psychiatric/Behavioral: Negative for altered mental status.       Objective:   Body mass index is 25.6 kg/m².  Vitals:    12/17/18 1146   BP: 107/67   Pulse: 72   Resp: 18   Temp: 97.1 °F (36.2 °C)       General: Hossein is well-developed, well-nourished, appears stated age, in no acute distress, alert and oriented to time, place and person.       General    Vitals reviewed.  Constitutional: He is oriented to person, place, and time. He appears well-developed and well-nourished. No distress.   HENT:   Mouth/Throat: No oropharyngeal exudate.   Eyes: Right eye exhibits no discharge. Left eye exhibits no discharge.   Neck: Normal range of motion.   Pulmonary/Chest: Effort normal. No respiratory distress.   Neurological: He is alert and oriented to person, place, and time. He has normal reflexes. No cranial nerve deficit. Coordination  normal.   Psychiatric: He has a normal mood and affect. His behavior is normal. Judgment and thought content normal.           Right Knee Exam     Inspection   Erythema: absent  Scars: absent  Swelling: absent  Effusion: absent  Deformity: absent  Bruising: absent    Tenderness   The patient is tender to palpation of the medial joint line, lateral joint line and condyle.    Crepitus   The patient has crepitus of the patella.    Range of Motion   Extension: 10   Flexion: 90     Tests   Ligament Examination Lachman: normal (-1 to 2mm) PCL-Posterior Drawer: normal (0 to 2mm)     Posterior Sag Test: negative  Patella   Patellar apprehension: negative  Passive Patellar Tilt: neutral  Patellar Glide (quadrants): Lateral - 1   Medial - 2  Patellar Grind: positive    Other   Meniscal Cyst: absent  Popliteal (Baker's) Cyst: absent  Sensation: normal    Left Knee Exam     Inspection   Erythema: absent  Scars: absent  Swelling: absent  Effusion: absent  Deformity: absent  Bruising: absent    Tenderness   The patient tender to palpation of the medial joint line.    Crepitus   The patient has crepitus of the patella.    Range of Motion   Extension: 0   Flexion: 100     Tests   Stability Lachman: normal (-1 to 2mm) PCL-Posterior Drawer: normal (0 to 2mm)  Posterior Sag Test: negative  Patella   Patellar apprehension: negative  Passive Patellar Tilt: neutral  Patellar Glide (Quadrants): Lateral - 1 Medial - 2  Patellar Grind: positive    Other   Meniscal Cyst: absent  Popliteal (Baker's) Cyst: absent  Sensation: normal    Comments:  Gastroc atrophy.    Right Hip Exam     Tests   Sabas: negative  Left Hip Exam     Tests   Sabas: negative          Muscle Strength   Right Lower Extremity   Quadriceps:  4/5   Hamstrin/5   Left Lower Extremity   Quadriceps:  3/5   Hamstrin/5     Vascular Exam     Right Pulses  Dorsalis Pedis:      2+  Posterior Tibial:      2+        Left Pulses  Dorsalis Pedis:      2+  Posterior Tibial:       2+        X-rays including standing, weight bearing AP and flexion bilateral knees, lateral and merchant views ordered and images reviewed by me show:    History of orif plateau right knee with hardware and postraumatic osteoarthritis.   Left knee tricompartmental osteoarthritis.      Assessment:     Encounter Diagnosis   Name Primary?    Primary osteoarthritis of both knees Yes        Plan:     1.Continue hinged knee brace-states he wears it most of the day    2. PT for strengthening-given external referral states he will start this week    Poor surgical candidate due to severe muscle weakness and nonhealing ulcer to the leg. Can f/up 6mo with nonop ortho. If no improvement in weakness can consider neurology referral at that time. Has not had progression of his weakness over time.

## 2018-12-17 NOTE — TELEPHONE ENCOUNTER
Have the  from the home health agency go out and assess his living conditions.  There is nothing I can do personally.

## 2018-12-17 NOTE — TELEPHONE ENCOUNTER
----- Message from Costa Cintron sent at 12/17/2018 10:16 AM CST -----  Contact: Malu( [facility] )  Nurse calling in pt's bp 88/50 and pt took some old medication. Nurse would like cb to ask a few questions          [phone]

## 2018-12-17 NOTE — TELEPHONE ENCOUNTER
HH nurse advised us that the pt is living by himself . She stated she believes  his mental status is taking over because her living by himself . She stated that he found a old rx of Meterprol 50 mg from 2016 and it has dropped . She stated that he was gonna drive to apt today . But he couldn't drive . So she called the ex-strange wife and she advised her that she will drive him today but this is the last time. Last week at the wound clinic his bp was in the 160's  Pt unsure of what the bottom number was . She very worried about him . She also stated she doesn't think he's taking his depakote . Please advise

## 2019-01-08 ENCOUNTER — TELEPHONE (OUTPATIENT)
Dept: INTERNAL MEDICINE | Facility: CLINIC | Age: 64
End: 2019-01-08

## 2019-01-08 NOTE — TELEPHONE ENCOUNTER
----- Message from Emma Jernigan sent at 1/8/2019 11:40 AM CST -----  Patient needs call back to get referral for home health [phone]

## 2019-01-08 NOTE — TELEPHONE ENCOUNTER
Spoke with pt.  He is requesting a letter be sent to his apartment manger.  He would like it to read Dear management,  Mr Will has a broken leg.  His broken leg does not allow him to stand up to cook lunch and supper.  Since 11/21/2018 he has lost 40 lbs.  Will you please allow Mr Will out of his lease to move back home for his wife to take care of him.  Abida Reyna manager.   [phone] fax [phone].  Please advise.

## 2019-01-08 NOTE — TELEPHONE ENCOUNTER
Called pt's wife.  She confirms that the pt needs to move home because he is not able to take of his self

## 2019-01-09 ENCOUNTER — TELEPHONE (OUTPATIENT)
Dept: ORTHOPEDICS | Facility: CLINIC | Age: 64
End: 2019-01-09

## 2019-01-09 NOTE — TELEPHONE ENCOUNTER
Spoke with patient's wife informing them that I will fax the PT script. She verbalized her understanding and agreed.  ----- Message from Jose Duval sent at 1/9/2019  3:16 PM CST -----  Contact: Pt. Wife   Pt Wife is calling regarding requesting to have nurse call pt. Pt wife states that Pt misplace the script for PT at [facility] on [facility]  and is asking that a copy  of the PT script be fax#[phone] .[phone]        Thank You   Cally Duval

## 2019-01-10 ENCOUNTER — TELEPHONE (OUTPATIENT)
Dept: INTERNAL MEDICINE | Facility: CLINIC | Age: 64
End: 2019-01-10

## 2019-01-10 NOTE — TELEPHONE ENCOUNTER
Called to confirmed Abida Reyna apt manager and to confirm ph and fax numbers.  Confirmed letter requested by pt faxed to [phone].

## 2019-01-16 ENCOUNTER — TELEPHONE (OUTPATIENT)
Dept: INTERNAL MEDICINE | Facility: CLINIC | Age: 64
End: 2019-01-16

## 2019-01-16 NOTE — TELEPHONE ENCOUNTER
----- Message from Sweta Carreonite sent at 1/16/2019  3:58 PM CST -----  Contact: Pt  Pt called and stated he needs a copy of the letter faxed to him at [phone] that he requested to be faxed to Cody Stanton . He can be reached at [phone].    Thanks,  TF

## 2019-01-16 NOTE — TELEPHONE ENCOUNTER
----- Message from Sweta Carreonite sent at 1/16/2019  3:58 PM CST -----  Contact: Pt  Pt called and stated he needs a copy of the letter faxed to him at [phone] that he requested to be faxed to Cody Stanton . He can be reached at [phone].    Thanks,  TF

## 2019-01-29 LAB — PSA: 1.46

## 2019-02-26 ENCOUNTER — OFFICE VISIT (OUTPATIENT)
Dept: INTERNAL MEDICINE | Facility: CLINIC | Age: 64
End: 2019-02-26
Payer: MEDICARE

## 2019-02-26 VITALS
BODY MASS INDEX: 24.31 KG/M2 | TEMPERATURE: 97 F | OXYGEN SATURATION: 99 % | WEIGHT: 183.44 LBS | SYSTOLIC BLOOD PRESSURE: 98 MMHG | HEART RATE: 105 BPM | HEIGHT: 73 IN | DIASTOLIC BLOOD PRESSURE: 66 MMHG

## 2019-02-26 DIAGNOSIS — M17.0 PRIMARY OSTEOARTHRITIS OF BOTH KNEES: Primary | ICD-10-CM

## 2019-02-26 PROCEDURE — 99999 PR PBB SHADOW E&M-EST. PATIENT-LVL III: CPT | Mod: PBBFAC,,, | Performed by: INTERNAL MEDICINE

## 2019-02-26 PROCEDURE — 99213 OFFICE O/P EST LOW 20 MIN: CPT | Mod: PBBFAC,PO | Performed by: INTERNAL MEDICINE

## 2019-02-26 PROCEDURE — 99213 OFFICE O/P EST LOW 20 MIN: CPT | Mod: S$PBB,,, | Performed by: INTERNAL MEDICINE

## 2019-02-26 PROCEDURE — 99999 PR PBB SHADOW E&M-EST. PATIENT-LVL III: ICD-10-PCS | Mod: PBBFAC,,, | Performed by: INTERNAL MEDICINE

## 2019-02-26 PROCEDURE — 99213 PR OFFICE/OUTPT VISIT, EST, LEVL III, 20-29 MIN: ICD-10-PCS | Mod: S$PBB,,, | Performed by: INTERNAL MEDICINE

## 2019-02-26 RX ORDER — ROSUVASTATIN CALCIUM 40 MG/1
40 TABLET, COATED ORAL NIGHTLY
Qty: 90 TABLET | Refills: 3 | Status: SHIPPED | OUTPATIENT
Start: 2019-02-26 | End: 2019-04-09

## 2019-02-26 RX ORDER — METOPROLOL TARTRATE 25 MG/1
25 TABLET, FILM COATED ORAL DAILY PRN
COMMUNITY
End: 2020-02-20 | Stop reason: SDUPTHER

## 2019-02-26 NOTE — PROGRESS NOTES
"HPI:  Patient is a 63-year-old man with severe osteoarthritis of his left knee who is here today as he would like a referral to see Dr. Jarrod Bassett to have a knee replacement done.    Current meds have been verified and updated per the EMR  Exam:BP 98/66   Pulse 105   Temp 96.5 °F (35.8 °C)   Ht 6' 1" (1.854 m)   Wt 83.2 kg (183 lb 6.8 oz)   SpO2 99%   BMI 24.20 kg/m²   He has severe osteoarthritis of the left knee.    Lab Results   Component Value Date    WBC 8.80 06/06/2018    HGB 10.0 (L) 06/06/2018    HCT 30.8 (L) 06/06/2018     (H) 06/06/2018     (H) 06/06/2018    CHOL 121 08/31/2018    TRIG 73 08/31/2018    HDL 42 08/31/2018    ALT 7 (L) 08/31/2018    AST 18 08/31/2018     08/31/2018    K 5.1 08/31/2018     08/31/2018    CREATININE 2.1 (H) 08/31/2018    BUN 43 (H) 08/31/2018    CO2 26 08/31/2018    TSH 1.346 03/05/2018    PSA 5.8 (H) 03/05/2018    HGBA1C 6.9 (H) 08/31/2018       Impression:  Osteoarthritis left knee  Patient Active Problem List   Diagnosis    DM (diabetes mellitus) with complications    Type II diabetes mellitus with renal manifestations    Hyperlipidemia associated with type 2 diabetes mellitus    Bipolar 1 disorder    CKD stage 3 due to type 2 diabetes mellitus    Hyperuricemia    Knee effusion, left    Elevated PSA    Controlled diabetes mellitus with diabetic polyneuropathy    Pacemaker    Chronic pain of left knee    Complete tear of medial collateral ligament of right knee    Cervical spondylosis with myelopathy and radiculopathy    Lumbosacral spondylosis with radiculopathy    History of colon polyps    Primary osteoarthritis of both knees    Ulcer of left foot       Plan:  Orders Placed This Encounter    Ambulatory consult to Orthopedics    rosuvastatin (CRESTOR) 40 MG Tab     Patient was given a referral to see his orthopedist of choice.  Patient will switch from Vytorin to Crestor at his insurance request.    This note is generated " with speech recognition software and is subject to transcription error and sound alike phrases that may be missed by proofreading.

## 2019-03-01 NOTE — TELEPHONE ENCOUNTER
----- Message from Cici Lester sent at 3/1/2019 11:30 AM CST -----  Contact: [phone]   Pt would like to consult with the nurse about Rx medication, pt would like a Rx for Tramadol 50 mg for pain in right shoulder.  Pt would also like to know the name of the arthritis medication that he is taking.  Please call back at [phone].  [facility]-         Pt Usres:  .  [facility] - [facility] [facility] [facility] [address] [facility] [address] [place]  [address] [address]  Phone: [phone] Fax: [phone]

## 2019-03-01 NOTE — TELEPHONE ENCOUNTER
Called pt he is requesting a prescription for tramadol.  And he would like something for arthritis.  Informed pt MD is out of office and we contact him.  Pt voiced understanding.

## 2019-03-02 RX ORDER — TRAMADOL HYDROCHLORIDE 50 MG/1
TABLET ORAL
Qty: 60 TABLET | Refills: 5 | Status: SHIPPED | OUTPATIENT
Start: 2019-03-02 | End: 2019-04-09 | Stop reason: SDUPTHER

## 2019-03-13 ENCOUNTER — TELEPHONE (OUTPATIENT)
Dept: ORTHOPEDICS | Facility: CLINIC | Age: 64
End: 2019-03-13

## 2019-03-27 ENCOUNTER — OFFICE VISIT (OUTPATIENT)
Dept: INTERNAL MEDICINE | Facility: CLINIC | Age: 64
End: 2019-03-27
Payer: MEDICARE

## 2019-03-27 VITALS
HEIGHT: 73 IN | SYSTOLIC BLOOD PRESSURE: 140 MMHG | RESPIRATION RATE: 17 BRPM | TEMPERATURE: 97 F | HEART RATE: 106 BPM | OXYGEN SATURATION: 98 % | DIASTOLIC BLOOD PRESSURE: 70 MMHG | BODY MASS INDEX: 24.6 KG/M2 | WEIGHT: 185.63 LBS

## 2019-03-27 DIAGNOSIS — E11.22 CKD STAGE 3 DUE TO TYPE 2 DIABETES MELLITUS: ICD-10-CM

## 2019-03-27 DIAGNOSIS — N18.30 TYPE 2 DIABETES MELLITUS WITH STAGE 3 CHRONIC KIDNEY DISEASE, WITHOUT LONG-TERM CURRENT USE OF INSULIN: ICD-10-CM

## 2019-03-27 DIAGNOSIS — E78.5 HYPERLIPIDEMIA ASSOCIATED WITH TYPE 2 DIABETES MELLITUS: ICD-10-CM

## 2019-03-27 DIAGNOSIS — E11.8 DM (DIABETES MELLITUS) WITH COMPLICATIONS: Primary | ICD-10-CM

## 2019-03-27 DIAGNOSIS — E11.42 CONTROLLED TYPE 2 DIABETES MELLITUS WITH DIABETIC POLYNEUROPATHY, WITHOUT LONG-TERM CURRENT USE OF INSULIN: ICD-10-CM

## 2019-03-27 DIAGNOSIS — E11.22 TYPE 2 DIABETES MELLITUS WITH STAGE 3 CHRONIC KIDNEY DISEASE, WITHOUT LONG-TERM CURRENT USE OF INSULIN: ICD-10-CM

## 2019-03-27 DIAGNOSIS — N18.30 CKD STAGE 3 DUE TO TYPE 2 DIABETES MELLITUS: ICD-10-CM

## 2019-03-27 DIAGNOSIS — E11.69 HYPERLIPIDEMIA ASSOCIATED WITH TYPE 2 DIABETES MELLITUS: ICD-10-CM

## 2019-03-27 DIAGNOSIS — E79.0 HYPERURICEMIA: ICD-10-CM

## 2019-03-27 PROCEDURE — 99213 OFFICE O/P EST LOW 20 MIN: CPT | Mod: PBBFAC,PO | Performed by: INTERNAL MEDICINE

## 2019-03-27 PROCEDURE — 99999 PR PBB SHADOW E&M-EST. PATIENT-LVL III: ICD-10-PCS | Mod: PBBFAC,,, | Performed by: INTERNAL MEDICINE

## 2019-03-27 PROCEDURE — 99213 PR OFFICE/OUTPT VISIT, EST, LEVL III, 20-29 MIN: ICD-10-PCS | Mod: S$PBB,,, | Performed by: INTERNAL MEDICINE

## 2019-03-27 PROCEDURE — 99213 OFFICE O/P EST LOW 20 MIN: CPT | Mod: S$PBB,,, | Performed by: INTERNAL MEDICINE

## 2019-03-27 PROCEDURE — 99999 PR PBB SHADOW E&M-EST. PATIENT-LVL III: CPT | Mod: PBBFAC,,, | Performed by: INTERNAL MEDICINE

## 2019-03-28 NOTE — PROGRESS NOTES
"HPI:  Pt is a 62 yo male who comes for f/u of his diabetes, HTN, and lipids. He has not had his lab work done yet. There have been no new problems. He will be seeing ortho in two weeks to decide on TKR. He denies any hypoglycemia.     Current meds have been verified and updated per the EMR  Exam:BP (!) 140/70   Pulse 106   Temp 96.7 °F (35.9 °C) (Tympanic)   Resp 17   Ht 6' 1" (1.854 m)   Wt 84.2 kg (185 lb 10 oz)   SpO2 98%   BMI 24.49 kg/m²   Exam deferred        Impression:  Stable problems below  Patient Active Problem List   Diagnosis    DM (diabetes mellitus) with complications    Type II diabetes mellitus with renal manifestations    Hyperlipidemia associated with type 2 diabetes mellitus    Bipolar 1 disorder    CKD stage 3 due to type 2 diabetes mellitus    Hyperuricemia    Knee effusion, left    Elevated PSA    Controlled diabetes mellitus with diabetic polyneuropathy    Pacemaker    Chronic pain of left knee    Complete tear of medial collateral ligament of right knee    Cervical spondylosis with myelopathy and radiculopathy    Lumbosacral spondylosis with radiculopathy    History of colon polyps    Primary osteoarthritis of both knees    Ulcer of left foot       Plan:  Orders Placed This Encounter    Hemoglobin A1c    Lipid panel    Comprehensive metabolic panel     He will get labs done and we will call him with results. He will see me in six mths. meds the same.     This note is generated with speech recognition software and is subject to transcription error and sound alike phrases that may be missed by proofreading.    "

## 2019-04-08 ENCOUNTER — PATIENT OUTREACH (OUTPATIENT)
Dept: ADMINISTRATIVE | Facility: HOSPITAL | Age: 64
End: 2019-04-08

## 2019-04-09 ENCOUNTER — OFFICE VISIT (OUTPATIENT)
Dept: INTERNAL MEDICINE | Facility: CLINIC | Age: 64
End: 2019-04-09
Payer: MEDICARE

## 2019-04-09 ENCOUNTER — LAB VISIT (OUTPATIENT)
Dept: LAB | Facility: HOSPITAL | Age: 64
End: 2019-04-09
Attending: FAMILY MEDICINE
Payer: MEDICARE

## 2019-04-09 VITALS
HEART RATE: 92 BPM | WEIGHT: 185.63 LBS | BODY MASS INDEX: 24.6 KG/M2 | RESPIRATION RATE: 18 BRPM | DIASTOLIC BLOOD PRESSURE: 71 MMHG | HEIGHT: 73 IN | SYSTOLIC BLOOD PRESSURE: 118 MMHG | TEMPERATURE: 97 F | OXYGEN SATURATION: 96 %

## 2019-04-09 DIAGNOSIS — E11.22 TYPE 2 DIABETES MELLITUS WITH STAGE 3 CHRONIC KIDNEY DISEASE, WITHOUT LONG-TERM CURRENT USE OF INSULIN: ICD-10-CM

## 2019-04-09 DIAGNOSIS — S49.91XA INJURY OF RIGHT SHOULDER, INITIAL ENCOUNTER: Primary | ICD-10-CM

## 2019-04-09 DIAGNOSIS — F31.9 BIPOLAR 1 DISORDER: ICD-10-CM

## 2019-04-09 DIAGNOSIS — G62.9 NEUROPATHY: ICD-10-CM

## 2019-04-09 DIAGNOSIS — N18.30 TYPE 2 DIABETES MELLITUS WITH STAGE 3 CHRONIC KIDNEY DISEASE, WITHOUT LONG-TERM CURRENT USE OF INSULIN: ICD-10-CM

## 2019-04-09 LAB
ESTIMATED AVG GLUCOSE: 263 MG/DL (ref 68–131)
HBA1C MFR BLD HPLC: 10.8 % (ref 4–5.6)

## 2019-04-09 PROCEDURE — 99213 PR OFFICE/OUTPT VISIT, EST, LEVL III, 20-29 MIN: ICD-10-PCS | Mod: S$PBB,,, | Performed by: FAMILY MEDICINE

## 2019-04-09 PROCEDURE — 83036 HEMOGLOBIN GLYCOSYLATED A1C: CPT

## 2019-04-09 PROCEDURE — 99999 PR PBB SHADOW E&M-EST. PATIENT-LVL IV: CPT | Mod: PBBFAC,,, | Performed by: FAMILY MEDICINE

## 2019-04-09 PROCEDURE — 36415 COLL VENOUS BLD VENIPUNCTURE: CPT | Mod: PO

## 2019-04-09 PROCEDURE — 99999 PR PBB SHADOW E&M-EST. PATIENT-LVL IV: ICD-10-PCS | Mod: PBBFAC,,, | Performed by: FAMILY MEDICINE

## 2019-04-09 PROCEDURE — 99214 OFFICE O/P EST MOD 30 MIN: CPT | Mod: PBBFAC,PO | Performed by: FAMILY MEDICINE

## 2019-04-09 PROCEDURE — 99213 OFFICE O/P EST LOW 20 MIN: CPT | Mod: S$PBB,,, | Performed by: FAMILY MEDICINE

## 2019-04-09 RX ORDER — GABAPENTIN 300 MG/1
300 CAPSULE ORAL NIGHTLY
Qty: 30 CAPSULE | Refills: 1 | Status: SHIPPED | OUTPATIENT
Start: 2019-04-09 | End: 2019-06-03 | Stop reason: SDUPTHER

## 2019-04-09 RX ORDER — EZETIMIBE AND SIMVASTATIN 10; 20 MG/1; MG/1
TABLET ORAL
Refills: 11 | COMMUNITY
Start: 2019-03-18 | End: 2019-09-30

## 2019-04-09 RX ORDER — DICLOFENAC SODIUM 10 MG/G
2 GEL TOPICAL 2 TIMES DAILY
Qty: 100 G | Refills: 1 | Status: SHIPPED | OUTPATIENT
Start: 2019-04-09

## 2019-04-09 RX ORDER — TRAMADOL HYDROCHLORIDE 50 MG/1
TABLET ORAL
Qty: 20 TABLET | Refills: 0 | Status: SHIPPED | OUTPATIENT
Start: 2019-04-09 | End: 2019-06-17 | Stop reason: SDUPTHER

## 2019-04-09 RX ORDER — TIZANIDINE 2 MG/1
4 TABLET ORAL EVERY 8 HOURS PRN
Qty: 40 TABLET | Refills: 0 | Status: SHIPPED | OUTPATIENT
Start: 2019-04-09 | End: 2019-04-12 | Stop reason: SDUPTHER

## 2019-04-09 NOTE — PROGRESS NOTES
Subjective:      Patient ID: Hossein Will is a 63 y.o. male.    Chief Complaint: Shoulder Pain      Patient reports about a week ago was getting up into his vehicle after leaving PT, was hanging on with his left arm, leg slipped and he fell into the door, hitting his right shoulder. Since then having pain in right proximal humerus.   He also reports intermittent pain in feet, often bothers him at night when he is trying to sleep.    Review of Systems   Musculoskeletal: Positive for arthralgias and myalgias. Negative for joint swelling.     Past Medical History:   Diagnosis Date    Bipolar 1 disorder     CKD stage 3 due to type 2 diabetes mellitus     Controlled diabetes mellitus with diabetic polyneuropathy     DM (diabetes mellitus) with complications     Elevated PSA     History of colon polyps     Hyperlipidemia associated with type 2 diabetes mellitus     Hypertension     Hyperuricemia     Knee effusion, left     Pacemaker     Type II diabetes mellitus with renal manifestations      Past Surgical History:   Procedure Laterality Date    HERNIA REPAIR      KNEE SURGERY      TRANSURETHRAL RESECTION OF PROSTATE       History reviewed. No pertinent family history.  Social History     Socioeconomic History    Marital status:      Spouse name: Not on file    Number of children: Not on file    Years of education: Not on file    Highest education level: Not on file   Occupational History    Not on file   Social Needs    Financial resource strain: Not on file    Food insecurity:     Worry: Not on file     Inability: Not on file    Transportation needs:     Medical: Not on file     Non-medical: Not on file   Tobacco Use    Smoking status: Never Smoker    Smokeless tobacco: Never Used   Substance and Sexual Activity    Alcohol use: No    Drug use: No    Sexual activity: Never   Lifestyle    Physical activity:     Days per week: Not on file     Minutes per session: Not on file     "Stress: Not on file   Relationships    Social connections:     Talks on phone: Not on file     Gets together: Not on file     Attends Nondenominational service: Not on file     Active member of club or organization: Not on file     Attends meetings of clubs or organizations: Not on file     Relationship status: Not on file   Other Topics Concern    Not on file   Social History Narrative    Not on file     Review of patient's allergies indicates:   Allergen Reactions    Abilify [aripiprazole] Other (See Comments) and Anaphylaxis     Caused coma       Objective:       /71   Pulse 92   Temp 97 °F (36.1 °C)   Resp 18   Ht 6' 1" (1.854 m)   Wt 84.2 kg (185 lb 10 oz)   SpO2 96%   BMI 24.49 kg/m²   Physical Exam   Constitutional: He appears well-developed and well-nourished. No distress.   Musculoskeletal: Normal range of motion. He exhibits tenderness (proximal humerus). He exhibits no edema or deformity.   Skin: He is not diaphoretic.   Vitals reviewed.    Assessment:     1. Injury of right shoulder, initial encounter    2. Neuropathy    3. Bipolar 1 disorder      Plan:   Injury of right shoulder, initial encounter  -     tiZANidine (ZANAFLEX) 2 MG tablet; Take 2 tablets (4 mg total) by mouth every 8 (eight) hours as needed (muscle pain).  Dispense: 40 tablet; Refill: 0  -     diclofenac sodium (VOLTAREN) 1 % Gel; Apply 2 g topically 2 (two) times daily.  Dispense: 100 g; Refill: 1  -     traMADol (ULTRAM) 50 mg tablet; tramadol 50 mg tablet  Dispense: 20 tablet; Refill: 0    Neuropathy  -     gabapentin (NEURONTIN) 300 MG capsule; Take 1 capsule (300 mg total) by mouth every evening.  Dispense: 30 capsule; Refill: 1      Bipolar 1 disorder   This chronic comorbid condition affected decision making today.      Medication List with Changes/Refills   New Medications    DICLOFENAC SODIUM (VOLTAREN) 1 % GEL    Apply 2 g topically 2 (two) times daily.    GABAPENTIN (NEURONTIN) 300 MG CAPSULE    Take 1 capsule (300 mg " "total) by mouth every evening.    TIZANIDINE (ZANAFLEX) 2 MG TABLET    Take 2 tablets (4 mg total) by mouth every 8 (eight) hours as needed (muscle pain).   Current Medications    AFLURIA QUAD 7404-4265, PF, 60 MCG/0.5 ML VACCINE    ADM 0.5ML IM UTD    ASCORBIC ACID, VITAMIN C, (VITAMIN C) 500 MG TABLET    Take 500 mg by mouth.    ASPIRIN (ECOTRIN) 81 MG EC TABLET    Take 81 mg by mouth once daily.    BD INSULIN PEN NEEDLE UF MINI 31 GAUGE X 3/16" NDLE    USE TWICE D    BD ULTRA-FINE VINCE PEN NEEDLES 32 GAUGE X 5/32" NDLE    USE ONCE D WITH INSULIN    CLONAZEPAM (KLONOPIN) 1 MG TABLET    TK 1 T PO  Q HS    DIVALPROEX (DEPAKOTE) 500 MG TBEC    Take 2 tablets by mouth nightly.    EZETIMIBE-SIMVASTATIN 10-20 MG (VYTORIN) 10-20 MG PER TABLET    TK 1 T PO Q NIGHT    FEBUXOSTAT (ULORIC) 40 MG TAB    Take 1 tablet (40 mg total) by mouth once daily.    FREESTYLE LANCETS 28 GAUGE LANCETS    1 each by Other route once daily.    FREESTYLE LITE STRIPS STRP    Take 2 tablets by mouth nightly.    LANTUS SOLOSTAR U-100 INSULIN 100 UNIT/ML (3 ML) INPN PEN    Inject 35 Units into the skin every evening.    LIDOCAINE-PRILOCAINE (EMLA) CREAM        METOPROLOL TARTRATE (LOPRESSOR) 25 MG TABLET    Take 25 mg by mouth daily as needed.    PREDNISOLONE ACETATE (PRED FORTE) 1 % DRPS    INSTILL ONE DROP IN OU BID AS NEEDED    QUETIAPINE (SEROQUEL) 400 MG TABLET    Take 400 mg by mouth nightly.    SITAGLIPTIN (JANUVIA) 100 MG TAB    Take 1 tablet (100 mg total) by mouth once daily.    TAMSULOSIN (FLOMAX) 0.4 MG CP24    TK 1 C PO QD   Changed and/or Refilled Medications    Modified Medication Previous Medication    TRAMADOL (ULTRAM) 50 MG TABLET traMADol (ULTRAM) 50 mg tablet       tramadol 50 mg tablet    tramadol 50 mg tablet   Discontinued Medications    ROSUVASTATIN (CRESTOR) 40 MG TAB    Take 1 tablet (40 mg total) by mouth every evening.     "

## 2019-04-10 ENCOUNTER — TELEPHONE (OUTPATIENT)
Dept: INTERNAL MEDICINE | Facility: CLINIC | Age: 64
End: 2019-04-10

## 2019-04-10 RX ORDER — INSULIN GLARGINE 100 [IU]/ML
60 INJECTION, SOLUTION SUBCUTANEOUS NIGHTLY
Qty: 2 BOX | Refills: 11 | Status: SHIPPED | OUTPATIENT
Start: 2019-04-10 | End: 2019-08-26 | Stop reason: SDUPTHER

## 2019-04-10 NOTE — TELEPHONE ENCOUNTER
----- Message from Seun Chamberlain MD sent at 4/10/2019  6:29 AM CDT -----  Your diabetes test shows your diabetes has become very poorly controlled. You need to follow your diet and take your medications faithfully.

## 2019-04-10 NOTE — TELEPHONE ENCOUNTER
Patient notified of results he verbalized understanding.  Patient states he has been taking 46 units of lantus for pass 3 to 4 months instead of 35 units. Says he may have missed one or two doses this month. Has been faithful with the Januvia.. Call patient back on cell phone.  [phone]

## 2019-04-12 ENCOUNTER — TELEPHONE (OUTPATIENT)
Dept: INTERNAL MEDICINE | Facility: CLINIC | Age: 64
End: 2019-04-12

## 2019-04-12 RX ORDER — TIZANIDINE 2 MG/1
TABLET ORAL
Qty: 40 TABLET | Refills: 0 | Status: SHIPPED | OUTPATIENT
Start: 2019-04-12 | End: 2019-04-21 | Stop reason: SDUPTHER

## 2019-04-12 NOTE — TELEPHONE ENCOUNTER
----- Message from Valentine Caro sent at 4/12/2019  4:08 PM CDT -----  Contact: self [phone]  Pt would like to confirm with nurse, a new diabetic prescrpition will be called to pharmacy, or was he just to have a dosage change.  Please call back at [phone].  Md Suze        Pt uses.  [facility] [address] [place] [address] [address]  [address] AT [facility]  [address] [address]  Phone: [phone] Fax: [phone]

## 2019-04-12 NOTE — TELEPHONE ENCOUNTER
Patient will call pharmacy back and have them go ahead and fill the lantus prescription for the 60 units.

## 2019-04-17 ENCOUNTER — TELEPHONE (OUTPATIENT)
Dept: INTERNAL MEDICINE | Facility: CLINIC | Age: 64
End: 2019-04-17

## 2019-04-17 NOTE — TELEPHONE ENCOUNTER
There is now instructions how to take medications. Is he using it as needed or once at night.       ----- Message from Tapan Shaw sent at 4/17/2019  9:12 AM CDT -----  Contact: pt spouse  Pt is requesting instructions for the use of Tramadol. Pt call back [phone]     Thanks    .  [facility] [address] [place] [address] [address] [facility] [address] AT [facility] [address] [facility]  [address] [address]  Phone: [phone] Fax: [phone]

## 2019-04-17 NOTE — TELEPHONE ENCOUNTER
Unable to leave message patients voice mail is not set up. Please see chart note on instructions from Dr. Donato.

## 2019-04-18 RX ORDER — PEN NEEDLE, DIABETIC 31 GX5/16"
NEEDLE, DISPOSABLE MISCELLANEOUS
Qty: 200 EACH | Refills: 3 | Status: SHIPPED | OUTPATIENT
Start: 2019-04-18 | End: 2019-06-28 | Stop reason: SDUPTHER

## 2019-04-22 RX ORDER — TIZANIDINE 2 MG/1
TABLET ORAL
Qty: 40 TABLET | Refills: 0 | Status: SHIPPED | OUTPATIENT
Start: 2019-04-22 | End: 2019-04-25 | Stop reason: SDUPTHER

## 2019-04-25 RX ORDER — TIZANIDINE 2 MG/1
TABLET ORAL
Qty: 120 TABLET | Refills: 0 | Status: SHIPPED | OUTPATIENT
Start: 2019-04-25 | End: 2019-05-21 | Stop reason: SDUPTHER

## 2019-04-29 RX ORDER — TIZANIDINE 2 MG/1
TABLET ORAL
Qty: 40 TABLET | Refills: 0 | OUTPATIENT
Start: 2019-04-29

## 2019-05-07 ENCOUNTER — TELEPHONE (OUTPATIENT)
Dept: INTERNAL MEDICINE | Facility: CLINIC | Age: 64
End: 2019-05-07

## 2019-05-07 ENCOUNTER — PATIENT OUTREACH (OUTPATIENT)
Dept: ADMINISTRATIVE | Facility: HOSPITAL | Age: 64
End: 2019-05-07

## 2019-05-07 NOTE — PROGRESS NOTES
I have attempted to contact patient to schedule dm eye exam. Patient stated that he had eye exam last month at [facility]. Report requested

## 2019-05-07 NOTE — TELEPHONE ENCOUNTER
Notified patient that I have spoken with Darlene pharmacist and she is updating prescription so he can pick it up.

## 2019-05-07 NOTE — TELEPHONE ENCOUNTER
----- Message from Katelin Reyna MA sent at 5/7/2019 11:35 AM CDT -----  Patient requesting a call back concerning his medication refill

## 2019-05-10 ENCOUNTER — PATIENT OUTREACH (OUTPATIENT)
Dept: ADMINISTRATIVE | Facility: HOSPITAL | Age: 64
End: 2019-05-10

## 2019-05-10 DIAGNOSIS — E11.8 DM (DIABETES MELLITUS) WITH COMPLICATIONS: Primary | ICD-10-CM

## 2019-05-21 RX ORDER — TIZANIDINE 2 MG/1
TABLET ORAL
Qty: 120 TABLET | Refills: 0 | Status: SHIPPED | OUTPATIENT
Start: 2019-05-21 | End: 2020-06-16 | Stop reason: SDUPTHER

## 2019-05-22 RX ORDER — FEBUXOSTAT 40 MG/1
TABLET ORAL
Qty: 30 TABLET | Refills: 11 | Status: SHIPPED | OUTPATIENT
Start: 2019-05-22 | End: 2020-04-29

## 2019-05-27 NOTE — PROGRESS NOTES
"HPI:  Patient comes in today with complaints of bilateral fullness in both ears.  He denies any pain.    Current meds have been verified and updated per the EMR  Exam:BP (!) 169/71   Pulse 80   Temp 97.6 °F (36.4 °C)   Resp 16   Ht 6' 1" (1.854 m)   Wt 88.4 kg (194 lb 14.2 oz)   SpO2 98%   BMI 25.71 kg/m²   Both external auditory canals are impacted with cerumen    Lab Results   Component Value Date    WBC 8.80 06/06/2018    HGB 10.0 (L) 06/06/2018    HCT 30.8 (L) 06/06/2018     (H) 06/06/2018     (H) 06/06/2018    CHOL 121 08/31/2018    TRIG 73 08/31/2018    HDL 42 08/31/2018    ALT 7 (L) 08/31/2018    AST 18 08/31/2018     08/31/2018    K 5.1 08/31/2018     08/31/2018    CREATININE 2.1 (H) 08/31/2018    BUN 43 (H) 08/31/2018    CO2 26 08/31/2018    TSH 1.346 03/05/2018    PSA 5.8 (H) 03/05/2018    HGBA1C 6.9 (H) 08/31/2018       Impression:  Cerumen impaction  Patient Active Problem List   Diagnosis    DM (diabetes mellitus) with complications    Type II diabetes mellitus with renal manifestations    Hyperlipidemia associated with type 2 diabetes mellitus    Bipolar 1 disorder    CKD stage 3 due to type 2 diabetes mellitus    Hyperuricemia    Knee effusion, left    Elevated PSA    Controlled diabetes mellitus with diabetic polyneuropathy    Pacemaker    Chronic pain of left knee    Complete tear of medial collateral ligament of right knee    Cervical spondylosis with myelopathy and radiculopathy    Lumbosacral spondylosis with radiculopathy    History of colon polyps    Primary osteoarthritis of both knees    Ulcer of left foot       Plan:     Patient was instructed how to clean out the ears using over-the-counter ear wax removal drops    " 5

## 2019-05-30 ENCOUNTER — TELEPHONE (OUTPATIENT)
Dept: INTERNAL MEDICINE | Facility: CLINIC | Age: 64
End: 2019-05-30

## 2019-05-30 NOTE — TELEPHONE ENCOUNTER
----- Message from Rosita Muse sent at 5/30/2019 10:14 AM CDT -----  Contact: [facility]  Caller is calling in regards to request lab orders for pt.      Pls call caller back [phone]

## 2019-06-03 RX ORDER — GABAPENTIN 300 MG/1
CAPSULE ORAL
Qty: 30 CAPSULE | Refills: 5 | Status: SHIPPED | OUTPATIENT
Start: 2019-06-03 | End: 2019-12-05 | Stop reason: SDUPTHER

## 2019-06-10 RX ORDER — TIZANIDINE 2 MG/1
TABLET ORAL
Qty: 120 TABLET | Refills: 0 | OUTPATIENT
Start: 2019-06-10

## 2019-06-15 RX ORDER — SITAGLIPTIN 100 MG/1
TABLET, FILM COATED ORAL
Qty: 90 TABLET | Refills: 3 | Status: SHIPPED | OUTPATIENT
Start: 2019-06-15 | End: 2020-02-20 | Stop reason: SDUPTHER

## 2019-06-17 ENCOUNTER — OFFICE VISIT (OUTPATIENT)
Dept: INTERNAL MEDICINE | Facility: CLINIC | Age: 64
End: 2019-06-17
Payer: MEDICARE

## 2019-06-17 VITALS
HEART RATE: 91 BPM | WEIGHT: 190.5 LBS | DIASTOLIC BLOOD PRESSURE: 68 MMHG | OXYGEN SATURATION: 99 % | SYSTOLIC BLOOD PRESSURE: 116 MMHG | BODY MASS INDEX: 25.13 KG/M2 | TEMPERATURE: 97 F

## 2019-06-17 DIAGNOSIS — R21 RASH: Primary | ICD-10-CM

## 2019-06-17 PROCEDURE — 99214 OFFICE O/P EST MOD 30 MIN: CPT | Mod: PBBFAC,PO | Performed by: INTERNAL MEDICINE

## 2019-06-17 PROCEDURE — 99999 PR PBB SHADOW E&M-EST. PATIENT-LVL IV: CPT | Mod: PBBFAC,,, | Performed by: INTERNAL MEDICINE

## 2019-06-17 PROCEDURE — 99213 PR OFFICE/OUTPT VISIT, EST, LEVL III, 20-29 MIN: ICD-10-PCS | Mod: S$PBB,,, | Performed by: INTERNAL MEDICINE

## 2019-06-17 PROCEDURE — 99999 PR PBB SHADOW E&M-EST. PATIENT-LVL IV: ICD-10-PCS | Mod: PBBFAC,,, | Performed by: INTERNAL MEDICINE

## 2019-06-17 PROCEDURE — 99213 OFFICE O/P EST LOW 20 MIN: CPT | Mod: S$PBB,,, | Performed by: INTERNAL MEDICINE

## 2019-06-17 RX ORDER — TRAMADOL HYDROCHLORIDE 50 MG/1
TABLET ORAL
Qty: 30 TABLET | Refills: 5 | Status: SHIPPED | OUTPATIENT
Start: 2019-06-17 | End: 2020-06-16 | Stop reason: SDUPTHER

## 2019-06-17 RX ORDER — HYDROCORTISONE 25 MG/G
CREAM TOPICAL 2 TIMES DAILY
Qty: 28 G | Refills: 1 | Status: SHIPPED | OUTPATIENT
Start: 2019-06-17 | End: 2021-10-21

## 2019-06-17 NOTE — PROGRESS NOTES
HPI:  Patient is a 63-year-old man who comes today with complaints of a rash behind his right ear.  Is been present off and on for several weeks.  He denies any other associated problems with it.    Current meds have been verified and updated per the EMR  Exam:/68   Pulse 91   Temp 97.4 °F (36.3 °C)   Wt 86.4 kg (190 lb 7.6 oz)   SpO2 99%   BMI 25.13 kg/m²     He has scaly areas skin skin was some cracks and fissures behind his right ear.      Impression:  Seborrheic dermatitis  Patient Active Problem List   Diagnosis    DM (diabetes mellitus) with complications    Type II diabetes mellitus with renal manifestations    Hyperlipidemia associated with type 2 diabetes mellitus    Bipolar 1 disorder    CKD stage 3 due to type 2 diabetes mellitus    Hyperuricemia    Knee effusion, left    Elevated PSA    Controlled diabetes mellitus with diabetic polyneuropathy    Pacemaker    Chronic pain of left knee    Complete tear of medial collateral ligament of right knee    Cervical spondylosis with myelopathy and radiculopathy    Lumbosacral spondylosis with radiculopathy    History of colon polyps    Primary osteoarthritis of both knees    Ulcer of left foot       Plan:  Orders Placed This Encounter    hydrocortisone 2.5 % cream    traMADol (ULTRAM) 50 mg tablet     He was given hydrocodone cream to apply twice a day.  The does not clear he needs to let me know.    This note is generated with speech recognition software and is subject to transcription error and sound alike phrases that may be missed by proofreading.

## 2019-06-21 ENCOUNTER — LAB VISIT (OUTPATIENT)
Dept: LAB | Facility: HOSPITAL | Age: 64
End: 2019-06-21
Attending: INTERNAL MEDICINE
Payer: MEDICARE

## 2019-06-21 DIAGNOSIS — E11.22 TYPE 2 DIABETES MELLITUS WITH STAGE 3 CHRONIC KIDNEY DISEASE, WITHOUT LONG-TERM CURRENT USE OF INSULIN: ICD-10-CM

## 2019-06-21 DIAGNOSIS — E11.8 DM (DIABETES MELLITUS) WITH COMPLICATIONS: ICD-10-CM

## 2019-06-21 DIAGNOSIS — N18.30 TYPE 2 DIABETES MELLITUS WITH STAGE 3 CHRONIC KIDNEY DISEASE, WITHOUT LONG-TERM CURRENT USE OF INSULIN: ICD-10-CM

## 2019-06-21 LAB
BASOPHILS # BLD AUTO: 0.06 K/UL (ref 0–0.2)
BASOPHILS NFR BLD: 0.6 % (ref 0–1.9)
DIFFERENTIAL METHOD: ABNORMAL
EOSINOPHIL # BLD AUTO: 0.6 K/UL (ref 0–0.5)
EOSINOPHIL NFR BLD: 6.2 % (ref 0–8)
ERYTHROCYTE [DISTWIDTH] IN BLOOD BY AUTOMATED COUNT: 16.2 % (ref 11.5–14.5)
HCT VFR BLD AUTO: 34.6 % (ref 40–54)
HGB BLD-MCNC: 11.1 G/DL (ref 14–18)
IMM GRANULOCYTES # BLD AUTO: 0.04 K/UL (ref 0–0.04)
IMM GRANULOCYTES NFR BLD AUTO: 0.4 % (ref 0–0.5)
LYMPHOCYTES # BLD AUTO: 5.7 K/UL (ref 1–4.8)
LYMPHOCYTES NFR BLD: 55.4 % (ref 18–48)
MCH RBC QN AUTO: 27.8 PG (ref 27–31)
MCHC RBC AUTO-ENTMCNC: 32.1 G/DL (ref 32–36)
MCV RBC AUTO: 87 FL (ref 82–98)
MONOCYTES # BLD AUTO: 1 K/UL (ref 0.3–1)
MONOCYTES NFR BLD: 10.1 % (ref 4–15)
NEUTROPHILS # BLD AUTO: 2.8 K/UL (ref 1.8–7.7)
NEUTROPHILS NFR BLD: 27.3 % (ref 38–73)
NRBC BLD-RTO: 0 /100 WBC
PLATELET # BLD AUTO: 172 K/UL (ref 150–350)
PMV BLD AUTO: 12.5 FL (ref 9.2–12.9)
RBC # BLD AUTO: 4 M/UL (ref 4.6–6.2)
WBC # BLD AUTO: 10.28 K/UL (ref 3.9–12.7)

## 2019-06-21 PROCEDURE — 36415 COLL VENOUS BLD VENIPUNCTURE: CPT | Mod: PO

## 2019-06-21 PROCEDURE — 80061 LIPID PANEL: CPT

## 2019-06-21 PROCEDURE — 84443 ASSAY THYROID STIM HORMONE: CPT

## 2019-06-21 PROCEDURE — 80053 COMPREHEN METABOLIC PANEL: CPT

## 2019-06-21 PROCEDURE — 82043 UR ALBUMIN QUANTITATIVE: CPT

## 2019-06-21 PROCEDURE — 85025 COMPLETE CBC W/AUTO DIFF WBC: CPT

## 2019-06-22 LAB
ALBUMIN SERPL BCP-MCNC: 4.1 G/DL (ref 3.5–5.2)
ALBUMIN/CREAT UR: 228.7 UG/MG (ref 0–30)
ALP SERPL-CCNC: 97 U/L (ref 55–135)
ALT SERPL W/O P-5'-P-CCNC: 13 U/L (ref 10–44)
ANION GAP SERPL CALC-SCNC: 12 MMOL/L (ref 8–16)
AST SERPL-CCNC: 23 U/L (ref 10–40)
BILIRUB SERPL-MCNC: 0.5 MG/DL (ref 0.1–1)
BUN SERPL-MCNC: 42 MG/DL (ref 8–23)
CALCIUM SERPL-MCNC: 10.2 MG/DL (ref 8.7–10.5)
CHLORIDE SERPL-SCNC: 101 MMOL/L (ref 95–110)
CHOLEST SERPL-MCNC: 113 MG/DL (ref 120–199)
CHOLEST/HDLC SERPL: 2.6 {RATIO} (ref 2–5)
CO2 SERPL-SCNC: 25 MMOL/L (ref 23–29)
CREAT SERPL-MCNC: 3.1 MG/DL (ref 0.5–1.4)
CREAT UR-MCNC: 101 MG/DL (ref 23–375)
EST. GFR  (AFRICAN AMERICAN): 23.1 ML/MIN/1.73 M^2
EST. GFR  (NON AFRICAN AMERICAN): 20 ML/MIN/1.73 M^2
GLUCOSE SERPL-MCNC: 131 MG/DL (ref 70–110)
HDLC SERPL-MCNC: 44 MG/DL (ref 40–75)
HDLC SERPL: 38.9 % (ref 20–50)
LDLC SERPL CALC-MCNC: 46.6 MG/DL (ref 63–159)
MICROALBUMIN UR DL<=1MG/L-MCNC: 231 UG/ML
NONHDLC SERPL-MCNC: 69 MG/DL
POTASSIUM SERPL-SCNC: 4.5 MMOL/L (ref 3.5–5.1)
PROT SERPL-MCNC: 7.5 G/DL (ref 6–8.4)
SODIUM SERPL-SCNC: 138 MMOL/L (ref 136–145)
TRIGL SERPL-MCNC: 112 MG/DL (ref 30–150)
TSH SERPL DL<=0.005 MIU/L-ACNC: 1.41 UIU/ML (ref 0.4–4)

## 2019-06-28 ENCOUNTER — OFFICE VISIT (OUTPATIENT)
Dept: INTERNAL MEDICINE | Facility: CLINIC | Age: 64
End: 2019-06-28
Payer: MEDICARE

## 2019-06-28 VITALS
WEIGHT: 191.81 LBS | BODY MASS INDEX: 25.42 KG/M2 | TEMPERATURE: 98 F | DIASTOLIC BLOOD PRESSURE: 80 MMHG | SYSTOLIC BLOOD PRESSURE: 140 MMHG | OXYGEN SATURATION: 98 % | HEIGHT: 73 IN | HEART RATE: 94 BPM

## 2019-06-28 DIAGNOSIS — E11.22 TYPE 2 DIABETES MELLITUS WITH STAGE 3 CHRONIC KIDNEY DISEASE, WITHOUT LONG-TERM CURRENT USE OF INSULIN: ICD-10-CM

## 2019-06-28 DIAGNOSIS — E11.69 HYPERLIPIDEMIA ASSOCIATED WITH TYPE 2 DIABETES MELLITUS: ICD-10-CM

## 2019-06-28 DIAGNOSIS — E11.8 DM (DIABETES MELLITUS) WITH COMPLICATIONS: ICD-10-CM

## 2019-06-28 DIAGNOSIS — E11.42 CONTROLLED TYPE 2 DIABETES MELLITUS WITH DIABETIC POLYNEUROPATHY, WITHOUT LONG-TERM CURRENT USE OF INSULIN: Primary | ICD-10-CM

## 2019-06-28 DIAGNOSIS — N18.30 TYPE 2 DIABETES MELLITUS WITH STAGE 3 CHRONIC KIDNEY DISEASE, WITHOUT LONG-TERM CURRENT USE OF INSULIN: ICD-10-CM

## 2019-06-28 DIAGNOSIS — N18.30 CKD STAGE 3 DUE TO TYPE 2 DIABETES MELLITUS: ICD-10-CM

## 2019-06-28 DIAGNOSIS — E79.0 HYPERURICEMIA: ICD-10-CM

## 2019-06-28 DIAGNOSIS — R97.20 ELEVATED PSA: ICD-10-CM

## 2019-06-28 DIAGNOSIS — E11.22 CKD STAGE 3 DUE TO TYPE 2 DIABETES MELLITUS: ICD-10-CM

## 2019-06-28 DIAGNOSIS — E78.5 HYPERLIPIDEMIA ASSOCIATED WITH TYPE 2 DIABETES MELLITUS: ICD-10-CM

## 2019-06-28 PROCEDURE — 99213 OFFICE O/P EST LOW 20 MIN: CPT | Mod: PBBFAC,PO | Performed by: INTERNAL MEDICINE

## 2019-06-28 PROCEDURE — 99999 PR PBB SHADOW E&M-EST. PATIENT-LVL III: ICD-10-PCS | Mod: PBBFAC,,, | Performed by: INTERNAL MEDICINE

## 2019-06-28 PROCEDURE — 99214 OFFICE O/P EST MOD 30 MIN: CPT | Mod: S$PBB,,, | Performed by: INTERNAL MEDICINE

## 2019-06-28 PROCEDURE — 99999 PR PBB SHADOW E&M-EST. PATIENT-LVL III: CPT | Mod: PBBFAC,,, | Performed by: INTERNAL MEDICINE

## 2019-06-28 PROCEDURE — 99214 PR OFFICE/OUTPT VISIT, EST, LEVL IV, 30-39 MIN: ICD-10-PCS | Mod: S$PBB,,, | Performed by: INTERNAL MEDICINE

## 2019-06-28 RX ORDER — PEN NEEDLE, DIABETIC 30 GX3/16"
NEEDLE, DISPOSABLE MISCELLANEOUS
Qty: 200 EACH | Refills: 3 | Status: SHIPPED | OUTPATIENT
Start: 2019-06-28 | End: 2020-06-28

## 2019-06-28 RX ORDER — INSULIN ASPART 100 [IU]/ML
10 INJECTION, SOLUTION INTRAVENOUS; SUBCUTANEOUS
Qty: 1 BOX | Refills: 11 | Status: SHIPPED | OUTPATIENT
Start: 2019-06-28 | End: 2019-09-30 | Stop reason: SDUPTHER

## 2019-06-28 NOTE — PROGRESS NOTES
"HPI:  Patient is a 63-year-old man who comes today for follow-up of his diabetes, hypertension and lipids.  Patient states sugars are in the 100-130 range.  He denies any hypoglycemic events.  He has no other complaints.    Current meds have been verified and updated per the EMR  Exam:BP (!) 140/80   Pulse 94   Temp 97.9 °F (36.6 °C) (Tympanic)   Ht 6' 1" (1.854 m)   Wt 87 kg (191 lb 12.8 oz)   SpO2 98%   BMI 25.30 kg/m²   Exam deferred    Lab Results   Component Value Date    WBC 10.28 06/21/2019    HGB 11.1 (L) 06/21/2019    HCT 34.6 (L) 06/21/2019     06/21/2019    CHOL 113 (L) 06/21/2019    TRIG 112 06/21/2019    HDL 44 06/21/2019    ALT 13 06/21/2019    AST 23 06/21/2019     06/21/2019    K 4.5 06/21/2019     06/21/2019    CREATININE 3.1 (H) 06/21/2019    BUN 42 (H) 06/21/2019    CO2 25 06/21/2019    TSH 1.413 06/21/2019    PSA 5.8 (H) 03/05/2018    HGBA1C 10.8 (H) 04/09/2019       Impression:  Diabetes, extremely poorly controlled based on his hemoglobin A1c.  I seriously doubt that he is checking his sugars and they are between 100-130.  Chronic kidney disease, his creatinine has gone up significantly.  It had been in the 2.1 range and now it is 3.1.  His urine protein creatinine ratio is markedly elevated as well.  He has not seen his nephrologist in a couple of months.  Patient Active Problem List   Diagnosis    DM (diabetes mellitus) with complications    Type II diabetes mellitus with renal manifestations    Hyperlipidemia associated with type 2 diabetes mellitus    Bipolar 1 disorder    CKD stage 3 due to type 2 diabetes mellitus    Hyperuricemia    Knee effusion, left    Elevated PSA    Controlled diabetes mellitus with diabetic polyneuropathy    Pacemaker    Chronic pain of left knee    Complete tear of medial collateral ligament of right knee    Cervical spondylosis with myelopathy and radiculopathy    Lumbosacral spondylosis with radiculopathy    History of " "colon polyps    Primary osteoarthritis of both knees    Ulcer of left foot       Plan:  Orders Placed This Encounter    Basic metabolic panel    Hemoglobin A1c    insulin aspart U-100 (NOVOLOG FLEXPEN U-100 INSULIN) 100 unit/mL (3 mL) InPn pen    pen needle, diabetic (BD ULTRA-FINE VINCE PEN NEEDLE) 32 gauge x 5/32" Ndle     He will be started on NovoLog 10 units with every meal.  He will have repeat lab work in 3 months.  I told him he needs to see his nephrologist in the next couple weeks.    This note is generated with speech recognition software and is subject to transcription error and sound alike phrases that may be missed by proofreading.    "

## 2019-07-15 LAB — A1C: 10.4

## 2019-07-26 ENCOUNTER — PATIENT OUTREACH (OUTPATIENT)
Dept: ADMINISTRATIVE | Facility: HOSPITAL | Age: 64
End: 2019-07-26

## 2019-07-26 ENCOUNTER — TELEPHONE (OUTPATIENT)
Dept: INTERNAL MEDICINE | Facility: CLINIC | Age: 64
End: 2019-07-26

## 2019-07-26 NOTE — TELEPHONE ENCOUNTER
----- Message from Irina Finch sent at 7/26/2019  9:08 AM CDT -----  Contact: self [phone]  Type:  Same Day Appointment Request    Caller is requesting a same day appointment.  Caller declined first available appointment listed below.    Name of Caller:Hossein Will  When is the first available appointment? 07/29/19  Symptoms: back/shoulder pain  Best Call Back Number:[phone]  Additional Information: states that he can be called back after 12pm.

## 2019-07-29 ENCOUNTER — DOCUMENTATION ONLY (OUTPATIENT)
Dept: ADMINISTRATIVE | Facility: HOSPITAL | Age: 64
End: 2019-07-29

## 2019-08-23 ENCOUNTER — OFFICE VISIT (OUTPATIENT)
Dept: INTERNAL MEDICINE | Facility: CLINIC | Age: 64
End: 2019-08-23
Payer: MEDICARE

## 2019-08-23 VITALS
OXYGEN SATURATION: 98 % | TEMPERATURE: 97 F | HEART RATE: 98 BPM | BODY MASS INDEX: 25.45 KG/M2 | SYSTOLIC BLOOD PRESSURE: 118 MMHG | DIASTOLIC BLOOD PRESSURE: 62 MMHG | WEIGHT: 192 LBS | HEIGHT: 73 IN

## 2019-08-23 DIAGNOSIS — R30.0 DYSURIA: Primary | ICD-10-CM

## 2019-08-23 DIAGNOSIS — E11.42 CONTROLLED TYPE 2 DIABETES MELLITUS WITH DIABETIC POLYNEUROPATHY, WITHOUT LONG-TERM CURRENT USE OF INSULIN: ICD-10-CM

## 2019-08-23 PROCEDURE — 99999 PR PBB SHADOW E&M-EST. PATIENT-LVL IV: ICD-10-PCS | Mod: PBBFAC,,, | Performed by: INTERNAL MEDICINE

## 2019-08-23 PROCEDURE — 99999 PR PBB SHADOW E&M-EST. PATIENT-LVL IV: CPT | Mod: PBBFAC,,, | Performed by: INTERNAL MEDICINE

## 2019-08-23 PROCEDURE — 99499 UNLISTED E&M SERVICE: CPT | Mod: S$PBB,,, | Performed by: INTERNAL MEDICINE

## 2019-08-23 PROCEDURE — 99214 OFFICE O/P EST MOD 30 MIN: CPT | Mod: PBBFAC,PO | Performed by: INTERNAL MEDICINE

## 2019-08-23 PROCEDURE — 99499 NO LOS: ICD-10-PCS | Mod: S$PBB,,, | Performed by: INTERNAL MEDICINE

## 2019-08-26 ENCOUNTER — TELEPHONE (OUTPATIENT)
Dept: INTERNAL MEDICINE | Facility: CLINIC | Age: 64
End: 2019-08-26
Payer: MEDICARE

## 2019-08-26 ENCOUNTER — OFFICE VISIT (OUTPATIENT)
Dept: INTERNAL MEDICINE | Facility: CLINIC | Age: 64
End: 2019-08-26
Payer: MEDICARE

## 2019-08-26 VITALS
TEMPERATURE: 97 F | HEIGHT: 73 IN | WEIGHT: 189.63 LBS | BODY MASS INDEX: 25.13 KG/M2 | SYSTOLIC BLOOD PRESSURE: 118 MMHG | OXYGEN SATURATION: 96 % | DIASTOLIC BLOOD PRESSURE: 76 MMHG | HEART RATE: 92 BPM

## 2019-08-26 DIAGNOSIS — N18.30 TYPE 2 DIABETES MELLITUS WITH STAGE 3 CHRONIC KIDNEY DISEASE, WITHOUT LONG-TERM CURRENT USE OF INSULIN: ICD-10-CM

## 2019-08-26 DIAGNOSIS — E11.69 HYPERLIPIDEMIA ASSOCIATED WITH TYPE 2 DIABETES MELLITUS: ICD-10-CM

## 2019-08-26 DIAGNOSIS — E11.22 TYPE 2 DIABETES MELLITUS WITH STAGE 3 CHRONIC KIDNEY DISEASE, WITHOUT LONG-TERM CURRENT USE OF INSULIN: ICD-10-CM

## 2019-08-26 DIAGNOSIS — E11.42 CONTROLLED TYPE 2 DIABETES MELLITUS WITH DIABETIC POLYNEUROPATHY, WITHOUT LONG-TERM CURRENT USE OF INSULIN: ICD-10-CM

## 2019-08-26 DIAGNOSIS — R30.0 DYSURIA: Primary | ICD-10-CM

## 2019-08-26 DIAGNOSIS — E11.8 DM (DIABETES MELLITUS) WITH COMPLICATIONS: Primary | ICD-10-CM

## 2019-08-26 DIAGNOSIS — E78.5 HYPERLIPIDEMIA ASSOCIATED WITH TYPE 2 DIABETES MELLITUS: ICD-10-CM

## 2019-08-26 LAB
BILIRUB SERPL-MCNC: NEGATIVE MG/DL
BLOOD URINE, POC: 50
COLOR, POC UA: ABNORMAL
GLUCOSE UR QL STRIP: 1000
KETONES UR QL STRIP: NEGATIVE
LEUKOCYTE ESTERASE URINE, POC: NEGATIVE
NITRITE, POC UA: NEGATIVE
PH, POC UA: 5
PROTEIN, POC: 30
SPECIFIC GRAVITY, POC UA: 1.01
UROBILINOGEN, POC UA: NORMAL

## 2019-08-26 PROCEDURE — 99213 OFFICE O/P EST LOW 20 MIN: CPT | Mod: PBBFAC,PO | Performed by: INTERNAL MEDICINE

## 2019-08-26 PROCEDURE — 99999 PR PBB SHADOW E&M-EST. PATIENT-LVL III: ICD-10-PCS | Mod: PBBFAC,,, | Performed by: INTERNAL MEDICINE

## 2019-08-26 PROCEDURE — 99999 PR PBB SHADOW E&M-EST. PATIENT-LVL III: CPT | Mod: PBBFAC,,, | Performed by: INTERNAL MEDICINE

## 2019-08-26 PROCEDURE — 99213 OFFICE O/P EST LOW 20 MIN: CPT | Mod: S$PBB,,, | Performed by: INTERNAL MEDICINE

## 2019-08-26 PROCEDURE — 99213 PR OFFICE/OUTPT VISIT, EST, LEVL III, 20-29 MIN: ICD-10-PCS | Mod: S$PBB,,, | Performed by: INTERNAL MEDICINE

## 2019-08-26 RX ORDER — INSULIN GLARGINE 100 [IU]/ML
40 INJECTION, SOLUTION SUBCUTANEOUS NIGHTLY
Qty: 2 BOX | Refills: 11 | Status: SHIPPED | OUTPATIENT
Start: 2019-08-26 | End: 2019-09-30 | Stop reason: SDUPTHER

## 2019-09-20 DIAGNOSIS — E11.8 DM (DIABETES MELLITUS) WITH COMPLICATIONS: ICD-10-CM

## 2019-09-23 ENCOUNTER — LAB VISIT (OUTPATIENT)
Dept: LAB | Facility: HOSPITAL | Age: 64
End: 2019-09-23
Attending: INTERNAL MEDICINE
Payer: MEDICARE

## 2019-09-23 DIAGNOSIS — N18.30 TYPE 2 DIABETES MELLITUS WITH STAGE 3 CHRONIC KIDNEY DISEASE, WITHOUT LONG-TERM CURRENT USE OF INSULIN: ICD-10-CM

## 2019-09-23 DIAGNOSIS — E11.22 TYPE 2 DIABETES MELLITUS WITH STAGE 3 CHRONIC KIDNEY DISEASE, WITHOUT LONG-TERM CURRENT USE OF INSULIN: ICD-10-CM

## 2019-09-23 LAB
ANION GAP SERPL CALC-SCNC: 10 MMOL/L (ref 8–16)
BUN SERPL-MCNC: 37 MG/DL (ref 8–23)
CALCIUM SERPL-MCNC: 10.1 MG/DL (ref 8.7–10.5)
CHLORIDE SERPL-SCNC: 108 MMOL/L (ref 95–110)
CO2 SERPL-SCNC: 25 MMOL/L (ref 23–29)
CREAT SERPL-MCNC: 2.4 MG/DL (ref 0.5–1.4)
EST. GFR  (AFRICAN AMERICAN): 31.5 ML/MIN/1.73 M^2
EST. GFR  (NON AFRICAN AMERICAN): 27.3 ML/MIN/1.73 M^2
ESTIMATED AVG GLUCOSE: 214 MG/DL (ref 68–131)
GLUCOSE SERPL-MCNC: 94 MG/DL (ref 70–110)
HBA1C MFR BLD HPLC: 9.1 % (ref 4–5.6)
POTASSIUM SERPL-SCNC: 4.7 MMOL/L (ref 3.5–5.1)
SODIUM SERPL-SCNC: 143 MMOL/L (ref 136–145)

## 2019-09-23 PROCEDURE — 83036 HEMOGLOBIN GLYCOSYLATED A1C: CPT

## 2019-09-23 PROCEDURE — 36415 COLL VENOUS BLD VENIPUNCTURE: CPT | Mod: PO

## 2019-09-23 PROCEDURE — 80048 BASIC METABOLIC PNL TOTAL CA: CPT

## 2019-09-30 ENCOUNTER — OFFICE VISIT (OUTPATIENT)
Dept: INTERNAL MEDICINE | Facility: CLINIC | Age: 64
End: 2019-09-30
Payer: MEDICARE

## 2019-09-30 VITALS
HEART RATE: 69 BPM | HEIGHT: 73 IN | SYSTOLIC BLOOD PRESSURE: 126 MMHG | WEIGHT: 188.5 LBS | BODY MASS INDEX: 24.98 KG/M2 | DIASTOLIC BLOOD PRESSURE: 72 MMHG | TEMPERATURE: 97 F

## 2019-09-30 DIAGNOSIS — E11.8 DM (DIABETES MELLITUS) WITH COMPLICATIONS: ICD-10-CM

## 2019-09-30 DIAGNOSIS — Z95.0 PACEMAKER: ICD-10-CM

## 2019-09-30 DIAGNOSIS — E79.0 HYPERURICEMIA: ICD-10-CM

## 2019-09-30 DIAGNOSIS — I12.9 HYPERTENSION ASSOCIATED WITH STAGE 3 CHRONIC KIDNEY DISEASE DUE TO TYPE 2 DIABETES MELLITUS: ICD-10-CM

## 2019-09-30 DIAGNOSIS — Z86.010 HISTORY OF COLON POLYPS: ICD-10-CM

## 2019-09-30 DIAGNOSIS — N18.30 CKD STAGE 3 DUE TO TYPE 2 DIABETES MELLITUS: ICD-10-CM

## 2019-09-30 DIAGNOSIS — E11.42 CONTROLLED TYPE 2 DIABETES MELLITUS WITH DIABETIC POLYNEUROPATHY, WITHOUT LONG-TERM CURRENT USE OF INSULIN: Primary | ICD-10-CM

## 2019-09-30 DIAGNOSIS — E11.22 TYPE 2 DIABETES MELLITUS WITH STAGE 3 CHRONIC KIDNEY DISEASE, WITHOUT LONG-TERM CURRENT USE OF INSULIN: ICD-10-CM

## 2019-09-30 DIAGNOSIS — E78.5 HYPERLIPIDEMIA ASSOCIATED WITH TYPE 2 DIABETES MELLITUS: ICD-10-CM

## 2019-09-30 DIAGNOSIS — N18.30 HYPERTENSION ASSOCIATED WITH STAGE 3 CHRONIC KIDNEY DISEASE DUE TO TYPE 2 DIABETES MELLITUS: ICD-10-CM

## 2019-09-30 DIAGNOSIS — E11.69 HYPERLIPIDEMIA ASSOCIATED WITH TYPE 2 DIABETES MELLITUS: ICD-10-CM

## 2019-09-30 DIAGNOSIS — N18.30 TYPE 2 DIABETES MELLITUS WITH STAGE 3 CHRONIC KIDNEY DISEASE, WITHOUT LONG-TERM CURRENT USE OF INSULIN: ICD-10-CM

## 2019-09-30 DIAGNOSIS — F31.9 BIPOLAR 1 DISORDER: ICD-10-CM

## 2019-09-30 DIAGNOSIS — E11.22 CKD STAGE 3 DUE TO TYPE 2 DIABETES MELLITUS: ICD-10-CM

## 2019-09-30 DIAGNOSIS — E11.22 HYPERTENSION ASSOCIATED WITH STAGE 3 CHRONIC KIDNEY DISEASE DUE TO TYPE 2 DIABETES MELLITUS: ICD-10-CM

## 2019-09-30 DIAGNOSIS — R97.20 ELEVATED PSA: ICD-10-CM

## 2019-09-30 PROCEDURE — 99214 OFFICE O/P EST MOD 30 MIN: CPT | Mod: 25,S$PBB,, | Performed by: INTERNAL MEDICINE

## 2019-09-30 PROCEDURE — 99999 PR PBB SHADOW E&M-EST. PATIENT-LVL III: ICD-10-PCS | Mod: PBBFAC,,, | Performed by: INTERNAL MEDICINE

## 2019-09-30 PROCEDURE — 90662 IIV NO PRSV INCREASED AG IM: CPT | Mod: PBBFAC,PO

## 2019-09-30 PROCEDURE — 99213 OFFICE O/P EST LOW 20 MIN: CPT | Mod: PBBFAC,PO,25 | Performed by: INTERNAL MEDICINE

## 2019-09-30 PROCEDURE — 99214 PR OFFICE/OUTPT VISIT, EST, LEVL IV, 30-39 MIN: ICD-10-PCS | Mod: 25,S$PBB,, | Performed by: INTERNAL MEDICINE

## 2019-09-30 PROCEDURE — 99999 PR PBB SHADOW E&M-EST. PATIENT-LVL III: CPT | Mod: PBBFAC,,, | Performed by: INTERNAL MEDICINE

## 2019-09-30 PROCEDURE — G0009 ADMIN PNEUMOCOCCAL VACCINE: HCPCS | Mod: PBBFAC,PO

## 2019-09-30 RX ORDER — AMOXICILLIN 500 MG/1
CAPSULE ORAL
COMMUNITY
End: 2019-10-28 | Stop reason: SDUPTHER

## 2019-09-30 RX ORDER — INSULIN GLARGINE 100 [IU]/ML
60 INJECTION, SOLUTION SUBCUTANEOUS NIGHTLY
Qty: 2 BOX | Refills: 11
Start: 2019-09-30 | End: 2019-12-30 | Stop reason: SDUPTHER

## 2019-09-30 RX ORDER — INSULIN ASPART 100 [IU]/ML
15 INJECTION, SOLUTION INTRAVENOUS; SUBCUTANEOUS
Qty: 1 BOX | Refills: 11
Start: 2019-09-30 | End: 2019-12-30 | Stop reason: SDUPTHER

## 2019-09-30 RX ORDER — ROSUVASTATIN CALCIUM 20 MG/1
20 TABLET, COATED ORAL DAILY
Qty: 90 TABLET | Refills: 3 | Status: SHIPPED | OUTPATIENT
Start: 2019-09-30 | End: 2020-12-21 | Stop reason: SDUPTHER

## 2019-09-30 NOTE — PROGRESS NOTES
"HPI:  Patient is a 63-year-old man who comes today for follow-up of his hypertension, lipids, chronic kidney disease, and diabetes as well as for his annual physical.  He states his blood sugar in the morning between 90 and 130.  He denies any hypoglycemic events.  His blood pressures been well controlled.  He denies any chest pains or shortness of breath.      Current MEDS: medcard review, verified and update  Allergies: Per the electronic medical record    Past Medical History:   Diagnosis Date    Bipolar 1 disorder     CKD stage 3 due to type 2 diabetes mellitus     Controlled diabetes mellitus with diabetic polyneuropathy     DM (diabetes mellitus) with complications     Elevated PSA     History of colon polyps     Hyperlipidemia associated with type 2 diabetes mellitus     Hypertension associated with stage 3 chronic kidney disease due to type 2 diabetes mellitus     Hyperuricemia     Knee effusion, left     Pacemaker     Type II diabetes mellitus with renal manifestations        Past Surgical History:   Procedure Laterality Date    HERNIA REPAIR      KNEE SURGERY      TRANSURETHRAL RESECTION OF PROSTATE         SHx: per the electronic medical record    FHx: recorded in the electronic medical record    ROS:    denies any chest pains or shortness of breath. Denies any nausea, vomiting or diarrhea. Denies any fever, chills or sweats. Denies any change in weight, voice, stool, skin or hair. Denies any dysuria, dyspepsia or dysphagia. Denies any change in vision, hearing or headaches. Denies any swollen lymph nodes or loss of memory.    PE:  /72 (BP Location: Left arm, Patient Position: Sitting, BP Method: Medium (Manual))   Pulse 69   Temp 96.7 °F (35.9 °C) (Tympanic)   Ht 6' 1" (1.854 m)   Wt 85.5 kg (188 lb 7.9 oz)   BMI 24.87 kg/m²   Gen: Well-developed, well-nourished, male, in no acute distress, oriented x3  HEENT: neck is supple, no adenopathy, carotids 2+ equal without bruits, " thyroid exam normal size without nodules.  CHEST: clear to auscultation and percussion  CVS: regular rate and rhythm without significant murmur, gallop, or rubs  ABD: soft, benign, no rebound no guarding, no distention.  Bowel sounds are normal.     nontender.  No palpable masses.  No organomegaly and no audible bruits.  RECTAL:  Deferred  EXT: no clubbing, cyanosis, or edema  LYMPH: no cervical, inguinal, or axillary adenopathy  FEET:  Diminished sensation on monofilament testing.  No ulcers or pressure sores.  NEURO: gait normal.  Cranial nerves II- XII intact. No nystagmus.  Speech normal.   Gross motor unremarkable.    Lab Results   Component Value Date    WBC 10.28 06/21/2019    HGB 11.1 (L) 06/21/2019    HCT 34.6 (L) 06/21/2019     06/21/2019    CHOL 113 (L) 06/21/2019    TRIG 112 06/21/2019    HDL 44 06/21/2019    ALT 13 06/21/2019    AST 23 06/21/2019     09/23/2019    K 4.7 09/23/2019     09/23/2019    CREATININE 2.4 (H) 09/23/2019    BUN 37 (H) 09/23/2019    CO2 25 09/23/2019    TSH 1.413 06/21/2019    PSA 5.8 (H) 03/05/2018    HGBA1C 9.1 (H) 09/23/2019       Impression:  Diabetes, not to goal  Hypertension, lipids and chronic kidney disease all unchanged and stable  Multiple medical problems below, stable  Patient Active Problem List   Diagnosis    DM (diabetes mellitus) with complications    Type II diabetes mellitus with renal manifestations    Hyperlipidemia associated with type 2 diabetes mellitus    Bipolar 1 disorder    CKD stage 3 due to type 2 diabetes mellitus    Hyperuricemia    Knee effusion, left    Elevated PSA    Controlled diabetes mellitus with diabetic polyneuropathy    Pacemaker    Chronic pain of left knee    Complete tear of medial collateral ligament of right knee    Cervical spondylosis with myelopathy and radiculopathy    Lumbosacral spondylosis with radiculopathy    History of colon polyps    Primary osteoarthritis of both knees    Ulcer of left  foot       Plan:   Orders Placed This Encounter    Pneumococcal Polysaccharide Vaccine (23 Valent) (SQ/IM)    Hemoglobin A1c    Lipid panel    Comprehensive metabolic panel    TSH    CBC auto differential    Protein / creatinine ratio, urine    Prostate Specific Antigen, Diagnostic    Uric acid    LANTUS SOLOSTAR U-100 INSULIN glargine 100 units/mL (3mL) SubQ pen    insulin aspart U-100 (NOVOLOG FLEXPEN U-100 INSULIN) 100 unit/mL (3 mL) InPn pen    rosuvastatin (CRESTOR) 20 MG tablet     He was given high-dose influenza and Pneumovax 23 today.  Patient will increase his Lantus to 60 units at night any increase his mealtime insulin to 15 units with each meal.  He has been reminded to limit his carbohydrate intake.  He will be seen again in 3 months with additional lab work.    This note is generated with speech recognition software and is subject to transcription error and sound alike phrases that may be missed by proofreading.

## 2019-10-28 ENCOUNTER — OFFICE VISIT (OUTPATIENT)
Dept: INTERNAL MEDICINE | Facility: CLINIC | Age: 64
End: 2019-10-28
Payer: MEDICARE

## 2019-10-28 VITALS
SYSTOLIC BLOOD PRESSURE: 108 MMHG | OXYGEN SATURATION: 98 % | HEART RATE: 94 BPM | HEIGHT: 73 IN | BODY MASS INDEX: 25.13 KG/M2 | WEIGHT: 189.63 LBS | DIASTOLIC BLOOD PRESSURE: 70 MMHG | TEMPERATURE: 98 F

## 2019-10-28 DIAGNOSIS — R05.9 COUGH: Primary | ICD-10-CM

## 2019-10-28 PROCEDURE — 99999 PR PBB SHADOW E&M-EST. PATIENT-LVL III: CPT | Mod: PBBFAC,,, | Performed by: FAMILY MEDICINE

## 2019-10-28 PROCEDURE — 99999 PR PBB SHADOW E&M-EST. PATIENT-LVL III: ICD-10-PCS | Mod: PBBFAC,,, | Performed by: FAMILY MEDICINE

## 2019-10-28 PROCEDURE — 99213 PR OFFICE/OUTPT VISIT, EST, LEVL III, 20-29 MIN: ICD-10-PCS | Mod: S$PBB,,, | Performed by: FAMILY MEDICINE

## 2019-10-28 PROCEDURE — 99213 OFFICE O/P EST LOW 20 MIN: CPT | Mod: S$PBB,,, | Performed by: FAMILY MEDICINE

## 2019-10-28 PROCEDURE — 99213 OFFICE O/P EST LOW 20 MIN: CPT | Mod: PBBFAC,PO | Performed by: FAMILY MEDICINE

## 2019-10-28 RX ORDER — BENZONATATE 200 MG/1
200 CAPSULE ORAL 3 TIMES DAILY PRN
Qty: 20 CAPSULE | Refills: 1 | Status: SHIPPED | OUTPATIENT
Start: 2019-10-28 | End: 2019-11-07

## 2019-10-28 RX ORDER — AMOXICILLIN 500 MG/1
1000 CAPSULE ORAL EVERY 12 HOURS
Qty: 40 CAPSULE | Refills: 0 | Status: SHIPPED | OUTPATIENT
Start: 2019-10-28 | End: 2019-12-30 | Stop reason: ALTCHOICE

## 2019-10-29 NOTE — PROGRESS NOTES
Subjective:      Patient ID: Hossein Will is a 63 y.o. male.    Chief Complaint: Cough      Patient reports intermittent cough which is sometimes productive which he has had for about 3 months now.  No fevers    Review of Systems   Constitutional: Negative for activity change, appetite change, fatigue and fever.   HENT: Positive for congestion and postnasal drip. Negative for ear pain, rhinorrhea, sinus pressure, sinus pain and sore throat.    Respiratory: Positive for cough. Negative for shortness of breath and wheezing.    Gastrointestinal: Negative for abdominal pain, diarrhea and nausea.   Musculoskeletal: Negative for myalgias.   Skin: Negative for rash.   Neurological: Negative for headaches.     Past Medical History:   Diagnosis Date    Bipolar 1 disorder     CKD stage 3 due to type 2 diabetes mellitus     Controlled diabetes mellitus with diabetic polyneuropathy     DM (diabetes mellitus) with complications     Elevated PSA     History of colon polyps     Hyperlipidemia associated with type 2 diabetes mellitus     Hypertension associated with stage 3 chronic kidney disease due to type 2 diabetes mellitus     Hyperuricemia     Knee effusion, left     Pacemaker     Type II diabetes mellitus with renal manifestations      Past Surgical History:   Procedure Laterality Date    HERNIA REPAIR      KNEE SURGERY      TRANSURETHRAL RESECTION OF PROSTATE       History reviewed. No pertinent family history.  Social History     Socioeconomic History    Marital status:      Spouse name: Not on file    Number of children: Not on file    Years of education: Not on file    Highest education level: Not on file   Occupational History    Not on file   Social Needs    Financial resource strain: Not on file    Food insecurity:     Worry: Not on file     Inability: Not on file    Transportation needs:     Medical: Not on file     Non-medical: Not on file   Tobacco Use    Smoking status: Never  "Smoker    Smokeless tobacco: Never Used   Substance and Sexual Activity    Alcohol use: No    Drug use: No    Sexual activity: Never   Lifestyle    Physical activity:     Days per week: Not on file     Minutes per session: Not on file    Stress: Not on file   Relationships    Social connections:     Talks on phone: Not on file     Gets together: Not on file     Attends [employer] service: Not on file     Active member of club or organization: Not on file     Attends meetings of clubs or organizations: Not on file     Relationship status: Not on file   Other Topics Concern    Not on file   Social History Narrative    Not on file     Review of patient's allergies indicates:   Allergen Reactions    Abilify [aripiprazole] Other (See Comments) and Anaphylaxis     Caused coma       Objective:       /70 (BP Location: Right arm)   Pulse 94   Temp 97.5 °F (36.4 °C) (Tympanic)   Ht 6' 1" (1.854 m)   Wt 86 kg (189 lb 9.5 oz)   SpO2 98%   BMI 25.01 kg/m²   Physical Exam   Constitutional: He is oriented to person, place, and time. He appears well-developed and well-nourished. No distress.   HENT:   Head: Normocephalic.   Right Ear: Hearing, tympanic membrane, external ear and ear canal normal.   Left Ear: Hearing, tympanic membrane, external ear and ear canal normal.   Nose: Mucosal edema present. Right sinus exhibits no maxillary sinus tenderness and no frontal sinus tenderness. Left sinus exhibits no maxillary sinus tenderness and no frontal sinus tenderness.   Mouth/Throat: Uvula is midline and mucous membranes are normal. Posterior oropharyngeal erythema present.   Eyes: Pupils are equal, round, and reactive to light. Conjunctivae and EOM are normal.   Cardiovascular: Normal rate, regular rhythm and normal heart sounds.   Pulmonary/Chest: Effort normal and breath sounds normal. No respiratory distress.   Lymphadenopathy:     He has no cervical adenopathy.   Neurological: He is alert and oriented to " "person, place, and time.   Skin: Skin is warm and dry. He is not diaphoretic.   Psychiatric: He has a normal mood and affect. His behavior is normal.   Nursing note and vitals reviewed.    Assessment:     1. Cough      Plan:   Cough    Other orders  -     amoxicillin (AMOXIL) 500 MG capsule; Take 2 capsules (1,000 mg total) by mouth every 12 (twelve) hours. Take with meal .  Dispense: 40 capsule; Refill: 0  -     benzonatate (TESSALON) 200 MG capsule; Take 1 capsule (200 mg total) by mouth 3 (three) times daily as needed.  Dispense: 20 capsule; Refill: 1      Medication List with Changes/Refills   New Medications    BENZONATATE (TESSALON) 200 MG CAPSULE    Take 1 capsule (200 mg total) by mouth 3 (three) times daily as needed.   Current Medications    ASCORBIC ACID, VITAMIN C, (VITAMIN C) 500 MG TABLET    Take 500 mg by mouth.    ASPIRIN (ECOTRIN) 81 MG EC TABLET    Take 81 mg by mouth once daily.    BD INSULIN PEN NEEDLE UF MINI 31 GAUGE X 3/16" NDLE    USE TWICE D    BD ULTRA-FINE VINCE PEN NEEDLES 32 GAUGE X 5/32" NDLE    USE ONCE D WITH INSULIN    CLONAZEPAM (KLONOPIN) 1 MG TABLET    TK 1 T PO  Q HS    DICLOFENAC SODIUM (VOLTAREN) 1 % GEL    Apply 2 g topically 2 (two) times daily.    DIVALPROEX (DEPAKOTE) 500 MG TBEC    Take 2 tablets by mouth nightly.    FREESTYLE LANCETS 28 GAUGE LANCETS    1 each by Other route once daily.    FREESTYLE LITE STRIPS STRP    Take 2 tablets by mouth nightly.    GABAPENTIN (NEURONTIN) 300 MG CAPSULE    TAKE 1 CAPSULE(300 MG) BY MOUTH EVERY EVENING    HYDROCORTISONE 2.5 % CREAM    Apply topically 2 (two) times daily.    INSULIN ASPART U-100 (NOVOLOG FLEXPEN U-100 INSULIN) 100 UNIT/ML (3 ML) INPN PEN    Inject 15 Units into the skin 3 (three) times daily with meals.    JANUVIA 100 MG TAB    TAKE 1 TABLET(100 MG) BY MOUTH ONCE DAILY    LANTUS SOLOSTAR U-100 INSULIN GLARGINE 100 UNITS/ML (3ML) SUBQ PEN    Inject 60 Units into the skin every evening.    LIDOCAINE-PRILOCAINE (EMLA) " "CREAM        METOPROLOL TARTRATE (LOPRESSOR) 25 MG TABLET    Take 25 mg by mouth daily as needed.    PEN NEEDLE, DIABETIC (BD ULTRA-FINE VINCE PEN NEEDLE) 32 GAUGE X 5/32" NDLE    Use with insulin shots    PREDNISOLONE ACETATE (PRED FORTE) 1 % DRPS    INSTILL ONE DROP IN OU BID AS NEEDED    QUETIAPINE (SEROQUEL) 400 MG TABLET    Take 400 mg by mouth nightly.    ROSUVASTATIN (CRESTOR) 20 MG TABLET    Take 1 tablet (20 mg total) by mouth once daily.    TAMSULOSIN (FLOMAX) 0.4 MG CP24    TK 1 C PO QD    TIZANIDINE (ZANAFLEX) 2 MG TABLET    TAKE 2 TABLETS(4 MG) BY MOUTH EVERY 8 HOURS AS NEEDED FOR MUSCLE PAIN    TRAMADOL (ULTRAM) 50 MG TABLET    tramadol 50 mg tablet every 8 hours as needed.    ULORIC 40 MG TAB    TAKE 1 TABLET(40 MG) BY MOUTH EVERY DAY   Changed and/or Refilled Medications    Modified Medication Previous Medication    AMOXICILLIN (AMOXIL) 500 MG CAPSULE amoxicillin (AMOXIL) 500 MG capsule       Take 2 capsules (1,000 mg total) by mouth every 12 (twelve) hours. Take with meal .    amoxicillin 500 mg capsule     "

## 2019-11-23 NOTE — PROGRESS NOTES
"HPI:  Patient is a 63-year-old man who comes today for complaints of frequent urination.  He states he is urinating every 3-4 hours.  He denies any burning on urination.    Current meds have been verified and updated per the EMR  Exam:/76   Pulse 92   Temp 97.4 °F (36.3 °C) (Tympanic)   Ht 6' 1" (1.854 m)   Wt 86 kg (189 lb 9.5 oz)   SpO2 96%   BMI 25.01 kg/m²   Exam deferred    Point of care dipstick shows a 1000 mg/dL of glucose.  Negative for nitrite or leukocyte esterase.    Lab Results   Component Value Date    WBC 10.28 06/21/2019    HGB 11.1 (L) 06/21/2019    HCT 34.6 (L) 06/21/2019     06/21/2019    CHOL 113 (L) 06/21/2019    TRIG 112 06/21/2019    HDL 44 06/21/2019    ALT 13 06/21/2019    AST 23 06/21/2019     06/21/2019    K 4.5 06/21/2019     06/21/2019    CREATININE 3.1 (H) 06/21/2019    BUN 42 (H) 06/21/2019    CO2 25 06/21/2019    TSH 1.413 06/21/2019    PSA 5.8 (H) 03/05/2018    HGBA1C 10.8 (H) 04/09/2019       Impression:  Urinary frequency due to his diabetes not being controlled.  He admits he has not been checking his blood sugars  Patient Active Problem List   Diagnosis    DM (diabetes mellitus) with complications    Type II diabetes mellitus with renal manifestations    Hyperlipidemia associated with type 2 diabetes mellitus    Bipolar 1 disorder    CKD stage 3 due to type 2 diabetes mellitus    Hyperuricemia    Knee effusion, left    Elevated PSA    Controlled diabetes mellitus with diabetic polyneuropathy    Pacemaker    Chronic pain of left knee    Complete tear of medial collateral ligament of right knee    Cervical spondylosis with myelopathy and radiculopathy    Lumbosacral spondylosis with radiculopathy    History of colon polyps    Primary osteoarthritis of both knees    Ulcer of left foot       Plan:  Orders Placed This Encounter    LANTUS SOLOSTAR U-100 INSULIN glargine 100 units/mL (3mL) SubQ pen     He will increase the Lantus to 40 " Patient was evaluated multiple times. She is awake and alert.  She was initially switched to pressure support of 10, ABG looked satisfactory.  On pressure support of 7, ABG looked satisfactory.  Patient is alert and following commands.  We will be extubate.   units at at bedtime and increase his mealtime insulin to 10 units with each meal.  He needs to start checking his blood sugar 3 times a day.  He will see me in 1 month    This note is generated with speech recognition software and is subject to transcription error and sound alike phrases that may be missed by proofreading.

## 2019-12-05 RX ORDER — GABAPENTIN 300 MG/1
CAPSULE ORAL
Qty: 30 CAPSULE | Refills: 11 | Status: SHIPPED | OUTPATIENT
Start: 2019-12-05 | End: 2020-06-16 | Stop reason: SDUPTHER

## 2019-12-24 ENCOUNTER — PATIENT OUTREACH (OUTPATIENT)
Dept: ADMINISTRATIVE | Facility: HOSPITAL | Age: 64
End: 2019-12-24

## 2019-12-24 NOTE — LETTER
December 24, 2019        Tucker San MD  [address] [facility] [address]             [facility] [address] [place] [address]  Phone: [phone]   Patient: Hossein Will   MR Number: [id number]   YOB: 1955   Date of Visit: 12/24/2019       Dear Dr. San:         We are seeing Hossein Will, YOB: 1955, at [facility]. Seun Chamberlain MD is their primary care physician. To help with our health maintenance records could you please send the following:     Most recent Colonoscopy Report  Please send fax to [phone].    Thank-you in advance for your assistance. If you have any questions or concerns, please don't hesitate to contact me at [phone].     Sincerely,  Rachel COTTO LPN Care Coordination   [facility]   Phone [phone] ext [phone],  Fax [phone]

## 2019-12-26 ENCOUNTER — LAB VISIT (OUTPATIENT)
Dept: LAB | Facility: HOSPITAL | Age: 64
End: 2019-12-26
Attending: INTERNAL MEDICINE
Payer: MEDICARE

## 2019-12-26 DIAGNOSIS — E11.8 DM (DIABETES MELLITUS) WITH COMPLICATIONS: ICD-10-CM

## 2019-12-26 DIAGNOSIS — E79.0 HYPERURICEMIA: ICD-10-CM

## 2019-12-26 DIAGNOSIS — R97.20 ELEVATED PSA: ICD-10-CM

## 2019-12-26 LAB
ALBUMIN SERPL BCP-MCNC: 3.8 G/DL (ref 3.5–5.2)
ALP SERPL-CCNC: 89 U/L (ref 55–135)
ALT SERPL W/O P-5'-P-CCNC: 14 U/L (ref 10–44)
ANION GAP SERPL CALC-SCNC: 8 MMOL/L (ref 8–16)
AST SERPL-CCNC: 20 U/L (ref 10–40)
BASOPHILS # BLD AUTO: 0.08 K/UL (ref 0–0.2)
BASOPHILS NFR BLD: 0.8 % (ref 0–1.9)
BILIRUB SERPL-MCNC: 0.3 MG/DL (ref 0.1–1)
BUN SERPL-MCNC: 33 MG/DL (ref 8–23)
CALCIUM SERPL-MCNC: 10.2 MG/DL (ref 8.7–10.5)
CHLORIDE SERPL-SCNC: 108 MMOL/L (ref 95–110)
CHOLEST SERPL-MCNC: 123 MG/DL (ref 120–199)
CHOLEST/HDLC SERPL: 2.8 {RATIO} (ref 2–5)
CO2 SERPL-SCNC: 27 MMOL/L (ref 23–29)
COMPLEXED PSA SERPL-MCNC: 1.1 NG/ML (ref 0–4)
CREAT SERPL-MCNC: 2.5 MG/DL (ref 0.5–1.4)
DIFFERENTIAL METHOD: ABNORMAL
EOSINOPHIL # BLD AUTO: 0.7 K/UL (ref 0–0.5)
EOSINOPHIL NFR BLD: 7.3 % (ref 0–8)
ERYTHROCYTE [DISTWIDTH] IN BLOOD BY AUTOMATED COUNT: 17.6 % (ref 11.5–14.5)
EST. GFR  (AFRICAN AMERICAN): 30 ML/MIN/1.73 M^2
EST. GFR  (NON AFRICAN AMERICAN): 26 ML/MIN/1.73 M^2
ESTIMATED AVG GLUCOSE: 200 MG/DL (ref 68–131)
GLUCOSE SERPL-MCNC: 115 MG/DL (ref 70–110)
HBA1C MFR BLD HPLC: 8.6 % (ref 4–5.6)
HCT VFR BLD AUTO: 33 % (ref 40–54)
HDLC SERPL-MCNC: 44 MG/DL (ref 40–75)
HDLC SERPL: 35.8 % (ref 20–50)
HGB BLD-MCNC: 10.7 G/DL (ref 14–18)
IMM GRANULOCYTES # BLD AUTO: 0.04 K/UL (ref 0–0.04)
IMM GRANULOCYTES NFR BLD AUTO: 0.4 % (ref 0–0.5)
LDLC SERPL CALC-MCNC: 54.6 MG/DL (ref 63–159)
LYMPHOCYTES # BLD AUTO: 5.1 K/UL (ref 1–4.8)
LYMPHOCYTES NFR BLD: 53.2 % (ref 18–48)
MCH RBC QN AUTO: 27.9 PG (ref 27–31)
MCHC RBC AUTO-ENTMCNC: 32.4 G/DL (ref 32–36)
MCV RBC AUTO: 86 FL (ref 82–98)
MONOCYTES # BLD AUTO: 0.8 K/UL (ref 0.3–1)
MONOCYTES NFR BLD: 8.1 % (ref 4–15)
NEUTROPHILS # BLD AUTO: 2.9 K/UL (ref 1.8–7.7)
NEUTROPHILS NFR BLD: 30.2 % (ref 38–73)
NONHDLC SERPL-MCNC: 79 MG/DL
NRBC BLD-RTO: 0 /100 WBC
PLATELET # BLD AUTO: 174 K/UL (ref 150–350)
PMV BLD AUTO: 12.1 FL (ref 9.2–12.9)
POTASSIUM SERPL-SCNC: 4.8 MMOL/L (ref 3.5–5.1)
PROT SERPL-MCNC: 7.3 G/DL (ref 6–8.4)
RBC # BLD AUTO: 3.83 M/UL (ref 4.6–6.2)
SODIUM SERPL-SCNC: 143 MMOL/L (ref 136–145)
TRIGL SERPL-MCNC: 122 MG/DL (ref 30–150)
TSH SERPL DL<=0.005 MIU/L-ACNC: 1.49 UIU/ML (ref 0.4–4)
URATE SERPL-MCNC: 3.4 MG/DL (ref 3.4–7)
WBC # BLD AUTO: 9.64 K/UL (ref 3.9–12.7)

## 2019-12-26 PROCEDURE — 36415 COLL VENOUS BLD VENIPUNCTURE: CPT | Mod: PO

## 2019-12-26 PROCEDURE — 80053 COMPREHEN METABOLIC PANEL: CPT

## 2019-12-26 PROCEDURE — 80061 LIPID PANEL: CPT

## 2019-12-26 PROCEDURE — 84153 ASSAY OF PSA TOTAL: CPT

## 2019-12-26 PROCEDURE — 85025 COMPLETE CBC W/AUTO DIFF WBC: CPT

## 2019-12-26 PROCEDURE — 84443 ASSAY THYROID STIM HORMONE: CPT

## 2019-12-26 PROCEDURE — 84550 ASSAY OF BLOOD/URIC ACID: CPT

## 2019-12-26 PROCEDURE — 83036 HEMOGLOBIN GLYCOSYLATED A1C: CPT

## 2019-12-30 ENCOUNTER — OFFICE VISIT (OUTPATIENT)
Dept: INTERNAL MEDICINE | Facility: CLINIC | Age: 64
End: 2019-12-30
Payer: MEDICARE

## 2019-12-30 VITALS
BODY MASS INDEX: 26.67 KG/M2 | HEIGHT: 72 IN | TEMPERATURE: 98 F | OXYGEN SATURATION: 99 % | SYSTOLIC BLOOD PRESSURE: 134 MMHG | WEIGHT: 196.88 LBS | HEART RATE: 109 BPM | DIASTOLIC BLOOD PRESSURE: 72 MMHG

## 2019-12-30 DIAGNOSIS — E11.69 HYPERLIPIDEMIA ASSOCIATED WITH TYPE 2 DIABETES MELLITUS: ICD-10-CM

## 2019-12-30 DIAGNOSIS — Z95.0 PACEMAKER: ICD-10-CM

## 2019-12-30 DIAGNOSIS — N18.30 CKD STAGE 3 DUE TO TYPE 2 DIABETES MELLITUS: ICD-10-CM

## 2019-12-30 DIAGNOSIS — N18.30 TYPE 2 DIABETES MELLITUS WITH STAGE 3 CHRONIC KIDNEY DISEASE, WITHOUT LONG-TERM CURRENT USE OF INSULIN: ICD-10-CM

## 2019-12-30 DIAGNOSIS — E11.22 TYPE 2 DIABETES MELLITUS WITH STAGE 3 CHRONIC KIDNEY DISEASE, WITHOUT LONG-TERM CURRENT USE OF INSULIN: ICD-10-CM

## 2019-12-30 DIAGNOSIS — E11.42 CONTROLLED TYPE 2 DIABETES MELLITUS WITH DIABETIC POLYNEUROPATHY, WITHOUT LONG-TERM CURRENT USE OF INSULIN: Primary | ICD-10-CM

## 2019-12-30 DIAGNOSIS — E11.8 DM (DIABETES MELLITUS) WITH COMPLICATIONS: ICD-10-CM

## 2019-12-30 DIAGNOSIS — E79.0 HYPERURICEMIA: ICD-10-CM

## 2019-12-30 DIAGNOSIS — E78.5 HYPERLIPIDEMIA ASSOCIATED WITH TYPE 2 DIABETES MELLITUS: ICD-10-CM

## 2019-12-30 DIAGNOSIS — E11.22 CKD STAGE 3 DUE TO TYPE 2 DIABETES MELLITUS: ICD-10-CM

## 2019-12-30 DIAGNOSIS — L97.529 ULCER OF LEFT FOOT, UNSPECIFIED ULCER STAGE: ICD-10-CM

## 2019-12-30 DIAGNOSIS — F31.9 BIPOLAR 1 DISORDER: ICD-10-CM

## 2019-12-30 PROCEDURE — 99213 OFFICE O/P EST LOW 20 MIN: CPT | Mod: PBBFAC,PO | Performed by: INTERNAL MEDICINE

## 2019-12-30 PROCEDURE — 99999 PR PBB SHADOW E&M-EST. PATIENT-LVL III: CPT | Mod: PBBFAC,,, | Performed by: INTERNAL MEDICINE

## 2019-12-30 PROCEDURE — 1159F MED LIST DOCD IN RCRD: CPT | Mod: ,,, | Performed by: INTERNAL MEDICINE

## 2019-12-30 PROCEDURE — 99214 OFFICE O/P EST MOD 30 MIN: CPT | Mod: S$PBB,,, | Performed by: INTERNAL MEDICINE

## 2019-12-30 PROCEDURE — 1126F PR PAIN SEVERITY QUANTIFIED, NO PAIN PRESENT: ICD-10-PCS | Mod: ,,, | Performed by: INTERNAL MEDICINE

## 2019-12-30 PROCEDURE — 1126F AMNT PAIN NOTED NONE PRSNT: CPT | Mod: ,,, | Performed by: INTERNAL MEDICINE

## 2019-12-30 PROCEDURE — 99214 PR OFFICE/OUTPT VISIT, EST, LEVL IV, 30-39 MIN: ICD-10-PCS | Mod: S$PBB,,, | Performed by: INTERNAL MEDICINE

## 2019-12-30 PROCEDURE — 1159F PR MEDICATION LIST DOCUMENTED IN MEDICAL RECORD: ICD-10-PCS | Mod: ,,, | Performed by: INTERNAL MEDICINE

## 2019-12-30 PROCEDURE — 99999 PR PBB SHADOW E&M-EST. PATIENT-LVL III: ICD-10-PCS | Mod: PBBFAC,,, | Performed by: INTERNAL MEDICINE

## 2019-12-30 RX ORDER — INSULIN ASPART 100 [IU]/ML
20 INJECTION, SOLUTION INTRAVENOUS; SUBCUTANEOUS
Qty: 1 BOX | Refills: 11 | Status: SHIPPED | OUTPATIENT
Start: 2019-12-30 | End: 2021-10-21

## 2019-12-30 RX ORDER — LATANOPROST 50 UG/ML
SOLUTION/ DROPS OPHTHALMIC
COMMUNITY
Start: 2019-12-19

## 2019-12-30 RX ORDER — INSULIN GLARGINE 100 [IU]/ML
40 INJECTION, SOLUTION SUBCUTANEOUS 2 TIMES DAILY
Qty: 2 BOX | Refills: 11 | Status: SHIPPED | OUTPATIENT
Start: 2019-12-30 | End: 2020-02-20 | Stop reason: SDUPTHER

## 2019-12-30 NOTE — PROGRESS NOTES
HPI:  Patient is a 64-year-old man who comes today for follow-up of his diabetes, hypertension, chronic kidney disease, anemia.  Patient has been doing about the same.  He denies any hypoglycemia.  He states sugars are doing better.  His blood pressure at home has also been well controlled.  He only uses p.r.n. medications for blood pressure.  [facility]  He denies any flares of gout.    Current meds have been verified and updated per the EMR  Exam:/72   Pulse 109   Temp 97.8 °F (36.6 °C) (Tympanic)   Ht 6' (1.829 m)   Wt 89.3 kg (196 lb 13.9 oz)   SpO2 99%   BMI 26.70 kg/m²   Carotids 2+ equal without bruits  Chest clear  Cardiovascular regular rate and rhythm without murmur gallop or rub  Abdomen soft benign no rebound, no guarding, no distention.  Bowel sounds are normal.  No audible bruits.    Lab Results   Component Value Date    WBC 9.64 12/26/2019    HGB 10.7 (L) 12/26/2019    HCT 33.0 (L) 12/26/2019     12/26/2019    CHOL 123 12/26/2019    TRIG 122 12/26/2019    HDL 44 12/26/2019    ALT 14 12/26/2019    AST 20 12/26/2019     12/26/2019    K 4.8 12/26/2019     12/26/2019    CREATININE 2.5 (H) 12/26/2019    BUN 33 (H) 12/26/2019    CO2 27 12/26/2019    TSH 1.485 12/26/2019    PSA 5.8 (H) 03/05/2018    HGBA1C 8.6 (H) 12/26/2019       Impression:  Diabetes, control has improved yet not to goal.  His hemoglobin A1c Is now 8.6.  Hypertension, well controlled   Lipids, stable  Chronic kidney disease, stage III, stable  Other medical problems below, stable  Patient Active Problem List   Diagnosis    DM (diabetes mellitus) with complications    Type II diabetes mellitus with renal manifestations    Hyperlipidemia associated with type 2 diabetes mellitus    Bipolar 1 disorder    CKD stage 3 due to type 2 diabetes mellitus    Hyperuricemia    Knee effusion, left    Elevated PSA    Controlled diabetes mellitus with diabetic  polyneuropathy    Pacemaker    Chronic pain of left knee    Complete tear of medial collateral ligament of right knee    Cervical spondylosis with myelopathy and radiculopathy    Lumbosacral spondylosis with radiculopathy    History of colon polyps    Primary osteoarthritis of both knees    Ulcer of left foot    Hypertension associated with stage 3 chronic kidney disease due to type 2 diabetes mellitus       Plan:  Orders Placed This Encounter    Hemoglobin A1c    Basic metabolic panel    CBC auto differential    Uric acid    LANTUS SOLOSTAR U-100 INSULIN glargine 100 units/mL (3mL) SubQ pen    insulin aspart U-100 (NOVOLOG FLEXPEN U-100 INSULIN) 100 unit/mL (3 mL) InPn pen     He will increase his Lantus to take 40 units twice a day.  He will increase his mealtime insulin to 20 units with each meal.  His other medications remain the same.  He will be seen again in    This note is generated with speech recognition software and is subject to transcription error and sound alike phrases that may be missed by proofreading.

## 2020-01-15 ENCOUNTER — CLINICAL SUPPORT (OUTPATIENT)
Dept: DIABETES | Facility: CLINIC | Age: 65
End: 2020-01-15
Payer: MEDICARE

## 2020-01-15 VITALS — HEIGHT: 72 IN | WEIGHT: 194.13 LBS | BODY MASS INDEX: 26.3 KG/M2

## 2020-01-15 DIAGNOSIS — E11.8 DM (DIABETES MELLITUS) WITH COMPLICATIONS: Primary | ICD-10-CM

## 2020-01-15 DIAGNOSIS — E11.8 DM (DIABETES MELLITUS) WITH COMPLICATIONS: ICD-10-CM

## 2020-01-15 PROCEDURE — G0108 DIAB MANAGE TRN  PER INDIV: HCPCS | Mod: PBBFAC | Performed by: DIETITIAN, REGISTERED

## 2020-01-15 PROCEDURE — 99999 PR PBB SHADOW E&M-EST. PATIENT-LVL III: CPT | Mod: PBBFAC,,, | Performed by: DIETITIAN, REGISTERED

## 2020-01-15 PROCEDURE — 99213 OFFICE O/P EST LOW 20 MIN: CPT | Mod: PBBFAC | Performed by: DIETITIAN, REGISTERED

## 2020-01-15 PROCEDURE — 99999 PR PBB SHADOW E&M-EST. PATIENT-LVL III: ICD-10-PCS | Mod: PBBFAC,,, | Performed by: DIETITIAN, REGISTERED

## 2020-01-15 NOTE — PROGRESS NOTES
Diabetes Education  Author: Courtney Michelle RD  Date: 1/15/2020    Diabetes Care Management Summary  Diabetes Education Record Assessment/Progress: Initial  Current Diabetes Risk Level: Moderate     Referring Provider: Seun Chamberlain MD  64 y.o. male in clinic today for diabetes education. Patient has taken diabetes education courses in the past, over 10 yrs ago.   Hospital for 1 month in May 2018 after falling due to either high or low BG.   Pt still going to wound clinic once a week for wound on ankle.     Weight: 88 kg (194 lb 1.8 oz)   Height: 6' (182.9 cm)   Body mass index is 26.33 kg/m².    Lab Results   Component Value Date    LABA1C 10.4 07/15/2019    HGBA1C 8.6 (H) 12/26/2019       Diabetes Type  Diabetes Type : Type II    Diabetes History  Diabetes Diagnosis: >10 years  Current Treatment: Diet, Oral Medication, Insulin(Lantus, 40 units bid  // Novolog, 20 units after each meal  // Januvia 100mg at night )  Reviewed Problem List with Patient: Yes    Health Maintenance was reviewed today with patient. Discussed with patient importance of routine eye exams, foot exams/foot care, blood work (i.e.: A1c, microalbumin, and lipid), dental visits, yearly flu vaccine, and pneumonia vaccine as indicated by PCP. Patient verbalized understanding.     Health Maintenance Topics with due status: Not Due       Topic Last Completion Date    Colonoscopy 03/30/2015    Eye Exam 06/26/2019    Pneumococcal Vaccine (65+ High/Highest Risk) 09/30/2019    Foot Exam 09/30/2019    Lipid Panel 12/26/2019    Hemoglobin A1c 12/26/2019    Urine Microalbumin 12/26/2019     Health Maintenance Due   Topic Date Due    Hepatitis C Screening  1955    TETANUS VACCINE  12/29/1971       Nutrition  Meal Planning: 3 meals per day  Meal Plan 24 Hour Recall - Breakfast: 8a - Glucerna then went to wound clinic // 10a - 2 pc Greenlandic toast (1 sl bread), sausage, coffee w/Sweetnlow  Meal Plan 24 Hour Recall - Lunch: 3p - boudin, baked potato,  Dr. Pepper   Meal Plan 24 Hour Recall - Dinner: 10p - greens, baked fish, pc cornbread, water   Meal Plan 24 Hour Recall - Snack: none // sometimes pecans or orange or banana    Monitoring   Self Monitoring : checks BG every morning // per pt recall, fst:   Blood Glucose Logs: No  Do you use a personal continuous glucose monitor?: No  In the last month, how often have you had a low blood sugar reaction?: never    Exercise   Exercise Type: none(pt walks with walker since he hurt his knee over 1 yr ago )    Current Diabetes Treatment   Current Treatment: Diet, Oral Medication, Insulin(Lantus, 40 units bid  // Novolog, 20 units after each meal  // Januvia 100mg at night )    Social History  Primary Support: Self, Spouse(lives with wife of 30 yrs, mother-in-law and daughter - lives in 14 BR house )  Occupation: retired    Smoking Status: Never a Smoker  Alcohol Use: Never                                Barriers to Change  Barriers to Change: None  Learning Challenges : None    Readiness to Learn   Readiness to Learn : Eager    Cultural Influences  Cultural Influences: No    Diabetes Education Assessment/Progress  Diabetes Disease Process (diabetes disease process and treatment options): Discussion, Individual Session, Comprehends Key Points  Nutrition (Incorporating nutritional management into one's lifestyle): Discussion, Demonstration, Instructed, Individual Session, Demonstrates Understanding/Competency (verbalizes/demonstrates), Written Materials Provided, Comprehends Key Points  Physical Activity (incorporating physical activity into one's lifestyle): Not Covered/Deferred  Medications (states correct name, dose, onset, peak, duration, side effects & timing of meds): Discussion, Individual Session, Demonstrates Understanding/Competency(verbalizes/demonstrates)  Monitoring (monitoring blood glucose/other parameters & using results): Discussion, Individual Session, Written Materials Provided, Demonstrates  Understanding/Competency (verbalizes/demonstrates), Instructed  Acute Complications (preventing, detecting, and treating acute complications): Discussion, Individual Session, Demonstrates Understanding/Competency (verbalizes/demonstrates)  Chronic Complications (preventing, detecting, and treating chronic complications): Discussion, Individual Session, Demonstrates Understanding/Competency (verbalizes/demonstrates)  Clinical (diabetes, other pertinent medical history, and relevant comorbidities reviewed during visit): Discussion, Individual Session  Cognitive (knowledge of self-management skills, functional health literacy): Discussion, Individual Session  Psychosocial (emotional response to diabetes): Not Covered/Deferred  Diabetes Distress and Support Systems: Not Covered/Deferred  Behavioral (readiness for change, lifestyle practices, self-care behaviors): Discussion, Individual Session    Goals  Patient has selected/evaluated goals during today's session: Yes, selected  Healthy Eating: Set(Limit portions of carbs with each meal - 45-60 grams per meal; Drink SF/diet sodas or water rather than regular sodas)  Start Date: 01/15/20  Target Date: 04/15/20  Monitoring: Set(check BG before meals and record on logsheet)  Start Date: 01/15/20  Target Date: 04/15/20  Medications: Set(Take Novolog before meals rather than after meals )  Start Date: 01/15/20  Target Date: 04/15/20         Diabetes Care Plan/Intervention  Education Plan/Intervention: Individual Follow-Up DSMT  F/u in 3 months  If BG not improved from changes being made, will refer to DM mgt (pt wants to try diet changes and change the way he's taking insulin first)    Diabetes Meal Plan  Calories: 2000, 2200  Carbohydrate Per Meal: 45-60g  Carbohydrate Per Snack : 7-15g    Today's Self-Management Care Plan was developed with the patient's input and is based on barriers identified during today's assessment.    The long and short-term goals in the care plan  were written with the patient/caregiver's input. The patient has agreed to work toward these goals to improve his overall diabetes control.      The patient received a copy of today's self-management plan and verbalized understanding of the care plan, goals, and all of today's instructions.      The patient was encouraged to communicate with his physician and care team regarding his condition(s) and treatment.  I provided the patient with my contact information today and encouraged him to contact me via phone or patient portal as needed.     Education Units of Time   Time Spent: 60 min

## 2020-01-15 NOTE — LETTER
January 15, 2020        Seun Chamberlain MD  [address]  [address]  [place] [address]             [facility]  [address] [place] [address] , [address]RD FLOOR  [place] [address]  Phone: [phone]  Fax: [phone]   Patient: Hossein Will   MR Number: [id number]   YOB: 1955   Date of Visit: 1/15/2020       Dear Dr. Chamberlain:    Thank you for referring Hossein Will to me for evaluation. Below are the relevant portions of my assessment and plan of care.       If you have questions, please do not hesitate to call me. I look forward to following Hossein along with you.    Sincerely,      Courtney Michelle RD           CC  No Recipients

## 2020-02-20 ENCOUNTER — HOSPITAL ENCOUNTER (OUTPATIENT)
Dept: RADIOLOGY | Facility: HOSPITAL | Age: 65
Discharge: HOME | End: 2020-02-20
Attending: FAMILY MEDICINE
Payer: MEDICARE

## 2020-02-20 ENCOUNTER — OFFICE VISIT (OUTPATIENT)
Dept: INTERNAL MEDICINE | Facility: CLINIC | Age: 65
End: 2020-02-20
Payer: MEDICARE

## 2020-02-20 VITALS
DIASTOLIC BLOOD PRESSURE: 58 MMHG | WEIGHT: 199.31 LBS | SYSTOLIC BLOOD PRESSURE: 98 MMHG | OXYGEN SATURATION: 98 % | HEART RATE: 84 BPM | TEMPERATURE: 96 F | HEIGHT: 72 IN | BODY MASS INDEX: 27 KG/M2

## 2020-02-20 DIAGNOSIS — N18.30 CKD STAGE 3 DUE TO TYPE 2 DIABETES MELLITUS: ICD-10-CM

## 2020-02-20 DIAGNOSIS — E11.42 CONTROLLED TYPE 2 DIABETES MELLITUS WITH DIABETIC POLYNEUROPATHY, WITHOUT LONG-TERM CURRENT USE OF INSULIN: ICD-10-CM

## 2020-02-20 DIAGNOSIS — M54.9 UPPER BACK PAIN ON RIGHT SIDE: ICD-10-CM

## 2020-02-20 DIAGNOSIS — I95.9 HYPOTENSION, UNSPECIFIED HYPOTENSION TYPE: Primary | ICD-10-CM

## 2020-02-20 DIAGNOSIS — E11.22 CKD STAGE 3 DUE TO TYPE 2 DIABETES MELLITUS: ICD-10-CM

## 2020-02-20 PROCEDURE — 99214 OFFICE O/P EST MOD 30 MIN: CPT | Mod: PBBFAC,25,PO | Performed by: FAMILY MEDICINE

## 2020-02-20 PROCEDURE — 73010 X-RAY EXAM OF SHOULDER BLADE: CPT | Mod: TC,PO,RT

## 2020-02-20 PROCEDURE — 99999 PR PBB SHADOW E&M-EST. PATIENT-LVL IV: ICD-10-PCS | Mod: PBBFAC,,, | Performed by: FAMILY MEDICINE

## 2020-02-20 PROCEDURE — 73010 XR SCAPULA RIGHT: ICD-10-PCS | Mod: 26,RT,, | Performed by: RADIOLOGY

## 2020-02-20 PROCEDURE — 99214 OFFICE O/P EST MOD 30 MIN: CPT | Mod: S$PBB,,, | Performed by: FAMILY MEDICINE

## 2020-02-20 PROCEDURE — 73010 X-RAY EXAM OF SHOULDER BLADE: CPT | Mod: 26,RT,, | Performed by: RADIOLOGY

## 2020-02-20 PROCEDURE — 99214 PR OFFICE/OUTPT VISIT, EST, LEVL IV, 30-39 MIN: ICD-10-PCS | Mod: S$PBB,,, | Performed by: FAMILY MEDICINE

## 2020-02-20 PROCEDURE — 99999 PR PBB SHADOW E&M-EST. PATIENT-LVL IV: CPT | Mod: PBBFAC,,, | Performed by: FAMILY MEDICINE

## 2020-02-20 RX ORDER — METOPROLOL TARTRATE 25 MG/1
25 TABLET, FILM COATED ORAL DAILY PRN
Qty: 30 TABLET | Refills: 5 | Status: SHIPPED | OUTPATIENT
Start: 2020-02-20 | End: 2020-08-25 | Stop reason: SDUPTHER

## 2020-02-20 RX ORDER — INSULIN GLARGINE 100 [IU]/ML
42 INJECTION, SOLUTION SUBCUTANEOUS 2 TIMES DAILY
Qty: 2 BOX | Refills: 11
Start: 2020-02-20 | End: 2021-07-16 | Stop reason: SDUPTHER

## 2020-02-23 NOTE — PROGRESS NOTES
Subjective:      Patient ID: Hossein Will is a 64 y.o. male.    Chief Complaint: Back Pain; Shoulder Pain (right ); and Medication Problem (BP med)      Patient reports pain in his right upper back around the shoulder blade, has had this for a few weeks now but is worsening, no recent injuries or trauma to the area.  He is requesting to change his metoprolol to 25 mg from 50, has noticed his blood pressures been very low recently, apparently takes only as needed when his blood pressure gets high.  He reports he saw his nephrologist yesterday, who recommended that he cut his Januvia dose down to 50 mg daily as he thought this might be affecting his renal function.    Review of Systems   Constitutional: Positive for fatigue.   HENT: Negative for congestion.    Eyes: Negative for visual disturbance.   Respiratory: Negative for chest tightness.    Cardiovascular: Negative for chest pain.   Gastrointestinal: Negative for abdominal pain.   Endocrine: Negative for polydipsia, polyphagia and polyuria.   Musculoskeletal: Positive for arthralgias and back pain. Negative for gait problem.   Skin: Negative for color change, rash and wound.   Neurological: Negative for dizziness, facial asymmetry and light-headedness.     Past Medical History:   Diagnosis Date    Bipolar 1 disorder     CKD stage 3 due to type 2 diabetes mellitus     Controlled diabetes mellitus with diabetic polyneuropathy     DM (diabetes mellitus) with complications     Elevated PSA     History of colon polyps     Hyperlipidemia associated with type 2 diabetes mellitus     Hypertension associated with stage 3 chronic kidney disease due to type 2 diabetes mellitus     Hyperuricemia     Knee effusion, left     Pacemaker     Type II diabetes mellitus with renal manifestations           Past Surgical History:   Procedure Laterality Date    HERNIA REPAIR      KNEE SURGERY      TRANSURETHRAL RESECTION OF PROSTATE       History reviewed. No  pertinent family history.  Social History     Socioeconomic History    Marital status:      Spouse name: Not on file    Number of children: Not on file    Years of education: Not on file    Highest education level: Not on file   Occupational History    Not on file   Social Needs    Financial resource strain: Not on file    Food insecurity:     Worry: Not on file     Inability: Not on file    Transportation needs:     Medical: Not on file     Non-medical: Not on file   Tobacco Use    Smoking status: Never Smoker    Smokeless tobacco: Never Used   Substance and Sexual Activity    Alcohol use: No    Drug use: No    Sexual activity: Never   Lifestyle    Physical activity:     Days per week: Not on file     Minutes per session: Not on file    Stress: Only a little   Relationships    Social connections:     Talks on phone: Not on file     Gets together: Not on file     Attends Evangelical service: Not on file     Active member of club or organization: Not on file     Attends meetings of clubs or organizations: Not on file     Relationship status: Not on file   Other Topics Concern    Not on file   Social History Narrative    Not on file     Review of patient's allergies indicates:   Allergen Reactions    Abilify [aripiprazole] Other (See Comments) and Anaphylaxis     Caused coma       Objective:       BP (!) 98/58 (BP Location: Left arm, Patient Position: Sitting, BP Method: Large (Manual))   Pulse 84   Temp 96 °F (35.6 °C) (Tympanic)   Ht 6' (1.829 m)   Wt 90.4 kg (199 lb 4.7 oz)   SpO2 98%   BMI 27.03 kg/m²   Physical Exam   Constitutional: He is oriented to person, place, and time. He appears well-developed and well-nourished. No distress.   HENT:   Head: Normocephalic.   Right Ear: Hearing, tympanic membrane, external ear and ear canal normal.   Left Ear: Hearing, tympanic membrane, external ear and ear canal normal.   Nose: Nose normal. Right sinus exhibits no maxillary sinus tenderness  and no frontal sinus tenderness. Left sinus exhibits no maxillary sinus tenderness and no frontal sinus tenderness.   Mouth/Throat: Uvula is midline, oropharynx is clear and moist and mucous membranes are normal. No oropharyngeal exudate.   Eyes: Pupils are equal, round, and reactive to light. Conjunctivae and EOM are normal.   Neck: Normal range of motion. Neck supple.   Cardiovascular: Normal rate, regular rhythm and normal heart sounds.   Pulmonary/Chest: Effort normal and breath sounds normal. No respiratory distress.   Abdominal: Soft. Bowel sounds are normal. There is no tenderness. There is no guarding. No hernia.   Musculoskeletal: Normal range of motion. He exhibits tenderness (Right scapular). He exhibits no edema or deformity.   Neurological: He is alert and oriented to person, place, and time. No sensory deficit.   Skin: Skin is warm and dry. He is not diaphoretic.   Psychiatric: He has a normal mood and affect. His behavior is normal.   Nursing note and vitals reviewed.    Assessment:     1. Hypotension, unspecified hypotension type    2. Upper back pain on right side    3. Controlled type 2 diabetes mellitus with diabetic polyneuropathy, without long-term current use of insulin    4. CKD stage 3 due to type 2 diabetes mellitus      Plan:   Hypotension, unspecified hypotension type    Upper back pain on right side  -     X-Ray Scapula Right; Future; Expected date: 02/20/2020  -     Ambulatory referral/consult to Physical Therapy; Future; Expected date: 02/27/2020    Controlled type 2 diabetes mellitus with diabetic polyneuropathy, without long-term current use of insulin    CKD stage 3 due to type 2 diabetes mellitus    Other orders  -     metoprolol tartrate (LOPRESSOR) 25 MG tablet; Take 1 tablet (25 mg total) by mouth daily as needed.  Dispense: 30 tablet; Refill: 5  -     SITagliptin (JANUVIA) 50 MG Tab; Take 1 tablet (50 mg total) by mouth once daily.  Dispense: 90 tablet; Refill: 3  -     LANTUS  "SOLOSTAR U-100 INSULIN glargine 100 units/mL (3mL) SubQ pen; Inject 42 Units into the skin 2 (two) times daily.  Dispense: 2 Box; Refill: 11     Decreased to new via to 50 mg daily, increase his Lantus dose by 4 units daily.  He will follow with Dr. Chamberlain as previously scheduled on this.  Medication List with Changes/Refills   Current Medications    ASCORBIC ACID, VITAMIN C, (VITAMIN C) 500 MG TABLET    Take 500 mg by mouth.    ASPIRIN (ECOTRIN) 81 MG EC TABLET    Take 81 mg by mouth once daily.    BD INSULIN PEN NEEDLE UF MINI 31 GAUGE X 3/16" NDLE    USE TWICE D    BD ULTRA-FINE VINCE PEN NEEDLES 32 GAUGE X 5/32" NDLE    USE ONCE D WITH INSULIN    CLONAZEPAM (KLONOPIN) 1 MG TABLET    TK 1 T PO  Q HS    DICLOFENAC SODIUM (VOLTAREN) 1 % GEL    Apply 2 g topically 2 (two) times daily.    DIVALPROEX (DEPAKOTE) 500 MG TBEC    Take 2 tablets by mouth nightly.    FREESTYLE LANCETS 28 GAUGE LANCETS    1 each by Other route once daily.    FREESTYLE LITE STRIPS STRP    Take 2 tablets by mouth nightly.    GABAPENTIN (NEURONTIN) 300 MG CAPSULE    TAKE 1 CAPSULE(300 MG) BY MOUTH EVERY EVENING    HYDROCORTISONE 2.5 % CREAM    Apply topically 2 (two) times daily.    INSULIN ASPART U-100 (NOVOLOG FLEXPEN U-100 INSULIN) 100 UNIT/ML (3 ML) INPN PEN    Inject 20 Units into the skin 3 (three) times daily with meals.    LATANOPROST 0.005 % OPHTHALMIC SOLUTION    INT 1 GTT IN OU HS    LIDOCAINE-PRILOCAINE (EMLA) CREAM        PEN NEEDLE, DIABETIC (BD ULTRA-FINE VINCE PEN NEEDLE) 32 GAUGE X 5/32" NDLE    Use with insulin shots    PREDNISOLONE ACETATE (PRED FORTE) 1 % DRPS    INSTILL ONE DROP IN OU BID AS NEEDED    QUETIAPINE (SEROQUEL) 400 MG TABLET    Take 400 mg by mouth nightly.    ROSUVASTATIN (CRESTOR) 20 MG TABLET    Take 1 tablet (20 mg total) by mouth once daily.    TAMSULOSIN (FLOMAX) 0.4 MG CP24    TK 1 C PO QD    TIZANIDINE (ZANAFLEX) 2 MG TABLET    TAKE 2 TABLETS(4 MG) BY MOUTH EVERY 8 HOURS AS NEEDED FOR MUSCLE PAIN    " TRAMADOL (ULTRAM) 50 MG TABLET    tramadol 50 mg tablet every 8 hours as needed.    ULORIC 40 MG TAB    TAKE 1 TABLET(40 MG) BY MOUTH EVERY DAY   Changed and/or Refilled Medications    Modified Medication Previous Medication    LANTUS SOLOSTAR U-100 INSULIN GLARGINE 100 UNITS/ML (3ML) SUBQ PEN LANTUS SOLOSTAR U-100 INSULIN glargine 100 units/mL (3mL) SubQ pen       Inject 42 Units into the skin 2 (two) times daily.    Inject 40 Units into the skin 2 (two) times daily.    METOPROLOL TARTRATE (LOPRESSOR) 25 MG TABLET metoprolol tartrate (LOPRESSOR) 25 MG tablet       Take 1 tablet (25 mg total) by mouth daily as needed.    Take 25 mg by mouth daily as needed.    SITAGLIPTIN (JANUVIA) 50 MG TAB JANUVIA 100 mg Tab       Take 1 tablet (50 mg total) by mouth once daily.    TAKE 1 TABLET(100 MG) BY MOUTH ONCE DAILY

## 2020-02-25 ENCOUNTER — PATIENT OUTREACH (OUTPATIENT)
Dept: ADMINISTRATIVE | Facility: HOSPITAL | Age: 65
End: 2020-02-25

## 2020-02-25 NOTE — PROGRESS NOTES
Per A1C report, LAB APPT 03/30/20 AND APPT WITH DR. SANDERS 04/06/20. Instructed pt on appt. Pt verbalized understanding.

## 2020-02-28 ENCOUNTER — LAB VISIT (OUTPATIENT)
Dept: LAB | Facility: HOSPITAL | Age: 65
End: 2020-02-28
Attending: INTERNAL MEDICINE
Payer: MEDICARE

## 2020-02-28 DIAGNOSIS — E11.628 BULLOSIS DIABETICORUM: Primary | ICD-10-CM

## 2020-02-28 LAB
ALBUMIN SERPL BCP-MCNC: 4.2 G/DL (ref 3.5–5.2)
ALP SERPL-CCNC: 75 U/L (ref 55–135)
ALT SERPL W/O P-5'-P-CCNC: 13 U/L (ref 10–44)
ANION GAP SERPL CALC-SCNC: 10 MMOL/L (ref 8–16)
AST SERPL-CCNC: 22 U/L (ref 10–40)
BASOPHILS # BLD AUTO: 0.05 K/UL (ref 0–0.2)
BASOPHILS NFR BLD: 0.6 % (ref 0–1.9)
BILIRUB SERPL-MCNC: 0.3 MG/DL (ref 0.1–1)
BUN SERPL-MCNC: 33 MG/DL (ref 8–23)
CALCIUM SERPL-MCNC: 10 MG/DL (ref 8.7–10.5)
CHLORIDE SERPL-SCNC: 106 MMOL/L (ref 95–110)
CO2 SERPL-SCNC: 25 MMOL/L (ref 23–29)
CREAT SERPL-MCNC: 2.6 MG/DL (ref 0.5–1.4)
CRP SERPL-MCNC: 4.4 MG/L (ref 0–8.2)
DIFFERENTIAL METHOD: ABNORMAL
EOSINOPHIL # BLD AUTO: 0.4 K/UL (ref 0–0.5)
EOSINOPHIL NFR BLD: 4.5 % (ref 0–8)
ERYTHROCYTE [DISTWIDTH] IN BLOOD BY AUTOMATED COUNT: 17.2 % (ref 11.5–14.5)
ERYTHROCYTE [SEDIMENTATION RATE] IN BLOOD BY WESTERGREN METHOD: 16 MM/HR (ref 0–10)
EST. GFR  (AFRICAN AMERICAN): 28 ML/MIN/1.73 M^2
EST. GFR  (NON AFRICAN AMERICAN): 25 ML/MIN/1.73 M^2
GLUCOSE SERPL-MCNC: 115 MG/DL (ref 70–110)
HCT VFR BLD AUTO: 33.6 % (ref 40–54)
HGB BLD-MCNC: 10.8 G/DL (ref 14–18)
IMM GRANULOCYTES # BLD AUTO: 0.03 K/UL (ref 0–0.04)
IMM GRANULOCYTES NFR BLD AUTO: 0.4 % (ref 0–0.5)
LYMPHOCYTES # BLD AUTO: 3.2 K/UL (ref 1–4.8)
LYMPHOCYTES NFR BLD: 38.4 % (ref 18–48)
MCH RBC QN AUTO: 27.8 PG (ref 27–31)
MCHC RBC AUTO-ENTMCNC: 32.1 G/DL (ref 32–36)
MCV RBC AUTO: 87 FL (ref 82–98)
MONOCYTES # BLD AUTO: 0.8 K/UL (ref 0.3–1)
MONOCYTES NFR BLD: 10.1 % (ref 4–15)
NEUTROPHILS # BLD AUTO: 3.8 K/UL (ref 1.8–7.7)
NEUTROPHILS NFR BLD: 46 % (ref 38–73)
NRBC BLD-RTO: 0 /100 WBC
PLATELET # BLD AUTO: 209 K/UL (ref 150–350)
PMV BLD AUTO: 11.4 FL (ref 9.2–12.9)
POTASSIUM SERPL-SCNC: 5.4 MMOL/L (ref 3.5–5.1)
PROT SERPL-MCNC: 7.4 G/DL (ref 6–8.4)
RBC # BLD AUTO: 3.88 M/UL (ref 4.6–6.2)
SODIUM SERPL-SCNC: 141 MMOL/L (ref 136–145)
WBC # BLD AUTO: 8.22 K/UL (ref 3.9–12.7)

## 2020-02-28 PROCEDURE — 86140 C-REACTIVE PROTEIN: CPT

## 2020-02-28 PROCEDURE — 80053 COMPREHEN METABOLIC PANEL: CPT

## 2020-02-28 PROCEDURE — 85025 COMPLETE CBC W/AUTO DIFF WBC: CPT

## 2020-02-28 PROCEDURE — 36415 COLL VENOUS BLD VENIPUNCTURE: CPT

## 2020-02-28 PROCEDURE — 85651 RBC SED RATE NONAUTOMATED: CPT

## 2020-03-12 ENCOUNTER — LAB VISIT (OUTPATIENT)
Dept: LAB | Facility: HOSPITAL | Age: 65
End: 2020-03-12
Attending: INTERNAL MEDICINE
Payer: MEDICARE

## 2020-03-12 DIAGNOSIS — L89.523 STAGE III PRESSURE ULCER OF LEFT ANKLE: Primary | ICD-10-CM

## 2020-03-12 DIAGNOSIS — N18.6 TYPE 2 DIABETES MELLITUS WITH END-STAGE RENAL DISEASE: ICD-10-CM

## 2020-03-12 DIAGNOSIS — E11.22 TYPE 2 DIABETES MELLITUS WITH END-STAGE RENAL DISEASE: ICD-10-CM

## 2020-03-12 LAB — HGB BLD-MCNC: 10.7 G/DL (ref 14–18)

## 2020-03-12 PROCEDURE — 85018 HEMOGLOBIN: CPT

## 2020-03-12 PROCEDURE — 83036 HEMOGLOBIN GLYCOSYLATED A1C: CPT

## 2020-03-13 LAB
ESTIMATED AVG GLUCOSE: 163 MG/DL (ref 68–131)
HBA1C MFR BLD HPLC: 7.3 % (ref 4–5.6)

## 2020-04-29 RX ORDER — FEBUXOSTAT 40 MG/1
TABLET, FILM COATED ORAL
Qty: 30 TABLET | Refills: 11 | Status: SHIPPED | OUTPATIENT
Start: 2020-04-29 | End: 2020-05-28

## 2020-05-05 ENCOUNTER — TELEPHONE (OUTPATIENT)
Dept: INTERNAL MEDICINE | Facility: CLINIC | Age: 65
End: 2020-05-05

## 2020-05-05 NOTE — TELEPHONE ENCOUNTER
I would not test just based on those symptoms. Who was he in contact with and for what duration and under what circumstances.

## 2020-05-05 NOTE — TELEPHONE ENCOUNTER
Called and spoken with pt, pt states he was sweating last night, did not check temperature, but states he felt he was running a fever, he did check temperature this morning, it was 97.8 . No sore throat, cough, pt would like to be test because he has come in contact with someone who has tested positive

## 2020-05-05 NOTE — TELEPHONE ENCOUNTER
Pt was in contact with step daughter whom lives with him, Pt's wife was tested and results were negative.

## 2020-05-05 NOTE — TELEPHONE ENCOUNTER
----- Message from Felisha Puri sent at 5/5/2020 12:47 PM CDT -----  Contact: Patient  Patient requesting an order for a COVID test, step daughter tested positive for it, please call him back at [phone]. Thank you

## 2020-05-20 DIAGNOSIS — R50.9 FEVER, UNSPECIFIED FEVER CAUSE: Primary | ICD-10-CM

## 2020-05-28 RX ORDER — FEBUXOSTAT 40 MG/1
TABLET, FILM COATED ORAL
Qty: 30 TABLET | Refills: 11 | Status: SHIPPED | OUTPATIENT
Start: 2020-05-28 | End: 2021-07-16 | Stop reason: SDUPTHER

## 2020-06-16 ENCOUNTER — OFFICE VISIT (OUTPATIENT)
Dept: INTERNAL MEDICINE | Facility: CLINIC | Age: 65
End: 2020-06-16
Payer: MEDICARE

## 2020-06-16 VITALS
WEIGHT: 184.06 LBS | TEMPERATURE: 99 F | BODY MASS INDEX: 24.93 KG/M2 | SYSTOLIC BLOOD PRESSURE: 124 MMHG | DIASTOLIC BLOOD PRESSURE: 68 MMHG | OXYGEN SATURATION: 98 % | HEART RATE: 81 BPM | HEIGHT: 72 IN

## 2020-06-16 DIAGNOSIS — M54.9 UPPER BACK PAIN ON RIGHT SIDE: Primary | ICD-10-CM

## 2020-06-16 PROCEDURE — 99213 PR OFFICE/OUTPT VISIT, EST, LEVL III, 20-29 MIN: ICD-10-PCS | Mod: S$PBB,,, | Performed by: FAMILY MEDICINE

## 2020-06-16 PROCEDURE — 99999 PR PBB SHADOW E&M-EST. PATIENT-LVL V: CPT | Mod: PBBFAC,,, | Performed by: FAMILY MEDICINE

## 2020-06-16 PROCEDURE — 99213 OFFICE O/P EST LOW 20 MIN: CPT | Mod: S$PBB,,, | Performed by: FAMILY MEDICINE

## 2020-06-16 PROCEDURE — 99999 PR PBB SHADOW E&M-EST. PATIENT-LVL V: ICD-10-PCS | Mod: PBBFAC,,, | Performed by: FAMILY MEDICINE

## 2020-06-16 PROCEDURE — 99215 OFFICE O/P EST HI 40 MIN: CPT | Mod: PBBFAC,PO | Performed by: FAMILY MEDICINE

## 2020-06-16 RX ORDER — TIZANIDINE 2 MG/1
TABLET ORAL
Qty: 60 TABLET | Refills: 1 | Status: SHIPPED | OUTPATIENT
Start: 2020-06-16 | End: 2020-07-07 | Stop reason: SDUPTHER

## 2020-06-16 RX ORDER — TRAMADOL HYDROCHLORIDE 50 MG/1
TABLET ORAL
Qty: 20 TABLET | Refills: 0 | Status: SHIPPED | OUTPATIENT
Start: 2020-06-16 | End: 2021-09-28 | Stop reason: SDUPTHER

## 2020-06-16 RX ORDER — GABAPENTIN 300 MG/1
CAPSULE ORAL
Qty: 60 CAPSULE | Refills: 1 | Status: SHIPPED | OUTPATIENT
Start: 2020-06-16 | End: 2020-07-29 | Stop reason: SDUPTHER

## 2020-06-16 NOTE — PROGRESS NOTES
Subjective:      Patient ID: Hossein Will is a 64 y.o. male.    Chief Complaint: Shoulder Pain      Patient reports continued pain to right upper back.  He was seen for the same complaint by me in February of this year.  He reports no improvement since that time although he says the medications prescribed at that time did help.  He was unable to attend physical therapy because of the corona virus quarantine    Review of Systems   Musculoskeletal: Positive for arthralgias and back pain.     Past Medical History:   Diagnosis Date    Bipolar 1 disorder     CKD stage 3 due to type 2 diabetes mellitus     Controlled diabetes mellitus with diabetic polyneuropathy     DM (diabetes mellitus) with complications     Elevated PSA     History of colon polyps     Hyperlipidemia associated with type 2 diabetes mellitus     Hypertension associated with stage 3 chronic kidney disease due to type 2 diabetes mellitus     Hyperuricemia     Knee effusion, left     Pacemaker     Type II diabetes mellitus with renal manifestations           Past Surgical History:   Procedure Laterality Date    HERNIA REPAIR      KNEE SURGERY      TRANSURETHRAL RESECTION OF PROSTATE       History reviewed. No pertinent family history.  Social History     Socioeconomic History    Marital status:      Spouse name: Not on file    Number of children: Not on file    Years of education: Not on file    Highest education level: Not on file   Occupational History    Not on file   Social Needs    Financial resource strain: Not on file    Food insecurity     Worry: Not on file     Inability: Not on file    Transportation needs     Medical: Not on file     Non-medical: Not on file   Tobacco Use    Smoking status: Never Smoker    Smokeless tobacco: Never Used   Substance and Sexual Activity    Alcohol use: No    Drug use: No    Sexual activity: Never   Lifestyle    Physical activity     Days per week: Not on file     Minutes per  session: Not on file    Stress: Only a little   Relationships    Social connections     Talks on phone: Not on file     Gets together: Not on file     Attends Episcopal service: Not on file     Active member of club or organization: Not on file     Attends meetings of clubs or organizations: Not on file     Relationship status: Not on file   Other Topics Concern    Not on file   Social History Narrative    Not on file     Review of patient's allergies indicates:   Allergen Reactions    Abilify [aripiprazole] Other (See Comments) and Anaphylaxis     Caused coma       Objective:       /68 (BP Location: Right arm)   Pulse 81   Temp 98.8 °F (37.1 °C) (Tympanic)   Ht 6' (1.829 m)   Wt 83.5 kg (184 lb 1.4 oz)   SpO2 98%   BMI 24.97 kg/m²   Physical Exam  Vitals signs reviewed.   Constitutional:       General: He is not in acute distress.     Appearance: Normal appearance. He is well-developed. He is not ill-appearing or diaphoretic.   Musculoskeletal:         General: Tenderness (Right scapular area) present. No swelling or deformity.      Comments: Normal range of motion to right shoulder but painful.  Audible popping sound with abduction of right shoulder.  Tenderness to palpation right scapula, no tenderness to palpations cervical or thoracic spine   Neurological:      Mental Status: He is alert and oriented to person, place, and time.   Psychiatric:         Behavior: Behavior normal.         Thought Content: Thought content normal.         Judgment: Judgment normal.       Assessment:     1. Upper back pain on right side      Plan:   Upper back pain on right side  -     Ambulatory referral/consult to Physical/Occupational Therapy; Future; Expected date: 06/23/2020    Other orders  -     traMADoL (ULTRAM) 50 mg tablet; tramadol 50 mg tablet every 8 hours as needed.  Dispense: 20 tablet; Refill: 0  -     tiZANidine (ZANAFLEX) 2 MG tablet; TAKE 2 TABLETS(4 MG) BY MOUTH EVERY 8 HOURS AS NEEDED FOR MUSCLE  "PAIN  Dispense: 60 tablet; Refill: 1  -     gabapentin (NEURONTIN) 300 MG capsule; TAKE 1 CAPSULE(300 MG) BY MOUTH TID  Dispense: 60 capsule; Refill: 1     Will resend referral for central physical therapy  Medication List with Changes/Refills   Current Medications    ASCORBIC ACID, VITAMIN C, (VITAMIN C) 500 MG TABLET    Take 500 mg by mouth.    ASPIRIN (ECOTRIN) 81 MG EC TABLET    Take 81 mg by mouth once daily.    BD INSULIN PEN NEEDLE UF MINI 31 GAUGE X 3/16" NDLE    USE TWICE D    BD ULTRA-FINE VINCE PEN NEEDLES 32 GAUGE X 5/32" NDLE    USE ONCE D WITH INSULIN    CLONAZEPAM (KLONOPIN) 1 MG TABLET    TK 1 T PO  Q HS    DICLOFENAC SODIUM (VOLTAREN) 1 % GEL    Apply 2 g topically 2 (two) times daily.    DIVALPROEX (DEPAKOTE) 500 MG TBEC    Take 2 tablets by mouth nightly.    FEBUXOSTAT (ULORIC) 40 MG TAB    TAKE 1 TABLET(40 MG) BY MOUTH EVERY DAY    FREESTYLE LANCETS 28 GAUGE LANCETS    1 each by Other route once daily.    FREESTYLE LITE STRIPS STRP    Take 2 tablets by mouth nightly.    HYDROCORTISONE 2.5 % CREAM    Apply topically 2 (two) times daily.    INSULIN ASPART U-100 (NOVOLOG FLEXPEN U-100 INSULIN) 100 UNIT/ML (3 ML) INPN PEN    Inject 20 Units into the skin 3 (three) times daily with meals.    LANTUS SOLOSTAR U-100 INSULIN GLARGINE 100 UNITS/ML (3ML) SUBQ PEN    Inject 42 Units into the skin 2 (two) times daily.    LATANOPROST 0.005 % OPHTHALMIC SOLUTION    INT 1 GTT IN OU HS    LIDOCAINE-PRILOCAINE (EMLA) CREAM        METOPROLOL TARTRATE (LOPRESSOR) 25 MG TABLET    Take 1 tablet (25 mg total) by mouth daily as needed.    PEN NEEDLE, DIABETIC (BD ULTRA-FINE VINCE PEN NEEDLE) 32 GAUGE X 5/32" NDLE    Use with insulin shots    PREDNISOLONE ACETATE (PRED FORTE) 1 % DRPS    INSTILL ONE DROP IN OU BID AS NEEDED    QUETIAPINE (SEROQUEL) 400 MG TABLET    Take 400 mg by mouth nightly.    ROSUVASTATIN (CRESTOR) 20 MG TABLET    Take 1 tablet (20 mg total) by mouth once daily.    SITAGLIPTIN (JANUVIA) 50 MG TAB    " Take 1 tablet (50 mg total) by mouth once daily.    TAMSULOSIN (FLOMAX) 0.4 MG CP24    TK 1 C PO QD   Changed and/or Refilled Medications    Modified Medication Previous Medication    GABAPENTIN (NEURONTIN) 300 MG CAPSULE gabapentin (NEURONTIN) 300 MG capsule       TAKE 1 CAPSULE(300 MG) BY MOUTH TID    TAKE 1 CAPSULE(300 MG) BY MOUTH EVERY EVENING    TIZANIDINE (ZANAFLEX) 2 MG TABLET tiZANidine (ZANAFLEX) 2 MG tablet       TAKE 2 TABLETS(4 MG) BY MOUTH EVERY 8 HOURS AS NEEDED FOR MUSCLE PAIN    TAKE 2 TABLETS(4 MG) BY MOUTH EVERY 8 HOURS AS NEEDED FOR MUSCLE PAIN    TRAMADOL (ULTRAM) 50 MG TABLET traMADol (ULTRAM) 50 mg tablet       tramadol 50 mg tablet every 8 hours as needed.    tramadol 50 mg tablet every 8 hours as needed.

## 2020-07-07 RX ORDER — TIZANIDINE 2 MG/1
TABLET ORAL
Qty: 60 TABLET | Refills: 1 | Status: SHIPPED | OUTPATIENT
Start: 2020-07-07 | End: 2020-07-29 | Stop reason: SDUPTHER

## 2020-07-16 ENCOUNTER — LAB VISIT (OUTPATIENT)
Dept: LAB | Facility: HOSPITAL | Age: 65
End: 2020-07-16
Attending: INTERNAL MEDICINE
Payer: MEDICARE

## 2020-07-16 DIAGNOSIS — N18.30 TYPE 2 DIABETES MELLITUS WITH STAGE 3 CHRONIC KIDNEY DISEASE, WITHOUT LONG-TERM CURRENT USE OF INSULIN: ICD-10-CM

## 2020-07-16 DIAGNOSIS — E79.0 HYPERURICEMIA: ICD-10-CM

## 2020-07-16 DIAGNOSIS — E11.22 TYPE 2 DIABETES MELLITUS WITH STAGE 3 CHRONIC KIDNEY DISEASE, WITHOUT LONG-TERM CURRENT USE OF INSULIN: ICD-10-CM

## 2020-07-16 LAB
ANION GAP SERPL CALC-SCNC: 7 MMOL/L (ref 8–16)
BASOPHILS # BLD AUTO: 0.07 K/UL (ref 0–0.2)
BASOPHILS NFR BLD: 0.9 % (ref 0–1.9)
BUN SERPL-MCNC: 30 MG/DL (ref 8–23)
CALCIUM SERPL-MCNC: 9.9 MG/DL (ref 8.7–10.5)
CHLORIDE SERPL-SCNC: 105 MMOL/L (ref 95–110)
CO2 SERPL-SCNC: 26 MMOL/L (ref 23–29)
CREAT SERPL-MCNC: 2.5 MG/DL (ref 0.5–1.4)
DIFFERENTIAL METHOD: ABNORMAL
EOSINOPHIL # BLD AUTO: 0.8 K/UL (ref 0–0.5)
EOSINOPHIL NFR BLD: 10.3 % (ref 0–8)
ERYTHROCYTE [DISTWIDTH] IN BLOOD BY AUTOMATED COUNT: 16.4 % (ref 11.5–14.5)
EST. GFR  (AFRICAN AMERICAN): 29.8 ML/MIN/1.73 M^2
EST. GFR  (NON AFRICAN AMERICAN): 25.8 ML/MIN/1.73 M^2
GLUCOSE SERPL-MCNC: 91 MG/DL (ref 70–110)
HCT VFR BLD AUTO: 33.8 % (ref 40–54)
HGB BLD-MCNC: 10.7 G/DL (ref 14–18)
IMM GRANULOCYTES # BLD AUTO: 0.02 K/UL (ref 0–0.04)
IMM GRANULOCYTES NFR BLD AUTO: 0.3 % (ref 0–0.5)
LYMPHOCYTES # BLD AUTO: 3.6 K/UL (ref 1–4.8)
LYMPHOCYTES NFR BLD: 47.2 % (ref 18–48)
MCH RBC QN AUTO: 27.7 PG (ref 27–31)
MCHC RBC AUTO-ENTMCNC: 31.7 G/DL (ref 32–36)
MCV RBC AUTO: 88 FL (ref 82–98)
MONOCYTES # BLD AUTO: 0.7 K/UL (ref 0.3–1)
MONOCYTES NFR BLD: 9.1 % (ref 4–15)
NEUTROPHILS # BLD AUTO: 2.5 K/UL (ref 1.8–7.7)
NEUTROPHILS NFR BLD: 32.2 % (ref 38–73)
NRBC BLD-RTO: 0 /100 WBC
PLATELET # BLD AUTO: 184 K/UL (ref 150–350)
PMV BLD AUTO: 12.8 FL (ref 9.2–12.9)
POTASSIUM SERPL-SCNC: 4.6 MMOL/L (ref 3.5–5.1)
RBC # BLD AUTO: 3.86 M/UL (ref 4.6–6.2)
SODIUM SERPL-SCNC: 138 MMOL/L (ref 136–145)
URATE SERPL-MCNC: 5 MG/DL (ref 3.4–7)
WBC # BLD AUTO: 7.6 K/UL (ref 3.9–12.7)

## 2020-07-16 PROCEDURE — 84550 ASSAY OF BLOOD/URIC ACID: CPT

## 2020-07-16 PROCEDURE — 83036 HEMOGLOBIN GLYCOSYLATED A1C: CPT

## 2020-07-16 PROCEDURE — 36415 COLL VENOUS BLD VENIPUNCTURE: CPT | Mod: PO

## 2020-07-16 PROCEDURE — 85025 COMPLETE CBC W/AUTO DIFF WBC: CPT

## 2020-07-16 PROCEDURE — 80048 BASIC METABOLIC PNL TOTAL CA: CPT

## 2020-07-17 ENCOUNTER — TELEPHONE (OUTPATIENT)
Dept: INTERNAL MEDICINE | Facility: CLINIC | Age: 65
End: 2020-07-17

## 2020-07-17 LAB
ESTIMATED AVG GLUCOSE: 114 MG/DL (ref 68–131)
HBA1C MFR BLD HPLC: 5.6 % (ref 4–5.6)

## 2020-07-17 NOTE — TELEPHONE ENCOUNTER
----- Message from Seun Chamberlain MD sent at 7/17/2020  8:21 AM CDT -----  Needs EPP apt in August

## 2020-07-29 RX ORDER — GABAPENTIN 300 MG/1
CAPSULE ORAL
Qty: 60 CAPSULE | Refills: 1 | Status: SHIPPED | OUTPATIENT
Start: 2020-07-29 | End: 2020-09-02 | Stop reason: SDUPTHER

## 2020-07-29 RX ORDER — TIZANIDINE 2 MG/1
TABLET ORAL
Qty: 60 TABLET | Refills: 1 | Status: SHIPPED | OUTPATIENT
Start: 2020-07-29 | End: 2020-07-29 | Stop reason: SDUPTHER

## 2020-07-29 RX ORDER — TIZANIDINE 2 MG/1
TABLET ORAL
Qty: 60 TABLET | Refills: 1 | Status: SHIPPED | OUTPATIENT
Start: 2020-07-29 | End: 2020-08-26

## 2020-08-25 RX ORDER — METOPROLOL TARTRATE 25 MG/1
25 TABLET, FILM COATED ORAL DAILY PRN
Qty: 30 TABLET | Refills: 11 | Status: SHIPPED | OUTPATIENT
Start: 2020-08-25 | End: 2021-08-27

## 2020-08-25 NOTE — TELEPHONE ENCOUNTER
I have no way to put in such an order for psych care. He has a psychiatrist that he sees. They should contact his psychiatrist for his/her recommendations. If they think he is a threat to himself or others they should call 911 and have him taken to the ER.

## 2020-08-25 NOTE — TELEPHONE ENCOUNTER
Spoke with [facility] and they stated that the patient is refusing to be seen and he has really acting out to the point they wanted to PEC him. He is home alone right now. They think that he's not taking his medication. The are asking for an order to get him psych treatment with [facility]. Please Advise

## 2020-08-25 NOTE — TELEPHONE ENCOUNTER
----- Message from Ewa Skelton sent at 8/25/2020 11:28 AM CDT -----  Regarding: please call  Contact: Francoise with [facility]  Pt is having a psychiatric episode. Needs to speak with office.  [phone]

## 2020-08-26 ENCOUNTER — OFFICE VISIT (OUTPATIENT)
Dept: INTERNAL MEDICINE | Facility: CLINIC | Age: 65
End: 2020-08-26
Payer: MEDICARE

## 2020-08-26 VITALS
HEART RATE: 68 BPM | SYSTOLIC BLOOD PRESSURE: 100 MMHG | BODY MASS INDEX: 24.42 KG/M2 | HEIGHT: 72 IN | DIASTOLIC BLOOD PRESSURE: 60 MMHG | OXYGEN SATURATION: 98 % | TEMPERATURE: 98 F | WEIGHT: 180.31 LBS

## 2020-08-26 DIAGNOSIS — E11.22 TYPE 2 DIABETES MELLITUS WITH STAGE 3 CHRONIC KIDNEY DISEASE, WITHOUT LONG-TERM CURRENT USE OF INSULIN: ICD-10-CM

## 2020-08-26 DIAGNOSIS — N18.30 HYPERTENSION ASSOCIATED WITH STAGE 3 CHRONIC KIDNEY DISEASE DUE TO TYPE 2 DIABETES MELLITUS: Primary | ICD-10-CM

## 2020-08-26 DIAGNOSIS — F31.9 BIPOLAR 1 DISORDER: ICD-10-CM

## 2020-08-26 DIAGNOSIS — M17.0 PRIMARY OSTEOARTHRITIS OF BOTH KNEES: ICD-10-CM

## 2020-08-26 DIAGNOSIS — E11.69 HYPERLIPIDEMIA ASSOCIATED WITH TYPE 2 DIABETES MELLITUS: ICD-10-CM

## 2020-08-26 DIAGNOSIS — E11.22 HYPERTENSION ASSOCIATED WITH STAGE 3 CHRONIC KIDNEY DISEASE DUE TO TYPE 2 DIABETES MELLITUS: Primary | ICD-10-CM

## 2020-08-26 DIAGNOSIS — N18.30 TYPE 2 DIABETES MELLITUS WITH STAGE 3 CHRONIC KIDNEY DISEASE, WITHOUT LONG-TERM CURRENT USE OF INSULIN: ICD-10-CM

## 2020-08-26 DIAGNOSIS — E11.42 CONTROLLED TYPE 2 DIABETES MELLITUS WITH DIABETIC POLYNEUROPATHY, WITHOUT LONG-TERM CURRENT USE OF INSULIN: ICD-10-CM

## 2020-08-26 DIAGNOSIS — N18.30 CKD STAGE 3 DUE TO TYPE 2 DIABETES MELLITUS: ICD-10-CM

## 2020-08-26 DIAGNOSIS — E79.0 HYPERURICEMIA: ICD-10-CM

## 2020-08-26 DIAGNOSIS — E11.8 DM (DIABETES MELLITUS) WITH COMPLICATIONS: ICD-10-CM

## 2020-08-26 DIAGNOSIS — E11.22 CKD STAGE 3 DUE TO TYPE 2 DIABETES MELLITUS: ICD-10-CM

## 2020-08-26 DIAGNOSIS — E78.5 HYPERLIPIDEMIA ASSOCIATED WITH TYPE 2 DIABETES MELLITUS: ICD-10-CM

## 2020-08-26 DIAGNOSIS — Z86.010 HISTORY OF COLON POLYPS: ICD-10-CM

## 2020-08-26 DIAGNOSIS — I12.9 HYPERTENSION ASSOCIATED WITH STAGE 3 CHRONIC KIDNEY DISEASE DUE TO TYPE 2 DIABETES MELLITUS: Primary | ICD-10-CM

## 2020-08-26 PROCEDURE — 99999 PR PBB SHADOW E&M-EST. PATIENT-LVL V: ICD-10-PCS | Mod: PBBFAC,,, | Performed by: INTERNAL MEDICINE

## 2020-08-26 PROCEDURE — 99999 PR PBB SHADOW E&M-EST. PATIENT-LVL V: CPT | Mod: PBBFAC,,, | Performed by: INTERNAL MEDICINE

## 2020-08-26 PROCEDURE — 99215 OFFICE O/P EST HI 40 MIN: CPT | Mod: PBBFAC,PO | Performed by: INTERNAL MEDICINE

## 2020-08-26 PROCEDURE — 99214 PR OFFICE/OUTPT VISIT, EST, LEVL IV, 30-39 MIN: ICD-10-PCS | Mod: S$PBB,,, | Performed by: INTERNAL MEDICINE

## 2020-08-26 PROCEDURE — 99214 OFFICE O/P EST MOD 30 MIN: CPT | Mod: S$PBB,,, | Performed by: INTERNAL MEDICINE

## 2020-08-26 NOTE — PROGRESS NOTES
HPI:  Patient is a 64-year-old man who comes today for follow-up of diabetes, hypertension, lipids, chronic kidney disease, chronic anemia and for his annual physical exam.  Patient again is  from his wife with his of sick causing quite a bit of disruption in his life.  Patient states his blood sugars have been doing fine.  He denies any hypoglycemia.  He saw his kidney doctor last month and was told everything was stable.  There were no changes May.  He continues to see home health for a small ulcer on his left ankle.  It has been healing nicely.  He states his blood pressures been well controlled.  He has had no flares of gout.  He states he sees a psychiatrist on a regular basis.    Current MEDS: medcard review, verified and update  Allergies: Per the electronic medical record    Past Medical History:   Diagnosis Date    Bipolar 1 disorder     CKD stage 3 due to type 2 diabetes mellitus     Controlled diabetes mellitus with diabetic polyneuropathy     DM (diabetes mellitus) with complications     Elevated PSA     History of colon polyps     Hyperlipidemia associated with type 2 diabetes mellitus     Hypertension associated with stage 3 chronic kidney disease due to type 2 diabetes mellitus     Hyperuricemia     Knee effusion, left     Pacemaker     Type II diabetes mellitus with renal manifestations        Past Surgical History:   Procedure Laterality Date    HERNIA REPAIR      KNEE SURGERY      TRANSURETHRAL RESECTION OF PROSTATE         SHx: per the electronic medical record    FHx: recorded in the electronic medical record    ROS:    denies any chest pains or shortness of breath. Denies any nausea, vomiting or diarrhea. Denies any fever, chills or sweats. Denies any change in weight, voice, stool, skin or hair. Denies any dysuria, dyspepsia or dysphagia. Denies any change in vision, hearing or headaches. Denies any swollen lymph nodes or loss of memory.    PE:  There were no vitals  taken for this visit.  Gen: Well-developed, well-nourished, male, in no acute distress, oriented x3  HEENT: neck is supple, no adenopathy, carotids 2+ equal without bruits, thyroid exam normal size without nodules.  CHEST: clear to auscultation and percussion  CVS: regular rate and rhythm without significant murmur, gallop, or rubs  ABD: soft, benign, no rebound no guarding, no distention.  Bowel sounds are normal.     nontender.  No palpable masses.  No organomegaly and no audible bruits.  RECTAL:  Deferred  EXT: no clubbing, cyanosis, or edema  LYMPH: no cervical, inguinal, or axillary adenopathy  FEET:  Decreased sensation to monofilament testing.    NEURO:   Cranial nerves II- XII intact. No nystagmus.  Speech normal.   Gross motor and sensory unremarkable.    Lab Results   Component Value Date    WBC 7.60 07/16/2020    HGB 10.7 (L) 07/16/2020    HCT 33.8 (L) 07/16/2020     07/16/2020    CHOL 123 12/26/2019    TRIG 122 12/26/2019    HDL 44 12/26/2019    ALT 13 02/28/2020    AST 22 02/28/2020     07/16/2020    K 4.6 07/16/2020     07/16/2020    CREATININE 2.5 (H) 07/16/2020    BUN 30 (H) 07/16/2020    CO2 26 07/16/2020    TSH 1.485 12/26/2019    PSA 5.8 (H) 03/05/2018    HGBA1C 5.6 07/16/2020       Impression:  Multiple medical problems below, stable  Patient Active Problem List   Diagnosis    DM (diabetes mellitus) with complications    Type II diabetes mellitus with renal manifestations    Hyperlipidemia associated with type 2 diabetes mellitus    Bipolar 1 disorder    CKD stage 3 due to type 2 diabetes mellitus    Hyperuricemia    Knee effusion, left    Elevated PSA    Controlled diabetes mellitus with diabetic polyneuropathy    Pacemaker    Chronic pain of left knee    Complete tear of medial collateral ligament of right knee    Cervical spondylosis with myelopathy and radiculopathy    Lumbosacral spondylosis with radiculopathy    History of colon polyps    Primary  osteoarthritis of both knees    Ulcer of left foot    Hypertension associated with stage 3 chronic kidney disease due to type 2 diabetes mellitus       Plan:   Orders Placed This Encounter    Hemoglobin A1C    Comprehensive metabolic panel    Lipid Panel    Protein / creatinine ratio, urine    TSH    CBC auto differential     Patient will see me again in 6 months.  He needs to follow-up with his psychiatrist on a regular basis.  He needs to continue to be seen by the nephrologist as well.  He will see me otherwise as needed.  He will have above lab work done in 6 months  This note is generated with speech recognition software and is subject to transcription error and sound alike phrases that may be missed by proofreading.

## 2020-08-28 ENCOUNTER — TELEPHONE (OUTPATIENT)
Dept: INTERNAL MEDICINE | Facility: CLINIC | Age: 65
End: 2020-08-28

## 2020-08-28 NOTE — TELEPHONE ENCOUNTER
----- Message from Jamel Jurado sent at 8/28/2020  2:00 PM CDT -----  Regarding: Nani with [facility]  Please call Nani as she needs to provide an update on this patient [phone].

## 2020-08-31 ENCOUNTER — TELEPHONE (OUTPATIENT)
Dept: INTERNAL MEDICINE | Facility: CLINIC | Age: 65
End: 2020-08-31

## 2020-08-31 NOTE — TELEPHONE ENCOUNTER
----- Message from Lena Bosch sent at 8/31/2020  8:56 AM CDT -----  Home health called to speak with the office about pt discharge please reach out to laila at [phone]

## 2020-08-31 NOTE — TELEPHONE ENCOUNTER
FYI    [facility] calling to state that the patient has been d/c from their care. They are unable to meet the patient's needsFYI

## 2020-09-02 ENCOUNTER — OFFICE VISIT (OUTPATIENT)
Dept: INTERNAL MEDICINE | Facility: CLINIC | Age: 65
End: 2020-09-02
Payer: MEDICARE

## 2020-09-02 VITALS
HEIGHT: 72 IN | WEIGHT: 178.56 LBS | HEART RATE: 72 BPM | SYSTOLIC BLOOD PRESSURE: 98 MMHG | OXYGEN SATURATION: 97 % | DIASTOLIC BLOOD PRESSURE: 66 MMHG | BODY MASS INDEX: 24.18 KG/M2 | TEMPERATURE: 99 F

## 2020-09-02 DIAGNOSIS — M25.511 CHRONIC RIGHT SHOULDER PAIN: Primary | ICD-10-CM

## 2020-09-02 DIAGNOSIS — G89.29 CHRONIC RIGHT SHOULDER PAIN: Primary | ICD-10-CM

## 2020-09-02 PROCEDURE — 99213 OFFICE O/P EST LOW 20 MIN: CPT | Mod: S$PBB,,, | Performed by: FAMILY MEDICINE

## 2020-09-02 PROCEDURE — 99999 PR PBB SHADOW E&M-EST. PATIENT-LVL V: CPT | Mod: PBBFAC,,, | Performed by: FAMILY MEDICINE

## 2020-09-02 PROCEDURE — 99213 PR OFFICE/OUTPT VISIT, EST, LEVL III, 20-29 MIN: ICD-10-PCS | Mod: S$PBB,,, | Performed by: FAMILY MEDICINE

## 2020-09-02 PROCEDURE — 99999 PR PBB SHADOW E&M-EST. PATIENT-LVL V: ICD-10-PCS | Mod: PBBFAC,,, | Performed by: FAMILY MEDICINE

## 2020-09-02 PROCEDURE — 99215 OFFICE O/P EST HI 40 MIN: CPT | Mod: PBBFAC,PO | Performed by: FAMILY MEDICINE

## 2020-09-02 RX ORDER — GABAPENTIN 300 MG/1
CAPSULE ORAL
Qty: 60 CAPSULE | Refills: 5 | Status: SHIPPED | OUTPATIENT
Start: 2020-09-02 | End: 2020-09-09 | Stop reason: SDUPTHER

## 2020-09-02 RX ORDER — TIZANIDINE 2 MG/1
TABLET ORAL
Qty: 60 TABLET | Refills: 5 | Status: SHIPPED | OUTPATIENT
Start: 2020-09-02 | End: 2021-09-28 | Stop reason: SDUPTHER

## 2020-09-03 ENCOUNTER — TELEPHONE (OUTPATIENT)
Dept: INTERNAL MEDICINE | Facility: CLINIC | Age: 65
End: 2020-09-03

## 2020-09-03 NOTE — TELEPHONE ENCOUNTER
----- Message from Carlota Donato MD sent at 9/3/2020  8:39 AM CDT -----  Please print out PT referral so I can sign. thanks

## 2020-09-03 NOTE — PROGRESS NOTES
Subjective:      Patient ID: Hossein Will is a 64 y.o. male.    Chief Complaint: Shoulder Pain      Patient reports continued posterior right shoulder pain. He has been seen by me for this in the past, reports it is unchanged with no recent injury or trauma to the area. He was going to PT with good results but had to stop secondary to coronavirus pandemic, would like to resume this. Also requesting tizanidine and gabapentin refills, this does help and allow him to sleep at night     Review of Systems   Musculoskeletal: Positive for arthralgias and joint swelling.     Past Medical History:   Diagnosis Date    Bipolar 1 disorder     CKD stage 3 due to type 2 diabetes mellitus     Controlled diabetes mellitus with diabetic polyneuropathy     DM (diabetes mellitus) with complications     Elevated PSA     History of colon polyps     Hyperlipidemia associated with type 2 diabetes mellitus     Hypertension associated with stage 3 chronic kidney disease due to type 2 diabetes mellitus     Hyperuricemia     Knee effusion, left     Pacemaker     Type II diabetes mellitus with renal manifestations           Past Surgical History:   Procedure Laterality Date    HERNIA REPAIR      KNEE SURGERY      TRANSURETHRAL RESECTION OF PROSTATE       History reviewed. No pertinent family history.  Social History     Socioeconomic History    Marital status:      Spouse name: Not on file    Number of children: Not on file    Years of education: Not on file    Highest education level: Not on file   Occupational History    Not on file   Social Needs    Financial resource strain: Not on file    Food insecurity     Worry: Not on file     Inability: Not on file    Transportation needs     Medical: Not on file     Non-medical: Not on file   Tobacco Use    Smoking status: Never Smoker    Smokeless tobacco: Never Used   Substance and Sexual Activity    Alcohol use: No    Drug use: No    Sexual activity: Never    Lifestyle    Physical activity     Days per week: Not on file     Minutes per session: Not on file    Stress: Only a little   Relationships    Social connections     Talks on phone: Not on file     Gets together: Not on file     Attends Episcopal service: Not on file     Active member of club or organization: Not on file     Attends meetings of clubs or organizations: Not on file     Relationship status: Not on file   Other Topics Concern    Not on file   Social History Narrative    Not on file     Review of patient's allergies indicates:   Allergen Reactions    Abilify [aripiprazole] Other (See Comments) and Anaphylaxis     Caused coma       Objective:       BP 98/66 (BP Location: Right arm)   Pulse 72   Temp 98.8 °F (37.1 °C) (Tympanic)   Ht 6' (1.829 m)   Wt 81 kg (178 lb 9.2 oz)   SpO2 97%   BMI 24.22 kg/m²   Physical Exam  Constitutional:       General: He is not in acute distress.     Appearance: Normal appearance. He is well-developed. He is not ill-appearing or diaphoretic.   Musculoskeletal: Normal range of motion.         General: Tenderness (posterior right shoulder) present. No swelling or deformity.   Neurological:      General: No focal deficit present.      Mental Status: He is alert and oriented to person, place, and time.       Assessment:     1. Chronic right shoulder pain      Plan:   Chronic right shoulder pain  -     Ambulatory referral/consult to Physical/Occupational Therapy; Future; Expected date: 09/09/2020    Other orders  -     tiZANidine (ZANAFLEX) 2 MG tablet; Take 2 tablets qhs  Dispense: 60 tablet; Refill: 5  -     gabapentin (NEURONTIN) 300 MG capsule; TAKE 2 CAPSULE(300 MG) BY MOUTH QHS  Dispense: 60 capsule; Refill: 5      Medication List with Changes/Refills   Current Medications    ASCORBIC ACID, VITAMIN C, (VITAMIN C) 500 MG TABLET    Take 500 mg by mouth.    ASPIRIN (ECOTRIN) 81 MG EC TABLET    Take 81 mg by mouth once daily.    BD INSULIN PEN NEEDLE UF MINI 31  "GAUGE X 3/16" NDLE    USE TWICE D    BD ULTRA-FINE VINCE PEN NEEDLES 32 GAUGE X 5/32" NDLE    USE ONCE D WITH INSULIN    CLONAZEPAM (KLONOPIN) 1 MG TABLET    TK 1 T PO  Q HS    DICLOFENAC SODIUM (VOLTAREN) 1 % GEL    Apply 2 g topically 2 (two) times daily.    DIVALPROEX (DEPAKOTE) 500 MG TBEC    Take 2 tablets by mouth nightly.    FEBUXOSTAT (ULORIC) 40 MG TAB    TAKE 1 TABLET(40 MG) BY MOUTH EVERY DAY    FREESTYLE LANCETS 28 GAUGE LANCETS    1 each by Other route once daily.    FREESTYLE LITE STRIPS STRP    Take 2 tablets by mouth nightly.    HYDROCORTISONE 2.5 % CREAM    Apply topically 2 (two) times daily.    INSULIN ASPART U-100 (NOVOLOG FLEXPEN U-100 INSULIN) 100 UNIT/ML (3 ML) INPN PEN    Inject 20 Units into the skin 3 (three) times daily with meals.    LANTUS SOLOSTAR U-100 INSULIN GLARGINE 100 UNITS/ML (3ML) SUBQ PEN    Inject 42 Units into the skin 2 (two) times daily.    LATANOPROST 0.005 % OPHTHALMIC SOLUTION    INT 1 GTT IN OU HS    LIDOCAINE-PRILOCAINE (EMLA) CREAM        METOPROLOL TARTRATE (LOPRESSOR) 25 MG TABLET    Take 1 tablet (25 mg total) by mouth daily as needed.    PEN NEEDLE, DIABETIC (BD ULTRA-FINE VINCE PEN NEEDLE) 32 GAUGE X 5/32" NDLE    USE TWICE DAILY AS DIRECTED    PREDNISOLONE ACETATE (PRED FORTE) 1 % DRPS    INSTILL ONE DROP IN OU BID AS NEEDED    QUETIAPINE (SEROQUEL) 400 MG TABLET    Take 400 mg by mouth nightly.    ROSUVASTATIN (CRESTOR) 20 MG TABLET    Take 1 tablet (20 mg total) by mouth once daily.    SITAGLIPTIN (JANUVIA) 50 MG TAB    Take 1 tablet (50 mg total) by mouth once daily.    TAMSULOSIN (FLOMAX) 0.4 MG CP24    TK 1 C PO QD    TRAMADOL (ULTRAM) 50 MG TABLET    tramadol 50 mg tablet every 8 hours as needed.   Changed and/or Refilled Medications    Modified Medication Previous Medication    GABAPENTIN (NEURONTIN) 300 MG CAPSULE gabapentin (NEURONTIN) 300 MG capsule       TAKE 2 CAPSULE(300 MG) BY MOUTH QHS    TAKE 1 CAPSULE(300 MG) BY MOUTH TID    TIZANIDINE (ZANAFLEX) " 2 MG TABLET tiZANidine (ZANAFLEX) 2 MG tablet       Take 2 tablets qhs    TAKE 2 TABLETS(4 MG) BY MOUTH EVERY 8 HOURS AS NEEDED FOR MUSCLE PAIN

## 2020-09-04 ENCOUNTER — OFFICE VISIT (OUTPATIENT)
Dept: INTERNAL MEDICINE | Facility: CLINIC | Age: 65
End: 2020-09-04
Payer: MEDICARE

## 2020-09-04 VITALS
OXYGEN SATURATION: 98 % | BODY MASS INDEX: 24.18 KG/M2 | HEIGHT: 72 IN | DIASTOLIC BLOOD PRESSURE: 78 MMHG | TEMPERATURE: 99 F | WEIGHT: 178.56 LBS | SYSTOLIC BLOOD PRESSURE: 116 MMHG | HEART RATE: 104 BPM

## 2020-09-04 DIAGNOSIS — M79.89 LEG SWELLING: Primary | ICD-10-CM

## 2020-09-04 PROCEDURE — 99213 PR OFFICE/OUTPT VISIT, EST, LEVL III, 20-29 MIN: ICD-10-PCS | Mod: S$PBB,,, | Performed by: FAMILY MEDICINE

## 2020-09-04 PROCEDURE — 99215 OFFICE O/P EST HI 40 MIN: CPT | Mod: PBBFAC,PO | Performed by: FAMILY MEDICINE

## 2020-09-04 PROCEDURE — 99999 PR PBB SHADOW E&M-EST. PATIENT-LVL V: CPT | Mod: PBBFAC,,, | Performed by: FAMILY MEDICINE

## 2020-09-04 PROCEDURE — 99213 OFFICE O/P EST LOW 20 MIN: CPT | Mod: S$PBB,,, | Performed by: FAMILY MEDICINE

## 2020-09-04 PROCEDURE — 99999 PR PBB SHADOW E&M-EST. PATIENT-LVL V: ICD-10-PCS | Mod: PBBFAC,,, | Performed by: FAMILY MEDICINE

## 2020-09-04 RX ORDER — FUROSEMIDE 20 MG/1
20 TABLET ORAL DAILY PRN
Qty: 30 TABLET | Refills: 2 | Status: SHIPPED | OUTPATIENT
Start: 2020-09-04 | End: 2020-11-30 | Stop reason: SDUPTHER

## 2020-09-06 NOTE — PROGRESS NOTES
Subjective:      Patient ID: Hossein Will is a 64 y.o. male.    Chief Complaint: Leg Swelling      Patient complaining of continued swelling in LE. Requesting fluid pill.     Review of Systems   Constitutional: Negative for fatigue and fever.   Respiratory: Negative for cough and shortness of breath.    Cardiovascular: Positive for leg swelling. Negative for chest pain and palpitations.     Past Medical History:   Diagnosis Date    Bipolar 1 disorder     CKD stage 3 due to type 2 diabetes mellitus     Controlled diabetes mellitus with diabetic polyneuropathy     DM (diabetes mellitus) with complications     Elevated PSA     History of colon polyps     Hyperlipidemia associated with type 2 diabetes mellitus     Hypertension associated with stage 3 chronic kidney disease due to type 2 diabetes mellitus     Hyperuricemia     Knee effusion, left     Pacemaker     Type II diabetes mellitus with renal manifestations           Past Surgical History:   Procedure Laterality Date    HERNIA REPAIR      KNEE SURGERY      TRANSURETHRAL RESECTION OF PROSTATE       History reviewed. No pertinent family history.  Social History     Socioeconomic History    Marital status:      Spouse name: Not on file    Number of children: Not on file    Years of education: Not on file    Highest education level: Not on file   Occupational History    Not on file   Social Needs    Financial resource strain: Not on file    Food insecurity     Worry: Not on file     Inability: Not on file    Transportation needs     Medical: Not on file     Non-medical: Not on file   Tobacco Use    Smoking status: Never Smoker    Smokeless tobacco: Never Used   Substance and Sexual Activity    Alcohol use: No    Drug use: No    Sexual activity: Never   Lifestyle    Physical activity     Days per week: Not on file     Minutes per session: Not on file    Stress: Only a little   Relationships    Social connections     Talks on  phone: Not on file     Gets together: Not on file     Attends Roman Catholic service: Not on file     Active member of club or organization: Not on file     Attends meetings of clubs or organizations: Not on file     Relationship status: Not on file   Other Topics Concern    Not on file   Social History Narrative    Not on file     Review of patient's allergies indicates:   Allergen Reactions    Abilify [aripiprazole] Other (See Comments) and Anaphylaxis     Caused coma       Objective:       /78 (BP Location: Right arm)   Pulse 104   Temp 98.8 °F (37.1 °C) (Temporal)   Ht 6' (1.829 m)   Wt 81 kg (178 lb 9.2 oz)   SpO2 98%   BMI 24.22 kg/m²   Physical Exam  Constitutional:       General: He is not in acute distress.     Appearance: Normal appearance. He is not ill-appearing.   Cardiovascular:      Rate and Rhythm: Normal rate and regular rhythm.      Heart sounds: Normal heart sounds.   Pulmonary:      Effort: Pulmonary effort is normal. No respiratory distress.      Breath sounds: Normal breath sounds.   Musculoskeletal:      Right lower leg: Edema present.      Left lower leg: Edema present.      Comments: Trace edema bilaterally   Neurological:      Mental Status: He is alert.   Psychiatric:         Mood and Affect: Mood normal.         Behavior: Behavior normal.         Thought Content: Thought content normal.         Judgment: Judgment normal.       Assessment:     1. Leg swelling      Plan:   Leg swelling    Other orders  -     furosemide (LASIX) 20 MG tablet; Take 1 tablet (20 mg total) by mouth daily as needed.  Dispense: 30 tablet; Refill: 2      Medication List with Changes/Refills   New Medications    FUROSEMIDE (LASIX) 20 MG TABLET    Take 1 tablet (20 mg total) by mouth daily as needed.   Current Medications    ASCORBIC ACID, VITAMIN C, (VITAMIN C) 500 MG TABLET    Take 500 mg by mouth.    ASPIRIN (ECOTRIN) 81 MG EC TABLET    Take 81 mg by mouth once daily.    BD INSULIN PEN NEEDLE UF MINI 31  "GAUGE X 3/16" NDLE    USE TWICE D    BD ULTRA-FINE VINCE PEN NEEDLES 32 GAUGE X 5/32" NDLE    USE ONCE D WITH INSULIN    CLONAZEPAM (KLONOPIN) 1 MG TABLET    TK 1 T PO  Q HS    DICLOFENAC SODIUM (VOLTAREN) 1 % GEL    Apply 2 g topically 2 (two) times daily.    DIVALPROEX (DEPAKOTE) 500 MG TBEC    Take 2 tablets by mouth nightly.    FEBUXOSTAT (ULORIC) 40 MG TAB    TAKE 1 TABLET(40 MG) BY MOUTH EVERY DAY    FREESTYLE LANCETS 28 GAUGE LANCETS    1 each by Other route once daily.    FREESTYLE LITE STRIPS STRP    Take 2 tablets by mouth nightly.    GABAPENTIN (NEURONTIN) 300 MG CAPSULE    TAKE 2 CAPSULE(300 MG) BY MOUTH QHS    HYDROCORTISONE 2.5 % CREAM    Apply topically 2 (two) times daily.    INSULIN ASPART U-100 (NOVOLOG FLEXPEN U-100 INSULIN) 100 UNIT/ML (3 ML) INPN PEN    Inject 20 Units into the skin 3 (three) times daily with meals.    LANTUS SOLOSTAR U-100 INSULIN GLARGINE 100 UNITS/ML (3ML) SUBQ PEN    Inject 42 Units into the skin 2 (two) times daily.    LATANOPROST 0.005 % OPHTHALMIC SOLUTION    INT 1 GTT IN OU HS    LIDOCAINE-PRILOCAINE (EMLA) CREAM        METOPROLOL TARTRATE (LOPRESSOR) 25 MG TABLET    Take 1 tablet (25 mg total) by mouth daily as needed.    PEN NEEDLE, DIABETIC (BD ULTRA-FINE VINCE PEN NEEDLE) 32 GAUGE X 5/32" NDLE    USE TWICE DAILY AS DIRECTED    PREDNISOLONE ACETATE (PRED FORTE) 1 % DRPS    INSTILL ONE DROP IN OU BID AS NEEDED    QUETIAPINE (SEROQUEL) 400 MG TABLET    Take 400 mg by mouth nightly.    ROSUVASTATIN (CRESTOR) 20 MG TABLET    Take 1 tablet (20 mg total) by mouth once daily.    SITAGLIPTIN (JANUVIA) 50 MG TAB    Take 1 tablet (50 mg total) by mouth once daily.    TAMSULOSIN (FLOMAX) 0.4 MG CP24    TK 1 C PO QD    TIZANIDINE (ZANAFLEX) 2 MG TABLET    Take 2 tablets qhs    TRAMADOL (ULTRAM) 50 MG TABLET    tramadol 50 mg tablet every 8 hours as needed.       "

## 2020-09-09 RX ORDER — GABAPENTIN 300 MG/1
CAPSULE ORAL
Qty: 60 CAPSULE | Refills: 5 | Status: SHIPPED | OUTPATIENT
Start: 2020-09-09 | End: 2021-12-09

## 2020-09-14 ENCOUNTER — TELEPHONE (OUTPATIENT)
Dept: INTERNAL MEDICINE | Facility: CLINIC | Age: 65
End: 2020-09-14

## 2020-09-14 NOTE — TELEPHONE ENCOUNTER
Spoke with patient and informed him he will have to cll back the last week of September or the first week of October to schedule his flu injection. patient verbalized understanding
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
## 2020-09-14 NOTE — TELEPHONE ENCOUNTER
----- Message from Kamille Velazquez sent at 9/14/2020  4:08 PM CDT -----  Type:  Needs Medical Advice    Who Called:  Pt  Hossein   Symptoms (please be specific):  Pt is wanting to schedule a flu shot and a pneumonia shot    How long has patient had these symptoms:     Pharmacy name and phone #:     Would the patient rather a call back or a response via [facility]?   Call back   Best Call Back Number:   [phone]  Additional Information:   please call//thanks//[facility]

## 2020-09-14 NOTE — TELEPHONE ENCOUNTER
----- Message from Kim Bryan sent at 9/14/2020  2:51 PM CDT -----  States he needs to get his lab results for his diabetes faxed to Dr Isreal Alvares, fax# [phone]. Please call pt [phone]. Thank you

## 2020-10-05 ENCOUNTER — TELEPHONE (OUTPATIENT)
Dept: INTERNAL MEDICINE | Facility: CLINIC | Age: 65
End: 2020-10-05

## 2020-10-05 NOTE — TELEPHONE ENCOUNTER
----- Message from Irina Finch sent at 10/5/2020  3:56 PM CDT -----  Regarding: appt access  Contact: Pt  Pt is calling to get his pneumonia shot and flu shot. Please call back at [phone]//thank you acc

## 2020-10-08 ENCOUNTER — IMMUNIZATION (OUTPATIENT)
Dept: INTERNAL MEDICINE | Facility: CLINIC | Age: 65
End: 2020-10-08
Payer: MEDICARE

## 2020-10-08 PROCEDURE — 90694 VACC AIIV4 NO PRSRV 0.5ML IM: CPT | Mod: PBBFAC,PO

## 2020-10-08 PROCEDURE — G0008 ADMIN INFLUENZA VIRUS VAC: HCPCS | Mod: PBBFAC,PO

## 2020-10-21 ENCOUNTER — TELEPHONE (OUTPATIENT)
Dept: INTERNAL MEDICINE | Facility: CLINIC | Age: 65
End: 2020-10-21

## 2020-10-21 NOTE — TELEPHONE ENCOUNTER
----- Message from Vilma Leyva sent at 10/21/2020  3:28 PM CDT -----  Contact: [phone] self  Patient would like to consult with nurse regarding home health service for home. patient states he need someone to clean up his house regarding an bad knee and ankle. Please call back at [phone]. Thanks

## 2020-10-21 NOTE — TELEPHONE ENCOUNTER
Pt will contact ins to find out who will cover helping him take care of his home needs/and call back to get an order from

## 2020-11-30 RX ORDER — FUROSEMIDE 20 MG/1
20 TABLET ORAL DAILY PRN
Qty: 30 TABLET | Refills: 2 | Status: SHIPPED | OUTPATIENT
Start: 2020-11-30 | End: 2021-03-06

## 2020-12-21 RX ORDER — ROSUVASTATIN CALCIUM 20 MG/1
20 TABLET, COATED ORAL DAILY
Qty: 90 TABLET | Refills: 3 | Status: SHIPPED | OUTPATIENT
Start: 2020-12-21 | End: 2021-12-31

## 2020-12-21 NOTE — TELEPHONE ENCOUNTER
----- Message from Pamella Whitaker sent at 12/21/2020  3:26 PM CST -----  Contact: Hossein  Type:  RX Refill Request    Who Called: Hossein  Refill or New Rx: refill  RX Name and Strength: Rosuvastatin (CRESTOR) 20 MG   How is the patient currently taking it? (ex. 1XDay): 1x day  Is this a 30 day or 90 day RX: 90  Preferred Pharmacy with phone number:   [id number] - [place] - [address] AT [facility]  [address] [address]  Phone: [phone] Fax: [phone]  Local or Mail Order: Local  Ordering Provider: Dr. Chamberlain  Would the patient rather a call back or a response via [facility]? Call back   Best Call Back Number: Please call him at [phone]  Additional Information: n/a

## 2020-12-31 ENCOUNTER — EXTERNAL CHRONIC CARE MANAGEMENT (OUTPATIENT)
Dept: PRIMARY CARE CLINIC | Facility: CLINIC | Age: 65
End: 2020-12-31
Payer: MEDICARE

## 2020-12-31 PROCEDURE — 99490 CHRNC CARE MGMT STAFF 1ST 20: CPT | Mod: PBBFAC,PO | Performed by: INTERNAL MEDICINE

## 2020-12-31 PROCEDURE — 99490 CHRNC CARE MGMT STAFF 1ST 20: CPT | Mod: S$PBB,,, | Performed by: INTERNAL MEDICINE

## 2020-12-31 PROCEDURE — 99490 PR CHRONIC CARE MGMT, 1ST 20 MIN: ICD-10-PCS | Mod: S$PBB,,, | Performed by: INTERNAL MEDICINE

## 2021-01-31 ENCOUNTER — EXTERNAL CHRONIC CARE MANAGEMENT (OUTPATIENT)
Dept: PRIMARY CARE CLINIC | Facility: CLINIC | Age: 66
End: 2021-01-31
Payer: MEDICARE

## 2021-01-31 PROCEDURE — 99490 CHRNC CARE MGMT STAFF 1ST 20: CPT | Mod: S$PBB,,, | Performed by: INTERNAL MEDICINE

## 2021-01-31 PROCEDURE — 99490 PR CHRONIC CARE MGMT, 1ST 20 MIN: ICD-10-PCS | Mod: S$PBB,,, | Performed by: INTERNAL MEDICINE

## 2021-01-31 PROCEDURE — 99490 CHRNC CARE MGMT STAFF 1ST 20: CPT | Mod: PBBFAC,PO | Performed by: INTERNAL MEDICINE

## 2021-02-28 ENCOUNTER — EXTERNAL CHRONIC CARE MANAGEMENT (OUTPATIENT)
Dept: PRIMARY CARE CLINIC | Facility: CLINIC | Age: 66
End: 2021-02-28
Payer: MEDICARE

## 2021-02-28 PROCEDURE — 99490 CHRNC CARE MGMT STAFF 1ST 20: CPT | Mod: PBBFAC,PO | Performed by: INTERNAL MEDICINE

## 2021-02-28 PROCEDURE — 99490 CHRNC CARE MGMT STAFF 1ST 20: CPT | Mod: S$PBB,,, | Performed by: INTERNAL MEDICINE

## 2021-02-28 PROCEDURE — 99490 PR CHRONIC CARE MGMT, 1ST 20 MIN: ICD-10-PCS | Mod: S$PBB,,, | Performed by: INTERNAL MEDICINE

## 2021-03-16 LAB
LEFT EYE DM RETINOPATHY: NEGATIVE
RIGHT EYE DM RETINOPATHY: NEGATIVE

## 2021-03-17 DIAGNOSIS — E11.9 TYPE 2 DIABETES MELLITUS WITHOUT COMPLICATION: ICD-10-CM

## 2021-03-31 ENCOUNTER — EXTERNAL CHRONIC CARE MANAGEMENT (OUTPATIENT)
Dept: PRIMARY CARE CLINIC | Facility: CLINIC | Age: 66
End: 2021-03-31
Payer: MEDICARE

## 2021-03-31 ENCOUNTER — PATIENT OUTREACH (OUTPATIENT)
Dept: ADMINISTRATIVE | Facility: HOSPITAL | Age: 66
End: 2021-03-31

## 2021-03-31 PROCEDURE — 99490 PR CHRONIC CARE MGMT, 1ST 20 MIN: ICD-10-PCS | Mod: S$PBB,,, | Performed by: INTERNAL MEDICINE

## 2021-03-31 PROCEDURE — 99490 CHRNC CARE MGMT STAFF 1ST 20: CPT | Mod: S$PBB,,, | Performed by: INTERNAL MEDICINE

## 2021-03-31 PROCEDURE — 99490 CHRNC CARE MGMT STAFF 1ST 20: CPT | Mod: PBBFAC,PO | Performed by: INTERNAL MEDICINE

## 2021-04-15 ENCOUNTER — TELEPHONE (OUTPATIENT)
Dept: INTERNAL MEDICINE | Facility: CLINIC | Age: 66
End: 2021-04-15

## 2021-04-21 DIAGNOSIS — E11.8 DM (DIABETES MELLITUS) WITH COMPLICATIONS: Primary | ICD-10-CM

## 2021-04-21 RX ORDER — LANCETS 28 GAUGE
EACH MISCELLANEOUS
Qty: 200 EACH | Refills: 1 | Status: SHIPPED | OUTPATIENT
Start: 2021-04-21 | End: 2021-07-16 | Stop reason: SDUPTHER

## 2021-04-21 RX ORDER — BLOOD-GLUCOSE METER
KIT MISCELLANEOUS
Qty: 200 EACH | Refills: 1 | Status: SHIPPED | OUTPATIENT
Start: 2021-04-21 | End: 2021-07-16 | Stop reason: SDUPTHER

## 2021-04-30 ENCOUNTER — EXTERNAL CHRONIC CARE MANAGEMENT (OUTPATIENT)
Dept: PRIMARY CARE CLINIC | Facility: CLINIC | Age: 66
End: 2021-04-30
Payer: MEDICARE

## 2021-04-30 PROCEDURE — 99490 CHRNC CARE MGMT STAFF 1ST 20: CPT | Mod: PBBFAC,PO | Performed by: INTERNAL MEDICINE

## 2021-04-30 PROCEDURE — 99490 PR CHRONIC CARE MGMT, 1ST 20 MIN: ICD-10-PCS | Mod: S$PBB,,, | Performed by: INTERNAL MEDICINE

## 2021-04-30 PROCEDURE — 99490 CHRNC CARE MGMT STAFF 1ST 20: CPT | Mod: S$PBB,,, | Performed by: INTERNAL MEDICINE

## 2021-07-16 ENCOUNTER — LAB VISIT (OUTPATIENT)
Dept: LAB | Facility: HOSPITAL | Age: 66
End: 2021-07-16
Attending: INTERNAL MEDICINE
Payer: MEDICARE

## 2021-07-16 ENCOUNTER — OFFICE VISIT (OUTPATIENT)
Dept: INTERNAL MEDICINE | Facility: CLINIC | Age: 66
End: 2021-07-16
Payer: MEDICARE

## 2021-07-16 VITALS
HEIGHT: 73 IN | SYSTOLIC BLOOD PRESSURE: 130 MMHG | BODY MASS INDEX: 24.16 KG/M2 | WEIGHT: 182.31 LBS | HEART RATE: 93 BPM | OXYGEN SATURATION: 97 % | DIASTOLIC BLOOD PRESSURE: 70 MMHG

## 2021-07-16 DIAGNOSIS — F31.9 BIPOLAR 1 DISORDER: ICD-10-CM

## 2021-07-16 DIAGNOSIS — Z12.5 PROSTATE CANCER SCREENING: ICD-10-CM

## 2021-07-16 DIAGNOSIS — N18.30 CKD STAGE 3 DUE TO TYPE 2 DIABETES MELLITUS: ICD-10-CM

## 2021-07-16 DIAGNOSIS — E79.0 HYPERURICEMIA: ICD-10-CM

## 2021-07-16 DIAGNOSIS — E11.8 DM (DIABETES MELLITUS) WITH COMPLICATIONS: ICD-10-CM

## 2021-07-16 DIAGNOSIS — E11.22 TYPE 2 DIABETES MELLITUS WITH STAGE 3 CHRONIC KIDNEY DISEASE, WITHOUT LONG-TERM CURRENT USE OF INSULIN, UNSPECIFIED WHETHER STAGE 3A OR 3B CKD: ICD-10-CM

## 2021-07-16 DIAGNOSIS — I12.9 HYPERTENSION ASSOCIATED WITH STAGE 3 CHRONIC KIDNEY DISEASE DUE TO TYPE 2 DIABETES MELLITUS: Primary | ICD-10-CM

## 2021-07-16 DIAGNOSIS — E11.22 HYPERTENSION ASSOCIATED WITH STAGE 3 CHRONIC KIDNEY DISEASE DUE TO TYPE 2 DIABETES MELLITUS: Primary | ICD-10-CM

## 2021-07-16 DIAGNOSIS — L97.529 ULCER OF LEFT FOOT, UNSPECIFIED ULCER STAGE: ICD-10-CM

## 2021-07-16 DIAGNOSIS — Z95.0 PACEMAKER: ICD-10-CM

## 2021-07-16 DIAGNOSIS — E11.22 CKD STAGE 3 DUE TO TYPE 2 DIABETES MELLITUS: ICD-10-CM

## 2021-07-16 DIAGNOSIS — E78.5 HYPERLIPIDEMIA ASSOCIATED WITH TYPE 2 DIABETES MELLITUS: ICD-10-CM

## 2021-07-16 DIAGNOSIS — R97.20 ELEVATED PSA: ICD-10-CM

## 2021-07-16 DIAGNOSIS — Z86.010 HISTORY OF COLON POLYPS: ICD-10-CM

## 2021-07-16 DIAGNOSIS — N18.30 TYPE 2 DIABETES MELLITUS WITH STAGE 3 CHRONIC KIDNEY DISEASE, WITHOUT LONG-TERM CURRENT USE OF INSULIN, UNSPECIFIED WHETHER STAGE 3A OR 3B CKD: ICD-10-CM

## 2021-07-16 DIAGNOSIS — E11.42 CONTROLLED TYPE 2 DIABETES MELLITUS WITH DIABETIC POLYNEUROPATHY, WITHOUT LONG-TERM CURRENT USE OF INSULIN: ICD-10-CM

## 2021-07-16 DIAGNOSIS — N18.30 HYPERTENSION ASSOCIATED WITH STAGE 3 CHRONIC KIDNEY DISEASE DUE TO TYPE 2 DIABETES MELLITUS: Primary | ICD-10-CM

## 2021-07-16 DIAGNOSIS — E11.69 HYPERLIPIDEMIA ASSOCIATED WITH TYPE 2 DIABETES MELLITUS: ICD-10-CM

## 2021-07-16 DIAGNOSIS — Z12.11 SCREEN FOR COLON CANCER: ICD-10-CM

## 2021-07-16 LAB
ALBUMIN SERPL BCP-MCNC: 3.7 G/DL (ref 3.5–5.2)
ALP SERPL-CCNC: 81 U/L (ref 55–135)
ALT SERPL W/O P-5'-P-CCNC: 7 U/L (ref 10–44)
ANION GAP SERPL CALC-SCNC: 11 MMOL/L (ref 8–16)
AST SERPL-CCNC: 18 U/L (ref 10–40)
BILIRUB SERPL-MCNC: 0.6 MG/DL (ref 0.1–1)
BUN SERPL-MCNC: 23 MG/DL (ref 8–23)
CALCIUM SERPL-MCNC: 9.6 MG/DL (ref 8.7–10.5)
CHLORIDE SERPL-SCNC: 105 MMOL/L (ref 95–110)
CHOLEST SERPL-MCNC: 154 MG/DL (ref 120–199)
CHOLEST/HDLC SERPL: 2.5 {RATIO} (ref 2–5)
CO2 SERPL-SCNC: 23 MMOL/L (ref 23–29)
CREAT SERPL-MCNC: 2.4 MG/DL (ref 0.5–1.4)
EST. GFR  (AFRICAN AMERICAN): 31.1 ML/MIN/1.73 M^2
EST. GFR  (NON AFRICAN AMERICAN): 26.9 ML/MIN/1.73 M^2
GLUCOSE SERPL-MCNC: 134 MG/DL (ref 70–110)
HDLC SERPL-MCNC: 62 MG/DL (ref 40–75)
HDLC SERPL: 40.3 % (ref 20–50)
LDLC SERPL CALC-MCNC: 78.2 MG/DL (ref 63–159)
NONHDLC SERPL-MCNC: 92 MG/DL
POTASSIUM SERPL-SCNC: 4.9 MMOL/L (ref 3.5–5.1)
PROT SERPL-MCNC: 6.9 G/DL (ref 6–8.4)
SODIUM SERPL-SCNC: 139 MMOL/L (ref 136–145)
TRIGL SERPL-MCNC: 69 MG/DL (ref 30–150)

## 2021-07-16 PROCEDURE — 80061 LIPID PANEL: CPT | Performed by: INTERNAL MEDICINE

## 2021-07-16 PROCEDURE — 99214 OFFICE O/P EST MOD 30 MIN: CPT | Mod: S$PBB,,, | Performed by: INTERNAL MEDICINE

## 2021-07-16 PROCEDURE — 99214 OFFICE O/P EST MOD 30 MIN: CPT | Mod: PBBFAC,PO | Performed by: INTERNAL MEDICINE

## 2021-07-16 PROCEDURE — 83036 HEMOGLOBIN GLYCOSYLATED A1C: CPT | Performed by: INTERNAL MEDICINE

## 2021-07-16 PROCEDURE — 80053 COMPREHEN METABOLIC PANEL: CPT | Performed by: INTERNAL MEDICINE

## 2021-07-16 PROCEDURE — 84443 ASSAY THYROID STIM HORMONE: CPT | Performed by: INTERNAL MEDICINE

## 2021-07-16 PROCEDURE — 99214 PR OFFICE/OUTPT VISIT, EST, LEVL IV, 30-39 MIN: ICD-10-PCS | Mod: S$PBB,,, | Performed by: INTERNAL MEDICINE

## 2021-07-16 PROCEDURE — 99999 PR PBB SHADOW E&M-EST. PATIENT-LVL IV: CPT | Mod: PBBFAC,,, | Performed by: INTERNAL MEDICINE

## 2021-07-16 PROCEDURE — 84153 ASSAY OF PSA TOTAL: CPT | Performed by: INTERNAL MEDICINE

## 2021-07-16 PROCEDURE — 99999 PR PBB SHADOW E&M-EST. PATIENT-LVL IV: ICD-10-PCS | Mod: PBBFAC,,, | Performed by: INTERNAL MEDICINE

## 2021-07-16 PROCEDURE — 36415 COLL VENOUS BLD VENIPUNCTURE: CPT | Mod: PO | Performed by: INTERNAL MEDICINE

## 2021-07-16 RX ORDER — BLOOD-GLUCOSE METER
KIT MISCELLANEOUS
Qty: 200 EACH | Refills: 1 | Status: SHIPPED | OUTPATIENT
Start: 2021-07-16 | End: 2021-09-24 | Stop reason: SDUPTHER

## 2021-07-16 RX ORDER — FEBUXOSTAT 40 MG/1
40 TABLET, FILM COATED ORAL DAILY
Qty: 90 TABLET | Refills: 3 | Status: SHIPPED | OUTPATIENT
Start: 2021-07-16 | End: 2021-09-24 | Stop reason: SDUPTHER

## 2021-07-16 RX ORDER — CYCLOSPORINE 0.5 MG/ML
EMULSION OPHTHALMIC
COMMUNITY

## 2021-07-16 RX ORDER — LANCETS 28 GAUGE
EACH MISCELLANEOUS
Qty: 200 EACH | Refills: 1 | Status: SHIPPED | OUTPATIENT
Start: 2021-07-16 | End: 2021-09-24 | Stop reason: SDUPTHER

## 2021-07-16 RX ORDER — INSULIN GLARGINE 100 [IU]/ML
42 INJECTION, SOLUTION SUBCUTANEOUS 2 TIMES DAILY
Qty: 3 BOX | Refills: 3 | Status: SHIPPED | OUTPATIENT
Start: 2021-07-16 | End: 2022-02-06

## 2021-07-16 RX ORDER — PEN NEEDLE, DIABETIC 31 GX5/16"
NEEDLE, DISPOSABLE MISCELLANEOUS
Qty: 100 EACH | Refills: 3 | Status: SHIPPED | OUTPATIENT
Start: 2021-07-16

## 2021-07-16 RX ORDER — FERROUS SULFATE 325(65) MG
1 TABLET ORAL DAILY
COMMUNITY
Start: 2021-03-20

## 2021-07-16 RX ORDER — LORAZEPAM 0.5 MG/1
1 TABLET ORAL NIGHTLY
COMMUNITY
Start: 2021-07-08 | End: 2021-07-16

## 2021-07-16 RX ORDER — FUROSEMIDE 20 MG/1
20 TABLET ORAL DAILY
Qty: 90 TABLET | Refills: 3 | Status: SHIPPED | OUTPATIENT
Start: 2021-07-16 | End: 2022-07-26

## 2021-07-17 ENCOUNTER — TELEPHONE (OUTPATIENT)
Dept: INTERNAL MEDICINE | Facility: CLINIC | Age: 66
End: 2021-07-17

## 2021-07-17 DIAGNOSIS — E11.8 DM (DIABETES MELLITUS) WITH COMPLICATIONS: Primary | ICD-10-CM

## 2021-07-17 LAB
COMPLEXED PSA SERPL-MCNC: 0.93 NG/ML (ref 0–4)
ESTIMATED AVG GLUCOSE: 169 MG/DL (ref 68–131)
HBA1C MFR BLD: 7.5 % (ref 4–5.6)
TSH SERPL DL<=0.005 MIU/L-ACNC: 0.92 UIU/ML (ref 0.4–4)

## 2021-07-18 RX ORDER — POLYETHYLENE GLYCOL 3350, SODIUM SULFATE ANHYDROUS, SODIUM BICARBONATE, SODIUM CHLORIDE, POTASSIUM CHLORIDE 236; 22.74; 6.74; 5.86; 2.97 G/4L; G/4L; G/4L; G/4L; G/4L
4 POWDER, FOR SOLUTION ORAL ONCE
Qty: 4000 ML | Refills: 0 | Status: SHIPPED | OUTPATIENT
Start: 2021-07-18 | End: 2021-07-18

## 2021-07-19 ENCOUNTER — TELEPHONE (OUTPATIENT)
Dept: INTERNAL MEDICINE | Facility: CLINIC | Age: 66
End: 2021-07-19

## 2021-07-28 ENCOUNTER — TELEPHONE (OUTPATIENT)
Dept: INTERNAL MEDICINE | Facility: CLINIC | Age: 66
End: 2021-07-28

## 2021-08-31 ENCOUNTER — EXTERNAL CHRONIC CARE MANAGEMENT (OUTPATIENT)
Dept: PRIMARY CARE CLINIC | Facility: CLINIC | Age: 66
End: 2021-08-31
Payer: MEDICARE

## 2021-08-31 PROCEDURE — 99490 PR CHRONIC CARE MGMT, 1ST 20 MIN: ICD-10-PCS | Mod: S$PBB,,, | Performed by: INTERNAL MEDICINE

## 2021-08-31 PROCEDURE — 99490 CHRNC CARE MGMT STAFF 1ST 20: CPT | Mod: S$PBB,,, | Performed by: INTERNAL MEDICINE

## 2021-08-31 PROCEDURE — 99490 CHRNC CARE MGMT STAFF 1ST 20: CPT | Mod: PBBFAC,PO | Performed by: INTERNAL MEDICINE

## 2021-09-21 ENCOUNTER — TELEPHONE (OUTPATIENT)
Dept: INTERNAL MEDICINE | Facility: CLINIC | Age: 66
End: 2021-09-21

## 2021-09-24 DIAGNOSIS — E11.8 DM (DIABETES MELLITUS) WITH COMPLICATIONS: ICD-10-CM

## 2021-09-24 RX ORDER — FEBUXOSTAT 40 MG/1
40 TABLET, FILM COATED ORAL DAILY
Qty: 90 TABLET | Refills: 3 | Status: SHIPPED | OUTPATIENT
Start: 2021-09-24

## 2021-09-24 RX ORDER — LANCETS 28 GAUGE
EACH MISCELLANEOUS
Qty: 200 EACH | Refills: 1 | Status: SHIPPED | OUTPATIENT
Start: 2021-09-24 | End: 2021-09-30 | Stop reason: SDUPTHER

## 2021-09-24 RX ORDER — BLOOD-GLUCOSE METER
KIT MISCELLANEOUS
Qty: 200 EACH | Refills: 1 | Status: SHIPPED | OUTPATIENT
Start: 2021-09-24 | End: 2021-09-28 | Stop reason: SDUPTHER

## 2021-09-28 ENCOUNTER — OFFICE VISIT (OUTPATIENT)
Dept: INTERNAL MEDICINE | Facility: CLINIC | Age: 66
End: 2021-09-28
Payer: MEDICARE

## 2021-09-28 VITALS
DIASTOLIC BLOOD PRESSURE: 60 MMHG | SYSTOLIC BLOOD PRESSURE: 100 MMHG | HEIGHT: 73 IN | WEIGHT: 177.25 LBS | TEMPERATURE: 98 F | OXYGEN SATURATION: 98 % | HEART RATE: 72 BPM | BODY MASS INDEX: 23.49 KG/M2

## 2021-09-28 DIAGNOSIS — E11.8 DM (DIABETES MELLITUS) WITH COMPLICATIONS: ICD-10-CM
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
## 2021-09-28 DIAGNOSIS — M54.2 NECK PAIN ON LEFT SIDE: ICD-10-CM

## 2021-09-28 DIAGNOSIS — M79.602 LEFT ARM PAIN: ICD-10-CM

## 2021-09-28 PROCEDURE — 99213 OFFICE O/P EST LOW 20 MIN: CPT | Mod: S$PBB,,, | Performed by: NURSE PRACTITIONER

## 2021-09-28 PROCEDURE — 99213 PR OFFICE/OUTPT VISIT, EST, LEVL III, 20-29 MIN: ICD-10-PCS | Mod: S$PBB,,, | Performed by: NURSE PRACTITIONER

## 2021-09-28 PROCEDURE — 99999 PR PBB SHADOW E&M-EST. PATIENT-LVL V: CPT | Mod: PBBFAC,,, | Performed by: NURSE PRACTITIONER

## 2021-09-28 PROCEDURE — 99999 PR PBB SHADOW E&M-EST. PATIENT-LVL V: ICD-10-PCS | Mod: PBBFAC,,, | Performed by: NURSE PRACTITIONER

## 2021-09-28 PROCEDURE — 99215 OFFICE O/P EST HI 40 MIN: CPT | Mod: PBBFAC,PO | Performed by: NURSE PRACTITIONER

## 2021-09-28 RX ORDER — CLONAZEPAM 1 MG/1
TABLET ORAL
COMMUNITY
Start: 2021-07-29

## 2021-09-28 RX ORDER — PEN NEEDLE, DIABETIC 30 GX3/16"
NEEDLE, DISPOSABLE MISCELLANEOUS
Qty: 100 EACH | Refills: 3 | Status: SHIPPED | OUTPATIENT
Start: 2021-09-28

## 2021-09-28 RX ORDER — BLOOD-GLUCOSE METER
KIT MISCELLANEOUS
Qty: 200 EACH | Refills: 1 | Status: SHIPPED | OUTPATIENT
Start: 2021-09-28 | End: 2022-03-31

## 2021-09-28 RX ORDER — TRAMADOL HYDROCHLORIDE 50 MG/1
TABLET ORAL
Qty: 20 TABLET | Refills: 0 | Status: SHIPPED | OUTPATIENT
Start: 2021-09-28 | End: 2021-10-28 | Stop reason: SDUPTHER

## 2021-09-28 RX ORDER — TIZANIDINE 2 MG/1
TABLET ORAL
Qty: 60 TABLET | Refills: 2 | Status: SHIPPED | OUTPATIENT
Start: 2021-09-28 | End: 2022-01-05 | Stop reason: SDUPTHER

## 2021-09-30 ENCOUNTER — EXTERNAL CHRONIC CARE MANAGEMENT (OUTPATIENT)
Dept: PRIMARY CARE CLINIC | Facility: CLINIC | Age: 66
End: 2021-09-30
Payer: MEDICARE

## 2021-09-30 DIAGNOSIS — E11.8 DM (DIABETES MELLITUS) WITH COMPLICATIONS: ICD-10-CM

## 2021-09-30 PROCEDURE — 99490 PR CHRONIC CARE MGMT, 1ST 20 MIN: ICD-10-PCS | Mod: S$PBB,,, | Performed by: INTERNAL MEDICINE

## 2021-09-30 PROCEDURE — 99490 CHRNC CARE MGMT STAFF 1ST 20: CPT | Mod: S$PBB,,, | Performed by: INTERNAL MEDICINE

## 2021-09-30 PROCEDURE — 99490 CHRNC CARE MGMT STAFF 1ST 20: CPT | Mod: PBBFAC,PO | Performed by: INTERNAL MEDICINE

## 2021-09-30 RX ORDER — LANCETS 28 GAUGE
EACH MISCELLANEOUS
Qty: 200 EACH | Refills: 1 | Status: SHIPPED | OUTPATIENT
Start: 2021-09-30

## 2021-10-06 ENCOUNTER — TELEPHONE (OUTPATIENT)
Dept: INTERNAL MEDICINE | Facility: CLINIC | Age: 66
End: 2021-10-06

## 2021-10-07 ENCOUNTER — ANESTHESIA EVENT (OUTPATIENT)
Dept: ENDOSCOPY | Facility: HOSPITAL | Age: 66
End: 2021-10-07
Payer: MEDICARE

## 2021-10-07 ENCOUNTER — HOSPITAL ENCOUNTER (OUTPATIENT)
Facility: HOSPITAL | Age: 66
Discharge: HOME | End: 2021-10-07
Attending: INTERNAL MEDICINE | Admitting: INTERNAL MEDICINE
Payer: MEDICARE

## 2021-10-07 ENCOUNTER — ANESTHESIA (OUTPATIENT)
Dept: ENDOSCOPY | Facility: HOSPITAL | Age: 66
End: 2021-10-07
Payer: MEDICARE

## 2021-10-07 DIAGNOSIS — Z12.11 COLON CANCER SCREENING: Primary | ICD-10-CM

## 2021-10-07 LAB
POCT GLUCOSE: 93 MG/DL (ref 70–110)
SARS-COV-2 RDRP RESP QL NAA+PROBE: NEGATIVE

## 2021-10-07 PROCEDURE — U0002 COVID-19 LAB TEST NON-CDC: HCPCS | Performed by: INTERNAL MEDICINE

## 2021-10-07 PROCEDURE — 27200997: Performed by: INTERNAL MEDICINE

## 2021-10-07 PROCEDURE — 88305 TISSUE EXAM BY PATHOLOGIST: ICD-10-PCS | Mod: 26,,, | Performed by: PATHOLOGY

## 2021-10-07 PROCEDURE — 37000009 HC ANESTHESIA EA ADD 15 MINS: Performed by: INTERNAL MEDICINE

## 2021-10-07 PROCEDURE — 37000008 HC ANESTHESIA 1ST 15 MINUTES: Performed by: INTERNAL MEDICINE

## 2021-10-07 PROCEDURE — 45385 COLONOSCOPY W/LESION REMOVAL: CPT | Performed by: INTERNAL MEDICINE

## 2021-10-07 PROCEDURE — 45385 COLONOSCOPY W/LESION REMOVAL: CPT | Mod: PT,,, | Performed by: INTERNAL MEDICINE

## 2021-10-07 PROCEDURE — 63600175 PHARM REV CODE 636 W HCPCS: Performed by: NURSE ANESTHETIST, CERTIFIED REGISTERED

## 2021-10-07 PROCEDURE — 27201089 HC SNARE, DISP (ANY): Performed by: INTERNAL MEDICINE

## 2021-10-07 PROCEDURE — 63600175 PHARM REV CODE 636 W HCPCS: Performed by: INTERNAL MEDICINE

## 2021-10-07 PROCEDURE — 88305 TISSUE EXAM BY PATHOLOGIST: CPT | Performed by: PATHOLOGY

## 2021-10-07 PROCEDURE — 88305 TISSUE EXAM BY PATHOLOGIST: CPT | Mod: 26,,, | Performed by: PATHOLOGY

## 2021-10-07 PROCEDURE — 25000003 PHARM REV CODE 250: Performed by: NURSE ANESTHETIST, CERTIFIED REGISTERED

## 2021-10-07 PROCEDURE — 45385 PR COLONOSCOPY,REMV LESN,SNARE: ICD-10-PCS | Mod: PT,,, | Performed by: INTERNAL MEDICINE

## 2021-10-07 RX ORDER — SODIUM CHLORIDE, SODIUM LACTATE, POTASSIUM CHLORIDE, CALCIUM CHLORIDE 600; 310; 30; 20 MG/100ML; MG/100ML; MG/100ML; MG/100ML
INJECTION, SOLUTION INTRAVENOUS CONTINUOUS
Status: DISCONTINUED | OUTPATIENT
Start: 2021-10-07 | End: 2021-10-07 | Stop reason: HOSPADM

## 2021-10-07 RX ORDER — PROPOFOL 10 MG/ML
VIAL (ML) INTRAVENOUS
Status: DISCONTINUED | OUTPATIENT
Start: 2021-10-07 | End: 2021-10-07

## 2021-10-07 RX ORDER — LIDOCAINE HCL/PF 100 MG/5ML
SYRINGE (ML) INTRAVENOUS
Status: DISCONTINUED | OUTPATIENT
Start: 2021-10-07 | End: 2021-10-07

## 2021-10-07 RX ADMIN — PROPOFOL 50 MG: 10 INJECTION, EMULSION INTRAVENOUS at 10:10

## 2021-10-07 RX ADMIN — SODIUM CHLORIDE, SODIUM LACTATE, POTASSIUM CHLORIDE, AND CALCIUM CHLORIDE: 600; 310; 30; 20 INJECTION, SOLUTION INTRAVENOUS at 10:10

## 2021-10-07 RX ADMIN — LIDOCAINE HYDROCHLORIDE 20 MG: 20 INJECTION, SOLUTION INTRAVENOUS at 10:10

## 2021-10-11 ENCOUNTER — IMMUNIZATION (OUTPATIENT)
Dept: INTERNAL MEDICINE | Facility: CLINIC | Age: 66
End: 2021-10-11
Payer: MEDICARE

## 2021-10-11 PROCEDURE — 90694 VACC AIIV4 NO PRSRV 0.5ML IM: CPT | Mod: PBBFAC,PO

## 2021-10-11 PROCEDURE — G0008 ADMIN INFLUENZA VIRUS VAC: HCPCS | Mod: PBBFAC,PO

## 2021-10-12 VITALS
SYSTOLIC BLOOD PRESSURE: 130 MMHG | BODY MASS INDEX: 23.19 KG/M2 | OXYGEN SATURATION: 99 % | TEMPERATURE: 97 F | RESPIRATION RATE: 14 BRPM | DIASTOLIC BLOOD PRESSURE: 58 MMHG | WEIGHT: 175 LBS | HEART RATE: 67 BPM | HEIGHT: 73 IN

## 2021-10-13 LAB
FINAL PATHOLOGIC DIAGNOSIS: NORMAL
GROSS: NORMAL
Lab: NORMAL

## 2021-10-21 ENCOUNTER — OFFICE VISIT (OUTPATIENT)
Dept: INTERNAL MEDICINE | Facility: CLINIC | Age: 66
End: 2021-10-21
Payer: MEDICARE

## 2021-10-21 VITALS
TEMPERATURE: 98 F | BODY MASS INDEX: 22.62 KG/M2 | HEART RATE: 86 BPM | WEIGHT: 170.63 LBS | OXYGEN SATURATION: 99 % | HEIGHT: 73 IN | DIASTOLIC BLOOD PRESSURE: 72 MMHG | SYSTOLIC BLOOD PRESSURE: 126 MMHG

## 2021-10-21 DIAGNOSIS — G44.86 CERVICOGENIC HEADACHE: ICD-10-CM

## 2021-10-21 DIAGNOSIS — L85.3 DRY SKIN: ICD-10-CM

## 2021-10-21 DIAGNOSIS — R35.0 URINARY FREQUENCY: Primary | ICD-10-CM

## 2021-10-21 LAB
BILIRUB SERPL-MCNC: NEGATIVE MG/DL
BLOOD URINE, POC: NEGATIVE
CLARITY, POC UA: CLEAR
COLOR, POC UA: YELLOW
GLUCOSE UR QL STRIP: NORMAL
KETONES UR QL STRIP: NEGATIVE
LEUKOCYTE ESTERASE URINE, POC: NEGATIVE
NITRITE, POC UA: NEGATIVE
PH, POC UA: 5
PROTEIN, POC: NORMAL
SPECIFIC GRAVITY, POC UA: 1.01
UROBILINOGEN, POC UA: NORMAL

## 2021-10-21 PROCEDURE — 99999 PR PBB SHADOW E&M-EST. PATIENT-LVL V: ICD-10-PCS | Mod: PBBFAC,,, | Performed by: FAMILY MEDICINE

## 2021-10-21 PROCEDURE — 99213 PR OFFICE/OUTPT VISIT, EST, LEVL III, 20-29 MIN: ICD-10-PCS | Mod: S$PBB,,, | Performed by: FAMILY MEDICINE

## 2021-10-21 PROCEDURE — 99999 PR PBB SHADOW E&M-EST. PATIENT-LVL V: CPT | Mod: PBBFAC,,, | Performed by: FAMILY MEDICINE

## 2021-10-21 PROCEDURE — 81002 URINALYSIS NONAUTO W/O SCOPE: CPT | Mod: PBBFAC,PO | Performed by: FAMILY MEDICINE

## 2021-10-21 PROCEDURE — 99215 OFFICE O/P EST HI 40 MIN: CPT | Mod: PBBFAC,PO | Performed by: FAMILY MEDICINE

## 2021-10-21 PROCEDURE — 99213 OFFICE O/P EST LOW 20 MIN: CPT | Mod: S$PBB,,, | Performed by: FAMILY MEDICINE

## 2021-10-21 PROCEDURE — 87086 URINE CULTURE/COLONY COUNT: CPT | Performed by: FAMILY MEDICINE

## 2021-10-21 RX ORDER — TRIAMCINOLONE ACETONIDE 0.25 MG/G
OINTMENT TOPICAL 2 TIMES DAILY
Qty: 80 G | Refills: 2 | Status: SHIPPED | OUTPATIENT
Start: 2021-10-21

## 2021-10-22 LAB
BACTERIA UR CULT: NORMAL
BACTERIA UR CULT: NORMAL

## 2021-10-28 ENCOUNTER — OFFICE VISIT (OUTPATIENT)
Dept: INTERNAL MEDICINE | Facility: CLINIC | Age: 66
End: 2021-10-28
Payer: MEDICARE

## 2021-10-28 VITALS
DIASTOLIC BLOOD PRESSURE: 68 MMHG | WEIGHT: 176.25 LBS | HEART RATE: 82 BPM | TEMPERATURE: 98 F | HEIGHT: 73 IN | BODY MASS INDEX: 23.36 KG/M2 | SYSTOLIC BLOOD PRESSURE: 106 MMHG | OXYGEN SATURATION: 98 %
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
## 2021-10-28 DIAGNOSIS — M25.511 ACUTE PAIN OF BOTH SHOULDERS: ICD-10-CM

## 2021-10-28 DIAGNOSIS — M54.2 NECK PAIN ON LEFT SIDE: ICD-10-CM

## 2021-10-28 DIAGNOSIS — M54.2 NECK PAIN: ICD-10-CM

## 2021-10-28 DIAGNOSIS — M79.602 LEFT ARM PAIN: ICD-10-CM

## 2021-10-28 DIAGNOSIS — M25.512 ACUTE PAIN OF BOTH SHOULDERS: ICD-10-CM

## 2021-10-28 PROCEDURE — 99999 PR PBB SHADOW E&M-EST. PATIENT-LVL V: ICD-10-PCS | Mod: PBBFAC,,, | Performed by: NURSE PRACTITIONER

## 2021-10-28 PROCEDURE — 99213 PR OFFICE/OUTPT VISIT, EST, LEVL III, 20-29 MIN: ICD-10-PCS | Mod: S$PBB,,, | Performed by: NURSE PRACTITIONER

## 2021-10-28 PROCEDURE — 99213 OFFICE O/P EST LOW 20 MIN: CPT | Mod: S$PBB,,, | Performed by: NURSE PRACTITIONER

## 2021-10-28 PROCEDURE — 99215 OFFICE O/P EST HI 40 MIN: CPT | Mod: PBBFAC,PN | Performed by: NURSE PRACTITIONER

## 2021-10-28 PROCEDURE — 99999 PR PBB SHADOW E&M-EST. PATIENT-LVL V: CPT | Mod: PBBFAC,,, | Performed by: NURSE PRACTITIONER

## 2021-10-28 RX ORDER — TRAMADOL HYDROCHLORIDE 50 MG/1
TABLET ORAL
Qty: 30 TABLET | Refills: 0 | Status: SHIPPED | OUTPATIENT
Start: 2021-10-28 | End: 2021-12-14 | Stop reason: SDUPTHER

## 2021-10-31 ENCOUNTER — EXTERNAL CHRONIC CARE MANAGEMENT (OUTPATIENT)
Dept: PRIMARY CARE CLINIC | Facility: CLINIC | Age: 66
End: 2021-10-31
Payer: MEDICARE

## 2021-10-31 PROCEDURE — 99439 CHRNC CARE MGMT STAF EA ADDL: CPT | Mod: S$PBB,,, | Performed by: INTERNAL MEDICINE

## 2021-10-31 PROCEDURE — 99490 CHRNC CARE MGMT STAFF 1ST 20: CPT | Mod: PBBFAC,PO | Performed by: INTERNAL MEDICINE

## 2021-10-31 PROCEDURE — 99439 PR CHRONIC CARE MGMT, EA ADDTL 20 MIN: ICD-10-PCS | Mod: S$PBB,,, | Performed by: INTERNAL MEDICINE

## 2021-10-31 PROCEDURE — 99490 PR CHRONIC CARE MGMT, 1ST 20 MIN: ICD-10-PCS | Mod: S$PBB,,, | Performed by: INTERNAL MEDICINE

## 2021-10-31 PROCEDURE — 99439 CHRNC CARE MGMT STAF EA ADDL: CPT | Mod: PBBFAC,PO | Performed by: INTERNAL MEDICINE

## 2021-10-31 PROCEDURE — 99490 CHRNC CARE MGMT STAFF 1ST 20: CPT | Mod: S$PBB,,, | Performed by: INTERNAL MEDICINE

## 2021-11-30 ENCOUNTER — EXTERNAL CHRONIC CARE MANAGEMENT (OUTPATIENT)
Dept: PRIMARY CARE CLINIC | Facility: CLINIC | Age: 66
End: 2021-11-30
Payer: MEDICARE

## 2021-11-30 PROCEDURE — 99490 CHRNC CARE MGMT STAFF 1ST 20: CPT | Mod: S$PBB,,, | Performed by: INTERNAL MEDICINE

## 2021-11-30 PROCEDURE — 99490 PR CHRONIC CARE MGMT, 1ST 20 MIN: ICD-10-PCS | Mod: S$PBB,,, | Performed by: INTERNAL MEDICINE

## 2021-11-30 PROCEDURE — 99490 CHRNC CARE MGMT STAFF 1ST 20: CPT | Mod: PBBFAC,PO | Performed by: INTERNAL MEDICINE

## 2021-12-09 ENCOUNTER — OFFICE VISIT (OUTPATIENT)
Dept: INTERNAL MEDICINE | Facility: CLINIC | Age: 66
End: 2021-12-09
Payer: MEDICARE

## 2021-12-09 VITALS
DIASTOLIC BLOOD PRESSURE: 70 MMHG | HEIGHT: 73 IN | SYSTOLIC BLOOD PRESSURE: 118 MMHG | HEART RATE: 79 BPM | OXYGEN SATURATION: 98 % | WEIGHT: 176.81 LBS | BODY MASS INDEX: 23.43 KG/M2

## 2021-12-09 DIAGNOSIS — E11.42 DIABETIC PERIPHERAL NEUROPATHY ASSOCIATED WITH TYPE 2 DIABETES MELLITUS: Primary | ICD-10-CM

## 2021-12-09 PROCEDURE — 99213 PR OFFICE/OUTPT VISIT, EST, LEVL III, 20-29 MIN: ICD-10-PCS | Mod: S$PBB,,, | Performed by: INTERNAL MEDICINE

## 2021-12-09 PROCEDURE — 99215 OFFICE O/P EST HI 40 MIN: CPT | Mod: PBBFAC,PO | Performed by: INTERNAL MEDICINE

## 2021-12-09 PROCEDURE — 99999 PR PBB SHADOW E&M-EST. PATIENT-LVL V: ICD-10-PCS | Mod: PBBFAC,,, | Performed by: INTERNAL MEDICINE

## 2021-12-09 PROCEDURE — 99213 OFFICE O/P EST LOW 20 MIN: CPT | Mod: S$PBB,,, | Performed by: INTERNAL MEDICINE

## 2021-12-09 PROCEDURE — 99999 PR PBB SHADOW E&M-EST. PATIENT-LVL V: CPT | Mod: PBBFAC,,, | Performed by: INTERNAL MEDICINE

## 2021-12-09 RX ORDER — PREGABALIN 75 MG/1
75 CAPSULE ORAL 2 TIMES DAILY
Qty: 60 CAPSULE | Refills: 5 | Status: SHIPPED | OUTPATIENT
Start: 2021-12-09 | End: 2022-06-09

## 2021-12-14 ENCOUNTER — OFFICE VISIT (OUTPATIENT)
Dept: INTERNAL MEDICINE | Facility: CLINIC | Age: 66
End: 2021-12-14
Payer: MEDICARE

## 2021-12-14 ENCOUNTER — APPOINTMENT (OUTPATIENT)
Dept: RADIOLOGY | Facility: HOSPITAL | Age: 66
End: 2021-12-14
Attending: NURSE PRACTITIONER
Payer: MEDICARE

## 2021-12-14 VITALS
SYSTOLIC BLOOD PRESSURE: 116 MMHG | RESPIRATION RATE: 15 BRPM | BODY MASS INDEX: 23.65 KG/M2 | TEMPERATURE: 98 F | OXYGEN SATURATION: 97 % | DIASTOLIC BLOOD PRESSURE: 62 MMHG | HEART RATE: 74 BPM | WEIGHT: 179.25 LBS

## 2021-12-14 DIAGNOSIS — M25.511 BILATERAL SHOULDER PAIN, UNSPECIFIED CHRONICITY: ICD-10-CM

## 2021-12-14 DIAGNOSIS — M25.512 BILATERAL SHOULDER PAIN, UNSPECIFIED CHRONICITY: ICD-10-CM

## 2021-12-14 DIAGNOSIS — M53.82 WEAKNESS OF NECK: ICD-10-CM

## 2021-12-14 DIAGNOSIS — Z09 FOLLOW UP: Primary | ICD-10-CM

## 2021-12-14 DIAGNOSIS — M54.2 CERVICALGIA: ICD-10-CM

## 2021-12-14 PROCEDURE — 73030 XR SHOULDER COMPLETE 2 OR MORE VIEWS BILATERAL: ICD-10-PCS | Mod: 26,50,, | Performed by: RADIOLOGY

## 2021-12-14 PROCEDURE — 99999 PR PBB SHADOW E&M-EST. PATIENT-LVL V: ICD-10-PCS | Mod: PBBFAC,,, | Performed by: NURSE PRACTITIONER

## 2021-12-14 PROCEDURE — 99999 PR PBB SHADOW E&M-EST. PATIENT-LVL V: CPT | Mod: PBBFAC,,, | Performed by: NURSE PRACTITIONER

## 2021-12-14 PROCEDURE — 99215 OFFICE O/P EST HI 40 MIN: CPT | Mod: PBBFAC,PN | Performed by: NURSE PRACTITIONER

## 2021-12-14 PROCEDURE — 73030 X-RAY EXAM OF SHOULDER: CPT | Mod: TC,50,PO

## 2021-12-14 PROCEDURE — 73030 X-RAY EXAM OF SHOULDER: CPT | Mod: 26,50,, | Performed by: RADIOLOGY

## 2021-12-14 PROCEDURE — 99214 OFFICE O/P EST MOD 30 MIN: CPT | Mod: S$PBB,,, | Performed by: NURSE PRACTITIONER

## 2021-12-14 PROCEDURE — 99214 PR OFFICE/OUTPT VISIT, EST, LEVL IV, 30-39 MIN: ICD-10-PCS | Mod: S$PBB,,, | Performed by: NURSE PRACTITIONER

## 2021-12-14 RX ORDER — TRAMADOL HYDROCHLORIDE 50 MG/1
TABLET ORAL
Qty: 30 TABLET | Refills: 0 | Status: SHIPPED | OUTPATIENT
Start: 2021-12-14

## 2021-12-21 ENCOUNTER — OFFICE VISIT (OUTPATIENT)
Dept: INTERNAL MEDICINE | Facility: CLINIC | Age: 66
End: 2021-12-21
Payer: MEDICARE

## 2021-12-21 VITALS
TEMPERATURE: 98 F | BODY MASS INDEX: 24.14 KG/M2 | DIASTOLIC BLOOD PRESSURE: 68 MMHG | WEIGHT: 182.13 LBS | SYSTOLIC BLOOD PRESSURE: 138 MMHG | HEIGHT: 73 IN

## 2021-12-21 DIAGNOSIS — M19.011 PRIMARY OSTEOARTHRITIS OF BOTH SHOULDERS: ICD-10-CM

## 2021-12-21 DIAGNOSIS — M19.012 PRIMARY OSTEOARTHRITIS OF BOTH SHOULDERS: ICD-10-CM
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
## 2021-12-21 DIAGNOSIS — M50.30 DDD (DEGENERATIVE DISC DISEASE), CERVICAL: ICD-10-CM

## 2021-12-21 PROCEDURE — 99213 OFFICE O/P EST LOW 20 MIN: CPT | Mod: S$PBB,,, | Performed by: NURSE PRACTITIONER

## 2021-12-21 PROCEDURE — 99999 PR PBB SHADOW E&M-EST. PATIENT-LVL IV: CPT | Mod: PBBFAC,,, | Performed by: NURSE PRACTITIONER

## 2021-12-21 PROCEDURE — 99214 OFFICE O/P EST MOD 30 MIN: CPT | Mod: PBBFAC,PN | Performed by: NURSE PRACTITIONER

## 2021-12-21 PROCEDURE — 99999 PR PBB SHADOW E&M-EST. PATIENT-LVL IV: ICD-10-PCS | Mod: PBBFAC,,, | Performed by: NURSE PRACTITIONER

## 2021-12-21 PROCEDURE — 99213 PR OFFICE/OUTPT VISIT, EST, LEVL III, 20-29 MIN: ICD-10-PCS | Mod: S$PBB,,, | Performed by: NURSE PRACTITIONER

## 2021-12-22 ENCOUNTER — TELEPHONE (OUTPATIENT)
Dept: INTERNAL MEDICINE | Facility: CLINIC | Age: 66
End: 2021-12-22
Payer: MEDICARE
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
## 2021-12-22 NOTE — TELEPHONE ENCOUNTER
----- Message from Maria Guadalupe Brown sent at 12/21/2021  2:22 PM CST -----  BRANDON IS REQUESTING TO GET COPY OF HIS OFFICE VISITS FROM 09/28/2021 UNTIL TODAY. PLEASE CALL BACK [phone]

## 2021-12-29 ENCOUNTER — OFFICE VISIT (OUTPATIENT)
Dept: INTERNAL MEDICINE | Facility: CLINIC | Age: 66
End: 2021-12-29
Payer: MEDICARE

## 2021-12-29 VITALS
HEART RATE: 86 BPM | SYSTOLIC BLOOD PRESSURE: 157 MMHG | OXYGEN SATURATION: 98 % | TEMPERATURE: 99 F | HEIGHT: 73 IN | BODY MASS INDEX: 24.98 KG/M2 | DIASTOLIC BLOOD PRESSURE: 72 MMHG | WEIGHT: 188.5 LBS

## 2021-12-29 DIAGNOSIS — K59.09 CHRONIC CONSTIPATION: ICD-10-CM

## 2021-12-29 DIAGNOSIS — E11.22 CKD STAGE 3 DUE TO TYPE 2 DIABETES MELLITUS: ICD-10-CM

## 2021-12-29 DIAGNOSIS — E78.5 HYPERLIPIDEMIA ASSOCIATED WITH TYPE 2 DIABETES MELLITUS: ICD-10-CM

## 2021-12-29 DIAGNOSIS — E11.69 HYPERLIPIDEMIA ASSOCIATED WITH TYPE 2 DIABETES MELLITUS: ICD-10-CM

## 2021-12-29 DIAGNOSIS — N18.30 HYPERTENSION ASSOCIATED WITH STAGE 3 CHRONIC KIDNEY DISEASE DUE TO TYPE 2 DIABETES MELLITUS: ICD-10-CM

## 2021-12-29 DIAGNOSIS — N18.30 CKD STAGE 3 DUE TO TYPE 2 DIABETES MELLITUS: ICD-10-CM

## 2021-12-29 DIAGNOSIS — N18.30 TYPE 2 DIABETES MELLITUS WITH STAGE 3 CHRONIC KIDNEY DISEASE, WITHOUT LONG-TERM CURRENT USE OF INSULIN, UNSPECIFIED WHETHER STAGE 3A OR 3B CKD: ICD-10-CM

## 2021-12-29 DIAGNOSIS — F31.9 BIPOLAR 1 DISORDER: ICD-10-CM

## 2021-12-29 DIAGNOSIS — E11.22 HYPERTENSION ASSOCIATED WITH STAGE 3 CHRONIC KIDNEY DISEASE DUE TO TYPE 2 DIABETES MELLITUS: ICD-10-CM

## 2021-12-29 DIAGNOSIS — E11.8 DM (DIABETES MELLITUS) WITH COMPLICATIONS: Primary | ICD-10-CM

## 2021-12-29 DIAGNOSIS — I12.9 HYPERTENSION ASSOCIATED WITH STAGE 3 CHRONIC KIDNEY DISEASE DUE TO TYPE 2 DIABETES MELLITUS: ICD-10-CM

## 2021-12-29 DIAGNOSIS — E11.22 TYPE 2 DIABETES MELLITUS WITH STAGE 3 CHRONIC KIDNEY DISEASE, WITHOUT LONG-TERM CURRENT USE OF INSULIN, UNSPECIFIED WHETHER STAGE 3A OR 3B CKD: ICD-10-CM

## 2021-12-29 PROCEDURE — 99213 OFFICE O/P EST LOW 20 MIN: CPT | Mod: S$PBB,,, | Performed by: INTERNAL MEDICINE

## 2021-12-29 PROCEDURE — 99213 PR OFFICE/OUTPT VISIT, EST, LEVL III, 20-29 MIN: ICD-10-PCS | Mod: S$PBB,,, | Performed by: INTERNAL MEDICINE

## 2021-12-29 PROCEDURE — 99215 OFFICE O/P EST HI 40 MIN: CPT | Mod: PBBFAC,PO | Performed by: INTERNAL MEDICINE

## 2021-12-29 PROCEDURE — 99999 PR PBB SHADOW E&M-EST. PATIENT-LVL V: ICD-10-PCS | Mod: PBBFAC,,, | Performed by: INTERNAL MEDICINE

## 2021-12-29 PROCEDURE — 99999 PR PBB SHADOW E&M-EST. PATIENT-LVL V: CPT | Mod: PBBFAC,,, | Performed by: INTERNAL MEDICINE

## 2021-12-31 ENCOUNTER — EXTERNAL CHRONIC CARE MANAGEMENT (OUTPATIENT)
Dept: PRIMARY CARE CLINIC | Facility: CLINIC | Age: 66
End: 2021-12-31
Payer: MEDICARE

## 2021-12-31 PROCEDURE — 99490 PR CHRONIC CARE MGMT, 1ST 20 MIN: ICD-10-PCS | Mod: S$PBB,,, | Performed by: INTERNAL MEDICINE

## 2021-12-31 PROCEDURE — 99490 CHRNC CARE MGMT STAFF 1ST 20: CPT | Mod: PBBFAC,PO | Performed by: INTERNAL MEDICINE

## 2021-12-31 PROCEDURE — 99490 CHRNC CARE MGMT STAFF 1ST 20: CPT | Mod: S$PBB,,, | Performed by: INTERNAL MEDICINE

## 2022-01-05 DIAGNOSIS — M54.2 NECK PAIN ON LEFT SIDE: ICD-10-CM

## 2022-01-05 DIAGNOSIS — M79.602 LEFT ARM PAIN: ICD-10-CM

## 2022-01-05 RX ORDER — TIZANIDINE 2 MG/1
TABLET ORAL
Qty: 60 TABLET | Refills: 2 | Status: SHIPPED | OUTPATIENT
Start: 2022-01-05 | End: 2022-03-07

## 2022-01-05 NOTE — TELEPHONE ENCOUNTER
No new care gaps identified.  Powered by [employer]. Reference number: [id number].   1/05/2022 8:24:03 AM CST

## 2022-01-10 ENCOUNTER — PATIENT OUTREACH (OUTPATIENT)
Dept: ADMINISTRATIVE | Facility: OTHER | Age: 67
End: 2022-01-10
Payer: MEDICARE

## 2022-01-11 NOTE — PROGRESS NOTES
Health Maintenance Due   Topic Date Due    Hepatitis C Screening  Never done    TETANUS VACCINE  Never done    Sign Pain Contract  Never done    Complete Opioid Risk Tool  Never done    Urine Drug Screen  Never done    Naloxone Prescription  Never done    Shingles Vaccine (1 of 2) Never done    Diabetes Urine Screening  06/21/2020    Foot Exam  08/26/2021    Hemoglobin A1c  01/16/2022     Updates were requested from care everywhere.  Chart was reviewed for overdue [facility]active [facility] Encounters (JULIANA) topics (CRS, Breast Cancer Screening, Eye exam)  Health Maintenance has been updated.  LINKS immunization registry triggered.  Immunizations were reconciled.

## 2022-01-12 ENCOUNTER — OFFICE VISIT (OUTPATIENT)
Dept: PAIN MEDICINE | Facility: CLINIC | Age: 67
End: 2022-01-12
Payer: MEDICARE

## 2022-01-12 VITALS — WEIGHT: 181.13 LBS | HEIGHT: 73 IN | RESPIRATION RATE: 18 BRPM | BODY MASS INDEX: 24.01 KG/M2

## 2022-01-12 DIAGNOSIS — M54.2 CERVICALGIA: ICD-10-CM

## 2022-01-12 DIAGNOSIS — M53.82 WEAKNESS OF NECK: ICD-10-CM

## 2022-01-12 PROCEDURE — 99999 PR PBB SHADOW E&M-EST. PATIENT-LVL V: CPT | Mod: PBBFAC,,, | Performed by: PHYSICAL MEDICINE & REHABILITATION

## 2022-01-12 PROCEDURE — 99204 OFFICE O/P NEW MOD 45 MIN: CPT | Mod: S$PBB,,, | Performed by: PHYSICAL MEDICINE & REHABILITATION

## 2022-01-12 PROCEDURE — 99204 PR OFFICE/OUTPT VISIT, NEW, LEVL IV, 45-59 MIN: ICD-10-PCS | Mod: S$PBB,,, | Performed by: PHYSICAL MEDICINE & REHABILITATION

## 2022-01-12 PROCEDURE — 99999 PR PBB SHADOW E&M-EST. PATIENT-LVL V: ICD-10-PCS | Mod: PBBFAC,,, | Performed by: PHYSICAL MEDICINE & REHABILITATION

## 2022-01-12 PROCEDURE — 99215 OFFICE O/P EST HI 40 MIN: CPT | Mod: PBBFAC | Performed by: PHYSICAL MEDICINE & REHABILITATION
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
## 2022-01-19 ENCOUNTER — LAB VISIT (OUTPATIENT)
Dept: LAB | Facility: HOSPITAL | Age: 67
End: 2022-01-19
Attending: INTERNAL MEDICINE
Payer: MEDICARE

## 2022-01-19 DIAGNOSIS — E11.8 DM (DIABETES MELLITUS) WITH COMPLICATIONS: ICD-10-CM

## 2022-01-19 LAB
ALBUMIN SERPL BCP-MCNC: 3.8 G/DL (ref 3.5–5.2)
ALP SERPL-CCNC: 85 U/L (ref 55–135)
ALT SERPL W/O P-5'-P-CCNC: 29 U/L (ref 10–44)
ANION GAP SERPL CALC-SCNC: 7 MMOL/L (ref 8–16)
AST SERPL-CCNC: 38 U/L (ref 10–40)
BASOPHILS # BLD AUTO: 0.07 K/UL (ref 0–0.2)
BASOPHILS NFR BLD: 1 % (ref 0–1.9)
BILIRUB SERPL-MCNC: 0.4 MG/DL (ref 0.1–1)
BUN SERPL-MCNC: 32 MG/DL (ref 8–23)
CALCIUM SERPL-MCNC: 9.7 MG/DL (ref 8.7–10.5)
CHLORIDE SERPL-SCNC: 108 MMOL/L (ref 95–110)
CHOLEST SERPL-MCNC: 128 MG/DL (ref 120–199)
CHOLEST/HDLC SERPL: 2.6 {RATIO} (ref 2–5)
CO2 SERPL-SCNC: 25 MMOL/L (ref 23–29)
CREAT SERPL-MCNC: 2.1 MG/DL (ref 0.5–1.4)
DIFFERENTIAL METHOD: ABNORMAL
EOSINOPHIL # BLD AUTO: 0.4 K/UL (ref 0–0.5)
EOSINOPHIL NFR BLD: 5.5 % (ref 0–8)
ERYTHROCYTE [DISTWIDTH] IN BLOOD BY AUTOMATED COUNT: 17 % (ref 11.5–14.5)
EST. GFR  (AFRICAN AMERICAN): 36.3 ML/MIN/1.73 M^2
EST. GFR  (NON AFRICAN AMERICAN): 31.4 ML/MIN/1.73 M^2
ESTIMATED AVG GLUCOSE: 126 MG/DL (ref 68–131)
GLUCOSE SERPL-MCNC: 70 MG/DL (ref 70–110)
HBA1C MFR BLD: 6 % (ref 4–5.6)
HCT VFR BLD AUTO: 31.1 % (ref 40–54)
HDLC SERPL-MCNC: 50 MG/DL (ref 40–75)
HDLC SERPL: 39.1 % (ref 20–50)
HGB BLD-MCNC: 10.1 G/DL (ref 14–18)
IMM GRANULOCYTES # BLD AUTO: 0.03 K/UL (ref 0–0.04)
IMM GRANULOCYTES NFR BLD AUTO: 0.4 % (ref 0–0.5)
LDLC SERPL CALC-MCNC: 61 MG/DL (ref 63–159)
LYMPHOCYTES # BLD AUTO: 2.8 K/UL (ref 1–4.8)
LYMPHOCYTES NFR BLD: 38.5 % (ref 18–48)
MCH RBC QN AUTO: 28.8 PG (ref 27–31)
MCHC RBC AUTO-ENTMCNC: 32.5 G/DL (ref 32–36)
MCV RBC AUTO: 89 FL (ref 82–98)
MONOCYTES # BLD AUTO: 0.8 K/UL (ref 0.3–1)
MONOCYTES NFR BLD: 10.6 % (ref 4–15)
NEUTROPHILS # BLD AUTO: 3.2 K/UL (ref 1.8–7.7)
NEUTROPHILS NFR BLD: 44 % (ref 38–73)
NONHDLC SERPL-MCNC: 78 MG/DL
NRBC BLD-RTO: 0 /100 WBC
PLATELET # BLD AUTO: 216 K/UL (ref 150–450)
PMV BLD AUTO: 11.7 FL (ref 9.2–12.9)
POTASSIUM SERPL-SCNC: 4.9 MMOL/L (ref 3.5–5.1)
PROT SERPL-MCNC: 7.1 G/DL (ref 6–8.4)
RBC # BLD AUTO: 3.51 M/UL (ref 4.6–6.2)
SODIUM SERPL-SCNC: 140 MMOL/L (ref 136–145)
TRIGL SERPL-MCNC: 85 MG/DL (ref 30–150)
TSH SERPL DL<=0.005 MIU/L-ACNC: 1.07 UIU/ML (ref 0.4–4)
WBC # BLD AUTO: 7.27 K/UL (ref 3.9–12.7)

## 2022-01-19 PROCEDURE — 80053 COMPREHEN METABOLIC PANEL: CPT | Performed by: INTERNAL MEDICINE

## 2022-01-19 PROCEDURE — 80061 LIPID PANEL: CPT | Performed by: INTERNAL MEDICINE

## 2022-01-19 PROCEDURE — 85025 COMPLETE CBC W/AUTO DIFF WBC: CPT | Performed by: INTERNAL MEDICINE

## 2022-01-19 PROCEDURE — 36415 COLL VENOUS BLD VENIPUNCTURE: CPT | Mod: PO | Performed by: INTERNAL MEDICINE

## 2022-01-19 PROCEDURE — 83036 HEMOGLOBIN GLYCOSYLATED A1C: CPT | Performed by: INTERNAL MEDICINE

## 2022-01-19 PROCEDURE — 84443 ASSAY THYROID STIM HORMONE: CPT | Performed by: INTERNAL MEDICINE

## 2022-01-21 ENCOUNTER — HOSPITAL ENCOUNTER (OUTPATIENT)
Dept: RADIOLOGY | Facility: HOSPITAL | Age: 67
Discharge: HOME | End: 2022-01-21
Attending: PHYSICAL MEDICINE & REHABILITATION
Payer: MEDICARE

## 2022-01-21 DIAGNOSIS — M53.82 WEAKNESS OF NECK: ICD-10-CM

## 2022-01-21 DIAGNOSIS — M54.2 CERVICALGIA: ICD-10-CM

## 2022-01-21 PROCEDURE — 72125 CT NECK SPINE W/O DYE: CPT | Mod: TC

## 2022-01-26 ENCOUNTER — TELEPHONE (OUTPATIENT)
Dept: PAIN MEDICINE | Facility: CLINIC | Age: 67
End: 2022-01-26
Payer: MEDICARE

## 2022-01-26 NOTE — TELEPHONE ENCOUNTER
----- Message from Romulo Flores MD sent at 1/26/2022  3:49 PM CST -----  Please call patient and inform them of the CT scan results.  At the C4-5 and C5-6 levels, there is evidence of degenerative change along with arthritic change.  It may be of benefit to consider nerve blocks to help address this arthritic pain.

## 2022-01-26 NOTE — TELEPHONE ENCOUNTER
Informed pt  of results. Patient stated he would wait on doing injection and call back  To schedule If needed .  Pt understood . All questions answered .

## 2022-01-31 ENCOUNTER — EXTERNAL CHRONIC CARE MANAGEMENT (OUTPATIENT)
Dept: PRIMARY CARE CLINIC | Facility: CLINIC | Age: 67
End: 2022-01-31
Payer: MEDICARE

## 2022-01-31 PROCEDURE — 99490 PR CHRONIC CARE MGMT, 1ST 20 MIN: ICD-10-PCS | Mod: S$PBB,,, | Performed by: INTERNAL MEDICINE

## 2022-01-31 PROCEDURE — 99490 CHRNC CARE MGMT STAFF 1ST 20: CPT | Mod: S$PBB,,, | Performed by: INTERNAL MEDICINE

## 2022-01-31 PROCEDURE — 99490 CHRNC CARE MGMT STAFF 1ST 20: CPT | Mod: PBBFAC,PO | Performed by: INTERNAL MEDICINE

## 2022-02-06 RX ORDER — INSULIN GLARGINE 100 [IU]/ML
INJECTION, SOLUTION SUBCUTANEOUS
Qty: 45 ML | Refills: 3 | Status: SHIPPED | OUTPATIENT
Start: 2022-02-06

## 2022-02-06 NOTE — TELEPHONE ENCOUNTER
No new care gaps identified.  Powered by [employer]. Reference number: [id number].   2/06/2022 3:38:14 AM CST

## 2022-02-07 NOTE — TELEPHONE ENCOUNTER
Patient called wanting to know if he could take his insulin at night with out eating , educated to eat it could cause his insulin level to go to low , patient stated okay

## 2022-03-30 DIAGNOSIS — E11.8 DM (DIABETES MELLITUS) WITH COMPLICATIONS: ICD-10-CM

## 2022-03-30 NOTE — TELEPHONE ENCOUNTER
No new care gaps identified.  Powered by [employer]. Reference number: [id number].   3/30/2022 11:32:31 AM CDT

## 2022-03-31 RX ORDER — BLOOD-GLUCOSE METER
KIT MISCELLANEOUS
Qty: 200 STRIP | Refills: 3 | Status: SHIPPED | OUTPATIENT
Start: 2022-03-31

## 2022-03-31 NOTE — TELEPHONE ENCOUNTER
Refill Authorization Note   Hossein Will  is requesting a refill authorization.  Brief Assessment and Rationale for Refill:  Approve     Medication Therapy Plan:       Medication Reconciliation Completed: No   Comments:   --->Care Gap information included below if applicable.       Requested Prescriptions   Pending Prescriptions Disp Refills    FREESTYLE LITE STRIPS Strp [Pharmacy Med Name: FREESTYLE LITE BLOOD GLUCOSE STRIPS] 200 strip 3     Sig: USE AS DIRECTED TWICE DAILY       Endocrinology: Diabetes - Supplies Failed - 3/30/2022 11:32 AM        Failed - Matches previous order       Previous Authorizing Provider: Seun Chamberlain MD (FREESTYLE LITE STRIPS Strp)  Previous Pharmacy: [id number] [place] [address] [address] AT [facility]            Passed - Patient is at least 18 years old        Passed - Valid encounter within last 15 months     Recent Visits  Date Type Provider Dept   12/29/21 Office Visit Seun Chamberlain MD [facility]   12/09/21 Office Visit Seun Chamberlain MD [facility]   07/16/21 Office Visit Seun Chamberlain MD [facility]   08/26/20 Office Visit Seun Chamberlain MD [facility]   Showing recent visits within past 720 days and meeting all other requirements  Future Appointments  No visits were found meeting these conditions.  Showing future appointments within next 150 days and meeting all other requirements      Future Appointments              In 1 week Seun Chamberlain MD [facility] [place]                Passed - No ED/Hospital visits since last PCP visit     Last PCP Visit: 12/29/2021 Last Admission: 10/7/2021 Last ED Visit:           Passed - HBA1C within 1080 days     Lab Results   Component Value Date    LABA1C 10.4 07/15/2019    HGBA1C 6.0 (H) 01/19/2022    HGBA1C 7.5 (H) 07/16/2021    HGBA1C 5.6 07/16/2020                  Appointments  past 12m or future 3m with PCP    Date  Provider   Last Visit   12/29/2021 Seun Chamberlain MD   Next Visit   4/11/2022 Seun Chamberlain MD   ED visits in past 90 days: 0     Note composed:12:11 PM 03/31/2022

## 2022-05-02 ENCOUNTER — PATIENT OUTREACH (OUTPATIENT)
Dept: ADMINISTRATIVE | Facility: HOSPITAL | Age: 67
End: 2022-05-02
Payer: MEDICARE

## 2022-05-02 NOTE — PROGRESS NOTES
Called patient to confirm hospital follow up scheduled on 5/03/20222.   Patient is not confirmed. LM to confirm appt.

## 2022-05-03 ENCOUNTER — TELEPHONE (OUTPATIENT)
Dept: INTERNAL MEDICINE | Facility: CLINIC | Age: 67
End: 2022-05-03
Payer: MEDICARE

## 2022-05-03 NOTE — TELEPHONE ENCOUNTER
----- Message from Sarkis Giron sent at 5/3/2022  1:32 PM CDT -----  Contact: Hossein  Patient is calling to speak with the nurse regarding having a referral to Dr Silver with [facility] in [place]. Thee office number is [phone] for wound care. Reports being seen recreantly the hospital and is already scheduled for a follow up with Dr Chamberlain as well. Patient is also requesting a call back at [phone] to notify.  Thanks,  RP

## 2022-05-03 NOTE — TELEPHONE ENCOUNTER
Returned phone call advised referral for wound clinic can be placed on Friday after seeing Dr Chamberlain. Voiced understanding

## 2022-05-04 ENCOUNTER — PATIENT OUTREACH (OUTPATIENT)
Dept: ADMINISTRATIVE | Facility: HOSPITAL | Age: 67
End: 2022-05-04
Payer: MEDICARE

## 2022-05-04 NOTE — PROGRESS NOTES
Called patient to confirm hospital follow up scheduled on 5/06/2022.   Patient is confirmed by Dr Chamberlain staff.

## 2022-05-05 ENCOUNTER — TELEPHONE (OUTPATIENT)
Dept: INTERNAL MEDICINE | Facility: CLINIC | Age: 67
End: 2022-05-05
Payer: MEDICARE

## 2022-05-05 NOTE — TELEPHONE ENCOUNTER
----- Message from John Henderson sent at 5/4/2022  1:40 PM CDT -----  Contact: self  Type:  Patient Returning Call    Who Called: Hossein Will    Who Left Message for Patient: nurse   Does the patient know what this is regarding?: not sure   Would the patient rather a call back or a response via [facility]?  Call back   Best Call Back Number: [phone]   Additional Information:

## 2022-05-16 ENCOUNTER — TELEPHONE (OUTPATIENT)
Dept: INTERNAL MEDICINE | Facility: CLINIC | Age: 67
End: 2022-05-16
Payer: MEDICARE

## 2022-05-16 NOTE — TELEPHONE ENCOUNTER
Monica will have Mr Will call and make an appt to discuss nursing home placement, he dont have transportation at this time and he will make the plans to get here. And call.

## 2022-05-16 NOTE — TELEPHONE ENCOUNTER
----- Message from Isha Panda sent at 5/16/2022  2:38 PM CDT -----  Contact: Monica, Spouse  Monica called regarding getting an appointment for Hossein for tomorrow at 10 am, please give her a call back at [phone].   There were no appointments available in system.          Thanks  Kb

## 2022-05-16 NOTE — TELEPHONE ENCOUNTER
----- Message from Eladio Bar sent at 5/16/2022 11:16 AM CDT -----  Contact: Monica Anderson is requesting a call to discuss placing pt in nursing home. Please call her back at [phone].            Thanks  DD

## 2022-05-25 DIAGNOSIS — E11.9 TYPE 2 DIABETES MELLITUS WITHOUT COMPLICATION: ICD-10-CM

## 2022-05-31 ENCOUNTER — EXTERNAL CHRONIC CARE MANAGEMENT (OUTPATIENT)
Dept: PRIMARY CARE CLINIC | Facility: CLINIC | Age: 67
End: 2022-05-31
Payer: MEDICARE

## 2022-05-31 PROCEDURE — 99490 CHRNC CARE MGMT STAFF 1ST 20: CPT | Mod: PBBFAC,PO | Performed by: INTERNAL MEDICINE

## 2022-05-31 PROCEDURE — 99490 CHRNC CARE MGMT STAFF 1ST 20: CPT | Mod: S$PBB,,, | Performed by: INTERNAL MEDICINE

## 2022-05-31 PROCEDURE — 99490 PR CHRONIC CARE MGMT, 1ST 20 MIN: ICD-10-PCS | Mod: S$PBB,,, | Performed by: INTERNAL MEDICINE

## 2022-06-03 ENCOUNTER — TELEPHONE (OUTPATIENT)
Dept: INTERNAL MEDICINE | Facility: CLINIC | Age: 67
End: 2022-06-03
Payer: MEDICARE

## 2022-06-03 NOTE — TELEPHONE ENCOUNTER
----- Message from Roxann Batista sent at 6/3/2022  2:37 PM CDT -----  Contact: Monica/ Wife  Monica is needing a call back regarding getting the patient in on Monday 6/6, for a hospital follow up for the patients foot. Please call her at [phone]

## 2022-06-08 ENCOUNTER — OFFICE VISIT (OUTPATIENT)
Dept: INTERNAL MEDICINE | Facility: CLINIC | Age: 67
End: 2022-06-08
Payer: MEDICARE

## 2022-06-08 VITALS
SYSTOLIC BLOOD PRESSURE: 108 MMHG | HEART RATE: 88 BPM | OXYGEN SATURATION: 98 % | BODY MASS INDEX: 20.68 KG/M2 | HEIGHT: 73 IN | TEMPERATURE: 99 F | WEIGHT: 156.06 LBS | DIASTOLIC BLOOD PRESSURE: 60 MMHG

## 2022-06-08 DIAGNOSIS — N18.30 HYPERTENSION ASSOCIATED WITH STAGE 3 CHRONIC KIDNEY DISEASE DUE TO TYPE 2 DIABETES MELLITUS: ICD-10-CM

## 2022-06-08 DIAGNOSIS — E11.22 HYPERTENSION ASSOCIATED WITH STAGE 3 CHRONIC KIDNEY DISEASE DUE TO TYPE 2 DIABETES MELLITUS: ICD-10-CM

## 2022-06-08 DIAGNOSIS — I12.9 HYPERTENSION ASSOCIATED WITH STAGE 3 CHRONIC KIDNEY DISEASE DUE TO TYPE 2 DIABETES MELLITUS: ICD-10-CM

## 2022-06-08 DIAGNOSIS — E11.42 CONTROLLED TYPE 2 DIABETES MELLITUS WITH DIABETIC POLYNEUROPATHY, WITHOUT LONG-TERM CURRENT USE OF INSULIN: ICD-10-CM

## 2022-06-08 DIAGNOSIS — E11.8 DM (DIABETES MELLITUS) WITH COMPLICATIONS: Primary | ICD-10-CM

## 2022-06-08 PROCEDURE — 99213 PR OFFICE/OUTPT VISIT, EST, LEVL III, 20-29 MIN: ICD-10-PCS | Mod: S$PBB,,, | Performed by: INTERNAL MEDICINE

## 2022-06-08 PROCEDURE — 99999 PR PBB SHADOW E&M-EST. PATIENT-LVL III: ICD-10-PCS | Mod: PBBFAC,,, | Performed by: INTERNAL MEDICINE

## 2022-06-08 PROCEDURE — 99213 OFFICE O/P EST LOW 20 MIN: CPT | Mod: S$PBB,,, | Performed by: INTERNAL MEDICINE

## 2022-06-08 PROCEDURE — 99999 PR PBB SHADOW E&M-EST. PATIENT-LVL III: CPT | Mod: PBBFAC,,, | Performed by: INTERNAL MEDICINE

## 2022-06-08 PROCEDURE — 99213 OFFICE O/P EST LOW 20 MIN: CPT | Mod: PBBFAC,PO | Performed by: INTERNAL MEDICINE

## 2022-06-08 NOTE — PROGRESS NOTES
"HPI:  Due to recurrent problems with diabetic foot ulcers.  He has been seen in the past by Dr. Silver at the wound care clinic at [facility].  He has been about a year since he has been seen.    Current meds have been verified and updated per the EMR  Exam:/60   Pulse 88   Temp 98.7 °F (37.1 °C) (Temporal)   Ht 6' 1" (1.854 m)   Wt 70.8 kg (156 lb 1.4 oz)   SpO2 98%   BMI 20.59 kg/m²   He has on the right great toe of dry foot ulcer on the plantar surface.  No signs of infection.  Over the left lateral ankle he has a dry ulcer as well.  No signs of infection    Lab Results   Component Value Date    WBC 7.27 01/19/2022    HGB 10.1 (L) 01/19/2022    HCT 31.1 (L) 01/19/2022     01/19/2022    CHOL 128 01/19/2022    TRIG 85 01/19/2022    HDL 50 01/19/2022    ALT 29 01/19/2022    AST 38 01/19/2022     01/19/2022    K 4.9 01/19/2022     01/19/2022    CREATININE 2.1 (H) 01/19/2022    BUN 32 (H) 01/19/2022    CO2 25 01/19/2022    TSH 1.071 01/19/2022    PSA 0.93 07/16/2021    HGBA1C 6.0 (H) 01/19/2022       Impression:  Diabetic foot ulcers  Patient Active Problem List   Diagnosis    DM (diabetes mellitus) with complications    Type II diabetes mellitus with renal manifestations    Hyperlipidemia associated with type 2 diabetes mellitus    Bipolar 1 disorder    CKD stage 3 due to type 2 diabetes mellitus    Hyperuricemia    Knee effusion, left    Elevated PSA    Controlled diabetes mellitus with diabetic polyneuropathy    Pacemaker    Chronic pain of left knee    Complete tear of medial collateral ligament of right knee    Cervical spondylosis with myelopathy and radiculopathy    Lumbosacral spondylosis with radiculopathy    History of colon polyps    Primary osteoarthritis of both knees    Ulcer of left foot    Hypertension associated with stage 3 chronic kidney disease due to type 2 diabetes mellitus    Chronic constipation       Plan:    patient was " referred back to see the wound care clinic.      This note is generated with speech recognition software and is subject to transcription error and sound alike phrases that may be missed by proofreading.

## 2022-07-07 ENCOUNTER — TELEPHONE (OUTPATIENT)
Dept: INTERNAL MEDICINE | Facility: CLINIC | Age: 67
End: 2022-07-07
Payer: MEDICARE

## 2022-07-07 NOTE — TELEPHONE ENCOUNTER
I called the pt and he was requesting his chart notes so I printed from 09/2021 to 12/2021 and mailed it . //kah
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
## 2022-08-03 ENCOUNTER — TELEPHONE (OUTPATIENT)
Dept: INTERNAL MEDICINE | Facility: CLINIC | Age: 67
End: 2022-08-03
Payer: MEDICARE

## 2022-08-03 DIAGNOSIS — E11.9 TYPE 2 DIABETES MELLITUS WITHOUT COMPLICATION: ICD-10-CM

## 2022-08-03 NOTE — TELEPHONE ENCOUNTER
Patient is going to stay at [facility]  And he will be seeing the doctor there

## 2022-08-03 NOTE — TELEPHONE ENCOUNTER
----- Message from Pedro Lacey sent at 8/3/2022  3:38 PM CDT -----  Patient is needing to consult with nurse in regards to switching over prescriptions. If you could give him a call  back at [phone].   Thank you, stacie

## 2023-01-25 DIAGNOSIS — E11.9 TYPE 2 DIABETES MELLITUS WITHOUT COMPLICATION: ICD-10-CM

## 2023-03-11 NOTE — TELEPHONE ENCOUNTER
Pt came in to the clinic and requested that the CT Notes be faxed to [facility] on [address]. The patient's wife wanted to make it very clear where she wanted notes to be sent. -AS    Cimzia Counseling:  I discussed with the patient the risks of Cimzia including but not limited to immunosuppression, allergic reactions and infections.  The patient understands that monitoring is required including a PPD at baseline and must alert us or the primary physician if symptoms of infection or other concerning signs are noted.

## 2023-05-01 NOTE — TELEPHONE ENCOUNTER
Abiodun- the owner of [employer] [facility] on 4/12 at 12:41pm. The patient number was verified between  and Mr. Rascon for the patient.     They are not able to get in touch with him and Mr. Rascon did not need a call back form this message.        Name band;

## 2023-06-02 ENCOUNTER — PATIENT OUTREACH (OUTPATIENT)
Dept: ADMINISTRATIVE | Facility: HOSPITAL | Age: 68
End: 2023-06-02
Payer: MEDICARE

## 2023-06-02 NOTE — PROGRESS NOTES
Working DM Eye Report:     Patient overdue for DM eye exam.    No answer, left message for a return call.

## 2023-07-27 ENCOUNTER — TELEPHONE (OUTPATIENT)
Dept: INTERNAL MEDICINE | Facility: CLINIC | Age: 68
End: 2023-07-27
Payer: MEDICARE
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
